# Patient Record
Sex: MALE | Race: WHITE | NOT HISPANIC OR LATINO | Employment: OTHER | URBAN - METROPOLITAN AREA
[De-identification: names, ages, dates, MRNs, and addresses within clinical notes are randomized per-mention and may not be internally consistent; named-entity substitution may affect disease eponyms.]

---

## 2017-02-07 ENCOUNTER — CLINICAL SUPPORT (OUTPATIENT)
Dept: FAMILY MEDICINE | Facility: CLINIC | Age: 72
End: 2017-02-07
Payer: MEDICARE

## 2017-02-07 DIAGNOSIS — I10 ESSENTIAL HYPERTENSION: ICD-10-CM

## 2017-02-07 DIAGNOSIS — E78.5 HYPERLIPIDEMIA, UNSPECIFIED HYPERLIPIDEMIA TYPE: ICD-10-CM

## 2017-02-07 LAB
ALBUMIN SERPL BCP-MCNC: 3.6 G/DL
ALP SERPL-CCNC: 48 U/L
ALT SERPL W/O P-5'-P-CCNC: 38 U/L
ANION GAP SERPL CALC-SCNC: 6 MMOL/L
AST SERPL-CCNC: 35 U/L
BILIRUB SERPL-MCNC: 0.9 MG/DL
BUN SERPL-MCNC: 18 MG/DL
CALCIUM SERPL-MCNC: 9.3 MG/DL
CHLORIDE SERPL-SCNC: 109 MMOL/L
CHOLEST/HDLC SERPL: 2.8 {RATIO}
CO2 SERPL-SCNC: 26 MMOL/L
CREAT SERPL-MCNC: 0.8 MG/DL
EST. GFR  (AFRICAN AMERICAN): >60 ML/MIN/1.73 M^2
EST. GFR  (NON AFRICAN AMERICAN): >60 ML/MIN/1.73 M^2
GLUCOSE SERPL-MCNC: 79 MG/DL
HDL/CHOLESTEROL RATIO: 35.6 %
HDLC SERPL-MCNC: 146 MG/DL
HDLC SERPL-MCNC: 52 MG/DL
LDLC SERPL CALC-MCNC: 81.8 MG/DL
NONHDLC SERPL-MCNC: 94 MG/DL
POTASSIUM SERPL-SCNC: 4.9 MMOL/L
PROT SERPL-MCNC: 6.4 G/DL
SODIUM SERPL-SCNC: 141 MMOL/L
TRIGL SERPL-MCNC: 61 MG/DL

## 2017-02-07 PROCEDURE — 80053 COMPREHEN METABOLIC PANEL: CPT

## 2017-02-07 PROCEDURE — 80061 LIPID PANEL: CPT

## 2017-02-13 ENCOUNTER — DOCUMENTATION ONLY (OUTPATIENT)
Dept: FAMILY MEDICINE | Facility: CLINIC | Age: 72
End: 2017-02-13

## 2017-02-13 NOTE — PROGRESS NOTES
Health Maintenance Due   Topic Date Due    Colonoscopy  03/30/1997    Zoster Vaccine  03/30/2007    Pneumococcal (65+) (1 of 2 - PCV13) 03/30/2012

## 2017-02-14 ENCOUNTER — OFFICE VISIT (OUTPATIENT)
Dept: FAMILY MEDICINE | Facility: CLINIC | Age: 72
End: 2017-02-14
Payer: MEDICARE

## 2017-02-14 VITALS
RESPIRATION RATE: 16 BRPM | HEIGHT: 68 IN | OXYGEN SATURATION: 96 % | DIASTOLIC BLOOD PRESSURE: 89 MMHG | WEIGHT: 184.06 LBS | BODY MASS INDEX: 27.9 KG/M2 | HEART RATE: 81 BPM | TEMPERATURE: 98 F | SYSTOLIC BLOOD PRESSURE: 137 MMHG

## 2017-02-14 DIAGNOSIS — I10 ESSENTIAL HYPERTENSION: Primary | ICD-10-CM

## 2017-02-14 DIAGNOSIS — E78.5 HYPERLIPIDEMIA, UNSPECIFIED HYPERLIPIDEMIA TYPE: ICD-10-CM

## 2017-02-14 DIAGNOSIS — Z23 NEED FOR VACCINATION WITH 13-POLYVALENT PNEUMOCOCCAL CONJUGATE VACCINE: ICD-10-CM

## 2017-02-14 PROCEDURE — G0009 ADMIN PNEUMOCOCCAL VACCINE: HCPCS | Mod: S$GLB,,, | Performed by: INTERNAL MEDICINE

## 2017-02-14 PROCEDURE — 99213 OFFICE O/P EST LOW 20 MIN: CPT | Mod: S$GLB,,, | Performed by: INTERNAL MEDICINE

## 2017-02-14 PROCEDURE — 90670 PCV13 VACCINE IM: CPT | Mod: S$GLB,,, | Performed by: INTERNAL MEDICINE

## 2017-02-14 NOTE — PATIENT INSTRUCTIONS

## 2017-02-14 NOTE — MR AVS SNAPSHOT
[facility]  [address] [address] [address]  Phone: [phone]  Fax: [phone]                  Michael Ann Jr.   2017 8:20 AM   Office Visit    Description:  Male : 1945   Provider:  Daniel Baptiste MD   Department:  [facility]           Reason for Visit     Hyperlipidemia           Diagnoses this Visit        Comments    Essential hypertension    -  Primary     Hyperlipidemia, unspecified hyperlipidemia type         Need for vaccination with 13-polyvalent pneumococcal conjugate vaccine                To Do List           Future Appointments        Provider Department Dept Phone    8/15/2017 8:40 AM Daniel Baptiste MD [facility] [phone]      Goals (5 Years of Data)     None      Follow-Up and Disposition     Return in about 6 months (around 2017).      [facility]     [facility] Nurse [facility] - 24/7 Assistance  Registered nurses in the [facility] On Call Center provide clinical advisement, health education, appointment booking, and other advisory services.  Call for this free service at [phone].             Medications           Message regarding Medications     Verify the changes and/or additions to your medication regime listed below are the same as discussed with your clinician today.  If any of these changes or additions are incorrect, please notify your healthcare provider.             Verify that the below list of medications is an accurate representation of the medications you are currently taking.  If none reported, the list may be blank. If incorrect, please contact your healthcare provider. Carry this list with you in case of emergency.           Current Medications     amlodipine (NORVASC) 2.5 MG tablet Take 1 tablet (2.5 mg total) by mouth once daily.    pravastatin (PRAVACHOL) 40 MG tablet Take 1 tablet (40 mg total) by mouth once daily. Start after a 16    LORATADINE  "(CLARITIN ORAL) Take by mouth once daily.           Clinical Reference Information           Your Vitals Were     BP Pulse Temp Resp Height Weight    137/89 (BP Location: Left arm, Patient Position: Sitting, BP Method: Automatic) 81 97.7 °F (36.5 °C) (Oral) 16 5' 8" (1.727 m) 83.5 kg (184 lb 1.4 oz)    SpO2 BMI             96% 27.99 kg/m2         Blood Pressure          Most Recent Value    BP  137/89      Allergies as of 2/14/2017     No Known Allergies      Immunizations Administered on Date of Encounter - 2/14/2017     Name Date Dose VIS Date Route    Pneumococcal Conjugate - 13 Valent 2/14/2017 0.5 mL 11/5/2015 Intramuscular      Orders Placed During Today's Visit      Normal Orders This Visit    Pneumococcal Conjugate Vaccine (13 Valent) (IM)     Future Labs/Procedures Expected by Expires    Comprehensive metabolic panel  2/14/2017 2/15/2018    Lipid panel  2/14/2017 2/15/2018      [facility] Sign-Up     Activating your [facility] account is as easy as 1-2-3!     1) Visit [email or web address], select Sign Up Now, enter this activation code and your date of birth, then select Next.  Activation code not generated  Current Patient Portal Status: Account disabled      2) Create a username and password to use when you visit [facility] in the future and select a security question in case you lose your password and select Next.    3) Enter your e-mail address and click Sign Up!    Additional Information  If you have questions, please e-mail [email or web address] or call [phone] to talk to our [facility] staff. Remember, [facility] is NOT to be used for urgent needs. For medical emergencies, dial 911.         Instructions    Low-Salt Diet  This diet removes foods that are high in salt. It also limits the amount of salt you use when cooking. It is most often used for people with high blood pressure, edema (fluid retention), and kidney, liver, or heart disease.  Table salt contains the mineral sodium. Your body needs sodium to " work normally. But too much sodium can make your health problems worse. Your healthcare provider is recommending a low-salt (also called low-sodium) diet for you. Your total daily allowance of salt is 1,500 to 2,300 milligrams (mg). It is less than 1 teaspoon of table salt. This means you can have only about 500 to 700 mg of sodium at each meal. People with certain health problems should limit salt intake to the lower end of the recommended range.    When you cook, dont add much salt. If you can cook without using salt, even better. Dont add salt to your food at the table.  When shopping, read food labels. Salt is often called sodium on the label. Choose foods that are salt-free, low salt, or very low salt. Note that foods with reduced salt may not lower your salt intake enough.    Beans, potatoes, and pasta  Ok: Dry beans, split peas, lentils, potatoes, rice, macaroni, pasta, spaghetti without added salt  Avoid: Potato chips, tortilla chips, and similar products  Breads and cereals  Ok: Low-sodium breads, rolls, cereals, and cakes; low-salt crackers, matzo crackers  Avoid: Salted crackers, pretzels, popcorn, Panamanian toast, pancakes, muffins  Dairy  Ok: Milk, chocolate milk, hot chocolate mix, low-salt cheeses, and yogurt  Avoid: Processed cheese and cheese spreads; Roquefort, Camembert, and cottage cheese; buttermilk, instant breakfast drink  Desserts  Ok: Ice cream, frozen yogurt, juice bars, gelatin, cookies and pies, sugar, honey, jelly, hard candy  Avoid: Most pies, cakes and cookies prepared or processed with salt; instant pudding  Drinks  Ok: Tea, coffee, fizzy (carbonated) drinks, juices  Avoid: Flavored coffees, electrolyte replacement drinks, sports drinks  Meats  Ok: All fresh meat, fish, poultry, low-salt tuna, eggs, egg substitute  Avoid: Smoked, pickled, brine-cured, or salted meats and fish. This includes barraza, chipped beef, corned beef, hot dogs, deli meats, ham, kosher meats, salt pork, sausage,  canned tuna, salted codfish, smoked salmon, herring, sardines, or anchovies.  Seasonings and spices  Ok: Most seasonings are okay. Good substitutes for salt include: fresh herb blends, hot sauce, lemon, garlic, barton, vinegar, dry mustard, parsley, cilantro, horseradish, tomato paste, regular margarine, mayonnaise, unsalted butter, cream cheese, vegetable oil, cream, low-salt salad dressing and gravy.  Avoid: Regular ketchup, relishes, pickles, soy sauce, teriyaki sauce, Worcestershire sauce, BBQ sauce, tartar sauce, meat tenderizer, chili sauce, regular gravy, regular salad dressing, salted butter  Soups  Ok: Low-salt soups and broths made with allowed foods  Avoid: Bouillon cubes, soups with smoked or salted meats, regular soup and broth  Vegetables  Ok: Most vegetables are okay; also low-salt tomato and vegetable juices  Avoid: Sauerkraut and other brine-soaked vegetables; pickles and other pickled vegetables; tomato juice, olives  © 1639-3694 [employer]. [address] [place] [address], [place] [address]. All rights reserved. This information is not intended as a substitute for professional medical care. Always follow your healthcare professional's instructions.       Language Assistance Services     ATTENTION: Language assistance services are available, free of charge. Please call [phone].      ATENCIÓN: Si habla español, tiene a rush disposición servicios gratuitos de asistencia lingüística. Llame al [phone].     CHÚ Ý: N?u b?n nói Ti?ng Vi?t, có các d?ch v? h? tr? ngôn ng? mi?n phí dành cho b?n. G?i s? [phone].         [facility] complies with applicable Federal civil rights laws and does not discriminate on the basis of race, color, national origin, age, disability, or sex.

## 2017-02-14 NOTE — PROGRESS NOTES
Subjective:       Patient ID: Michael Ann Jr. is a 71 y.o. male.    Chief Complaint: Hyperlipidemia (labs)    HPI       CHIEF COMPLAINT: Hyperlipidemia. cholesterol screening: no.   HPI:     ONSET:    MODIFIERS/TREATMENTS: . Taking medications: yes. . Non-compliance with following diet: no. .     SYMPTOMS/RELATED:Possible medication side effects include:   Myalgia: no.  .     REVIEW OF SYMPTOMS: past weights:   Wt Readings from Last 1 Encounters:   02/14/17 0821 83.5 kg (184 lb 1.4 oz)                                                     Last lipids: total   Lab Results   Component Value Date    CHOL 146 02/07/2017    CHOL 158 11/08/2016                                                                     HDL   Lab Results   Component Value Date    HDL 52 02/07/2017    HDL 49 11/08/2016                                                                     LDL   Lab Results   Component Value Date    LDLCALC 81.8 02/07/2017    LDLCALC 97.4 11/08/2016                                                                     TRIG   Lab Results   Component Value Date    TRIG 61 02/07/2017    TRIG 58 11/08/2016                                                                           CHIEF COMPLAINT: Hypertension  HPI:     ONSET:      QUALITY/COURSE:   Controlled:  yes     INTENSITY/SEVERITY:  Average blood pressure is  130/70.     MODIFIERS/TREATMENTS:  Taking medications: yes. .High sodium intake: no. alcohol: no      The following symptoms are positive only if BOLDED, otherwise are negative.      SYMPTOMS/RELATED: Possible medication side effects include:   Depression..  . Cough. . Constipation.    REVIEW OF SYMPTOMS: . Weight_loss . Weight_gain . Leg_cramps .Potency_problems .    TARGET ORGAN DAMAGE:: angina/ prior myocardial infarction, chronic kidney disease, heart failure, left ventricular hypertrophy, peripheral artery disease, prior coronary revascularization, retinopathy, stroke. transient ischemic  "attack.          Review of Systems   Eyes: Negative for visual disturbance.   Respiratory: Negative for chest tightness and shortness of breath.    Cardiovascular: Positive for leg swelling (after you been on his feet for long time). Negative for chest pain.   Neurological: Negative for dizziness, syncope, weakness and headaches.   Psychiatric/Behavioral: Negative for dysphoric mood. The patient is not nervous/anxious.        Objective:      Vitals:    02/14/17 0821   BP: 137/89   Pulse: 81   Resp: 16   Temp: 97.7 °F (36.5 °C)   TempSrc: Oral   SpO2: 96%   Weight: 83.5 kg (184 lb 1.4 oz)   Height: 5' 8" (1.727 m)   PainSc: 0-No pain     Physical Exam   Constitutional: He appears well-developed and well-nourished.   Eyes: Pupils are equal, round, and reactive to light.   Cardiovascular: Normal rate, regular rhythm and normal heart sounds.    Pulmonary/Chest: Effort normal and breath sounds normal.   Abdominal: Soft. There is no tenderness.   Neurological: He is alert.   Psychiatric: He has a normal mood and affect. His behavior is normal. Thought content normal.   Nursing note and vitals reviewed.             Assessment:       1. Essential hypertension    2. Hyperlipidemia, unspecified hyperlipidemia type    3. Need for vaccination with 13-polyvalent pneumococcal conjugate vaccine          Plan:     Essential hypertension  -     Comprehensive metabolic panel; Future; Expected date: 2/14/17    Hyperlipidemia, unspecified hyperlipidemia type  -     Lipid panel; Future; Expected date: 2/14/17    Need for vaccination with 13-polyvalent pneumococcal conjugate vaccine  -     Pneumococcal Conjugate Vaccine (13 Valent) (IM)      Return in about 6 months (around 8/14/2017).      "

## 2017-02-28 ENCOUNTER — NURSE TRIAGE (OUTPATIENT)
Dept: ADMINISTRATIVE | Facility: CLINIC | Age: 72
End: 2017-02-28

## 2017-02-28 ENCOUNTER — HOSPITAL ENCOUNTER (EMERGENCY)
Facility: HOSPITAL | Age: 72
Discharge: HOME | End: 2017-02-28
Attending: EMERGENCY MEDICINE
Payer: MEDICARE

## 2017-02-28 VITALS
WEIGHT: 184 LBS | HEART RATE: 96 BPM | TEMPERATURE: 98 F | BODY MASS INDEX: 27.98 KG/M2 | SYSTOLIC BLOOD PRESSURE: 166 MMHG | RESPIRATION RATE: 16 BRPM | DIASTOLIC BLOOD PRESSURE: 93 MMHG | OXYGEN SATURATION: 98 %

## 2017-02-28 DIAGNOSIS — L02.411 ABSCESS OF RIGHT AXILLA: Primary | ICD-10-CM

## 2017-02-28 PROCEDURE — 10060 I&D ABSCESS SIMPLE/SINGLE: CPT

## 2017-02-28 PROCEDURE — 99283 EMERGENCY DEPT VISIT LOW MDM: CPT | Mod: 25

## 2017-02-28 RX ORDER — DOXYCYCLINE 100 MG/1
100 CAPSULE ORAL 2 TIMES DAILY
Qty: 20 CAPSULE | Refills: 0 | Status: SHIPPED | OUTPATIENT
Start: 2017-02-28 | End: 2017-03-10

## 2017-02-28 RX ORDER — LIDOCAINE HYDROCHLORIDE 10 MG/ML
10 INJECTION, SOLUTION EPIDURAL; INFILTRATION; INTRACAUDAL; PERINEURAL
Status: DISCONTINUED | OUTPATIENT
Start: 2017-02-28 | End: 2017-02-28 | Stop reason: HOSPADM

## 2017-02-28 NOTE — TELEPHONE ENCOUNTER
Reason for Disposition   Patient sounds very sick or weak to the triager    Protocols used:  SPIDER BITE - [facility]    Patient was bit under his arm by a spider over 1 week ago and report that the area is very tender and swollen. Patient advised to go to the ED for attention.

## 2017-02-28 NOTE — DISCHARGE INSTRUCTIONS
Keep wound clean and dry.  Take antibiotics as prescribed.  See your primary care provider within one week.  For worsening symptoms, chest pain, shortness of breath, increased abdominal pain, high grade fever, stroke or stroke like symptoms, immediately go to the nearest Emergency Room or call 911 as soon as possible.

## 2017-02-28 NOTE — ED AVS SNAPSHOT
[facility] [address] [place] [address]               Michael Ann JrShree   2017  9:40 AM   ED    Description:  Male : 1945   Department:  [facility]           Your Care was Coordinated By:     Provider Role From To    Wolfgang Correia MD Attending Provider 17 0941 --    Aziza Waite PA-C Physician Assistant 17 0941 --      Reason for Visit     Abscess           Diagnoses this Visit        Comments    Abscess of right axilla    -  Primary       ED Disposition     ED Disposition Condition Comment    Discharge             To Do List           Follow-up Information     Follow up with Daniel Baptiste MD In 1 week.    Specialties:  Family Medicine, Internal Medicine    Contact information:    [facility]  [place] [address]  [phone]          Follow up with [facility].    Specialty:  Emergency Medicine    Why:  If symptoms worsen    Contact information:    [address] [place] [address] [phone]       These Medications        Disp Refills Start End    doxycycline (VIBRAMYCIN) 100 MG Cap 20 capsule 0 2017 3/10/2017    Take 1 capsule (100 mg total) by mouth 2 (two) times daily. - Oral    Pharmacy: [facility] [place] - [address] Ph #: [phone]         [facility]     [facility] Care [facility] - / Assistance  Registered nurses in the [facility] provide clinical advisement, health education, appointment booking, and other advisory services.  Call for this free service at [phone].             Medications           Message regarding Medications     Verify the changes and/or additions to your medication regime listed below are the same as discussed with your clinician today.  If any of these changes or additions are incorrect, please notify your healthcare provider.        START taking  these NEW medications        Refills    doxycycline (VIBRAMYCIN) 100 MG Cap 0    Sig: Take 1 capsule (100 mg total) by mouth 2 (two) times daily.    Class: Print    Route: Oral      These medications were administered today        Dose Freq    lidocaine (PF) 10 mg/ml (1%) injection 100 mg 10 mL ED 1 Time    Sig: 10 mLs (100 mg total) by Infiltration route ED 1 Time.    Class: Normal    Route: Infiltration           Verify that the below list of medications is an accurate representation of the medications you are currently taking.  If none reported, the list may be blank. If incorrect, please contact your healthcare provider. Carry this list with you in case of emergency.           Current Medications     amlodipine (NORVASC) 2.5 MG tablet Take 1 tablet (2.5 mg total) by mouth once daily.    pravastatin (PRAVACHOL) 40 MG tablet Take 1 tablet (40 mg total) by mouth once daily. Start after a 8/23/16    doxycycline (VIBRAMYCIN) 100 MG Cap Take 1 capsule (100 mg total) by mouth 2 (two) times daily.    lidocaine (PF) 10 mg/ml (1%) injection 100 mg 10 mLs (100 mg total) by Infiltration route ED 1 Time.    LORATADINE (CLARITIN ORAL) Take by mouth once daily.           Clinical Reference Information           Your Vitals Were     BP                   166/93 (BP Location: Right arm, Patient Position: Sitting)           Allergies as of 2/28/2017     No Known Allergies      Immunizations Administered on Date of Encounter - 2/28/2017     None      ED Micro, Lab, POCT     None      ED Imaging Orders     None        Discharge Instructions       Keep wound clean and dry.  Take antibiotics as prescribed.  See your primary care provider within one week.  For worsening symptoms, chest pain, shortness of breath, increased abdominal pain, high grade fever, stroke or stroke like symptoms, immediately go to the nearest Emergency Room or call 911 as soon as possible.       Discharge References/Attachments     ABSCESS, INCISION AND DRAINAGE  (ENGLISH)      Your Scheduled Appointments     Aug 15, 2017  8:40 AM CDT   Established Patient Visit with Daniel Baptiste MD   [place] [id number] [place] [id number] [phone]              [facility] Sign-Up     Activating your [facility] account is as easy as 1-2-3!     1) Visit [email or web address], select Sign Up Now, enter this activation code and your date of birth, then select Next.  Activation code not generated  Current Patient Portal Status: Account disabled      2) Create a username and password to use when you visit [facility] in the future and select a security question in case you lose your password and select Next.    3) Enter your e-mail address and click Sign Up!    Additional Information  If you have questions, please e-mail [email or web address] or call [phone] to talk to our [facility] staff. Remember, [employer] [facility] is NOT to be used for urgent needs. For medical emergencies, dial 911.          [facility] complies with applicable Federal civil rights laws and does not discriminate on the basis of race, color, national origin, age, disability, or sex.        Language Assistance Services     ATTENTION: Language assistance services are available, free of charge. Please call [phone].      ATENCIÓN: Si habla español, tiene a rush disposición servicios gratuitos de asistencia lingüística. Llame al [phone].     CHÚ Ý: N?u b?n nói Ti?ng Vi?t, có các d?ch v? h? tr? ngôn ng? mi?n phí dành cho b?n. G?i s? [phone].

## 2017-02-28 NOTE — ED PROVIDER NOTES
"Encounter Date: 2/28/2017       History     Chief Complaint   Patient presents with    Abscess     RIGHT axilla     Review of patient's allergies indicates:  No Known Allergies  HPI Comments: Patient is a 71 year old male with complaint of abscess to right axilla. He has PMH significant for hypertension. He reports "I think a spider bit me". He has erythema and tenderness to right axilla. He reports it is progressively getting larger. He used steroid cream and benadryl cream without significant improvement. He reports constant, moderate pain. He denied fever, chills, nausea, vomiting or recent illness.     The history is provided by the patient and the spouse.     Past Medical History:   Diagnosis Date    Hypertension      Past Surgical History:   Procedure Laterality Date    HAND SURGERY      HERNIA REPAIR      NECK SURGERY      PROSTATECTOMY       History reviewed. No pertinent family history.  Social History   Substance Use Topics    Smoking status: Never Smoker    Smokeless tobacco: None    Alcohol use No     Review of Systems   Constitutional: Negative for chills and fever.   HENT: Negative for sore throat.    Respiratory: Negative for shortness of breath.    Cardiovascular: Negative for chest pain.   Gastrointestinal: Negative for abdominal pain, diarrhea, nausea and vomiting.   Genitourinary: Negative for dysuria.   Musculoskeletal: Negative for back pain.   Skin: Positive for color change and wound. Negative for rash.   Neurological: Negative for dizziness, weakness and light-headedness.   Hematological: Does not bruise/bleed easily.       Physical Exam   Initial Vitals   BP Pulse Resp Temp SpO2   02/28/17 0924 02/28/17 0924 02/28/17 0924 02/28/17 0924 02/28/17 0924   166/93 96 16 97.6 °F (36.4 °C) 98 %     Physical Exam    Nursing note and vitals reviewed.  Constitutional: He appears well-developed and well-nourished.   HENT:   Head: Normocephalic and atraumatic.   Right Ear: External ear normal. "   Left Ear: External ear normal.   Nose: Nose normal.   Eyes: Conjunctivae are normal. Right eye exhibits no discharge. Left eye exhibits no discharge. No scleral icterus.   Neck: Normal range of motion. Neck supple.   Cardiovascular: Normal rate, regular rhythm and normal heart sounds. Exam reveals no gallop and no friction rub.    No murmur heard.  Pulmonary/Chest: Breath sounds normal. He has no wheezes. He has no rhonchi. He has no rales.   Abdominal: Soft. Bowel sounds are normal. He exhibits no distension. There is no tenderness.   Musculoskeletal: Normal range of motion. He exhibits no tenderness.   Neurological: He is oriented to person, place, and time.   Skin: Skin is warm and dry. Abscess noted. There is erythema.   1 cm area of induration, fluctuance and tenderness with surrounding erythema noted to right axilla.          ED Course   I & D - Incision and Drainage  Date/Time: 2/28/2017 3:15 PM  Performed by: OLINDA CHOWDARY  Authorized by: PATITO DUVALL   Type: abscess  Location: Right axilla.  Anesthesia: local infiltration    Anesthesia:  Anesthesia: local infiltration  Local Anesthetic: lidocaine 1% without epinephrine   Anesthetic total: 1 mL  Scalpel size: 11  Incision type: single straight  Complexity: simple  Drainage: pus  Drainage amount: moderate  Wound treatment: incision,  drainage and  expression of material  Packing material: 1/4 in gauze  Patient tolerance: Patient tolerated the procedure well with no immediate complications        Labs Reviewed - No data to display          Medical Decision Making:   History:   I obtained history from: someone other than patient.  Old Medical Records: I decided to obtain old medical records.       APC / Resident Notes:   This is an emergent evaluation of a 71 year old male with complaint of abscess to right axilla. Patient is noted to have erythema, induration and warmth to right axilla. He suspect a spider bite but denied seeing anything  bite him. Patient denied fever. I&D performed, see procedure note. Patient tolerated well. Patient was given instructions on wound care. Antibiotics given. Follow up with primary care provider. Return precautions given. All questions answered. Case was discussed with Dr. Correia who has evaluated the patient and is in agreement with the plan of care.                 ED Course     Clinical Impression:   The encounter diagnosis was Abscess of right axilla.    Disposition:   Disposition: Discharged  Condition: Stable       Aziza Waite PA-C  02/28/17 1511

## 2017-08-03 ENCOUNTER — TELEPHONE (OUTPATIENT)
Dept: FAMILY MEDICINE | Facility: CLINIC | Age: 72
End: 2017-08-03

## 2017-08-03 NOTE — TELEPHONE ENCOUNTER
----- Message from Wesley Burroughs sent at 8/3/2017  8:45 AM CDT -----  Contact: pt's wife Izabel Sol needs to schedule an appt to have his blood work done  Call Back#[phone]  Thanks

## 2017-08-08 ENCOUNTER — APPOINTMENT (OUTPATIENT)
Dept: LAB | Facility: HOSPITAL | Age: 72
End: 2017-08-08
Attending: INTERNAL MEDICINE
Payer: MEDICARE

## 2017-08-08 ENCOUNTER — CLINICAL SUPPORT (OUTPATIENT)
Dept: FAMILY MEDICINE | Facility: CLINIC | Age: 72
End: 2017-08-08
Payer: MEDICARE

## 2017-08-08 DIAGNOSIS — Z11.59 NEED FOR HEPATITIS C SCREENING TEST: ICD-10-CM

## 2017-08-08 DIAGNOSIS — I10 ESSENTIAL HYPERTENSION: ICD-10-CM

## 2017-08-08 DIAGNOSIS — E78.5 HYPERLIPIDEMIA, UNSPECIFIED HYPERLIPIDEMIA TYPE: ICD-10-CM

## 2017-08-08 LAB
ALBUMIN SERPL BCP-MCNC: 3.5 G/DL
ALP SERPL-CCNC: 47 U/L
ALT SERPL W/O P-5'-P-CCNC: 45 U/L
ANION GAP SERPL CALC-SCNC: 8 MMOL/L
AST SERPL-CCNC: 42 U/L
BILIRUB SERPL-MCNC: 1 MG/DL
BUN SERPL-MCNC: 19 MG/DL
CALCIUM SERPL-MCNC: 9.2 MG/DL
CHLORIDE SERPL-SCNC: 108 MMOL/L
CHOLEST/HDLC SERPL: 3.5 {RATIO}
CO2 SERPL-SCNC: 26 MMOL/L
CREAT SERPL-MCNC: 0.8 MG/DL
EST. GFR  (AFRICAN AMERICAN): >60 ML/MIN/1.73 M^2
EST. GFR  (NON AFRICAN AMERICAN): >60 ML/MIN/1.73 M^2
GLUCOSE SERPL-MCNC: 82 MG/DL
HDL/CHOLESTEROL RATIO: 28.3 %
HDLC SERPL-MCNC: 180 MG/DL
HDLC SERPL-MCNC: 51 MG/DL
LDLC SERPL CALC-MCNC: 116.4 MG/DL
NONHDLC SERPL-MCNC: 129 MG/DL
POTASSIUM SERPL-SCNC: 4.1 MMOL/L
PROT SERPL-MCNC: 6.5 G/DL
SODIUM SERPL-SCNC: 142 MMOL/L
TRIGL SERPL-MCNC: 63 MG/DL

## 2017-08-08 PROCEDURE — 80053 COMPREHEN METABOLIC PANEL: CPT

## 2017-08-08 PROCEDURE — 86803 HEPATITIS C AB TEST: CPT

## 2017-08-08 PROCEDURE — 80061 LIPID PANEL: CPT

## 2017-08-09 LAB — HCV AB SERPL QL IA: NEGATIVE

## 2017-08-10 ENCOUNTER — TELEPHONE (OUTPATIENT)
Dept: FAMILY MEDICINE | Facility: CLINIC | Age: 72
End: 2017-08-10

## 2017-08-11 NOTE — TELEPHONE ENCOUNTER
----- Message from Marilee Barry sent at 8/11/2017  8:24 AM CDT -----  Contact: Wife  Izabel [phone] Wife is returning the nurse's call. Please advise. Thanks

## 2017-08-14 ENCOUNTER — DOCUMENTATION ONLY (OUTPATIENT)
Dept: FAMILY MEDICINE | Facility: CLINIC | Age: 72
End: 2017-08-14

## 2017-08-14 NOTE — PROGRESS NOTES
Health Maintenance Due   Topic Date Due    Colonoscopy  03/30/1997    Zoster Vaccine  03/30/2007    Influenza Vaccine  08/01/2017

## 2017-08-15 ENCOUNTER — HOSPITAL ENCOUNTER (OUTPATIENT)
Dept: RADIOLOGY | Facility: CLINIC | Age: 72
Discharge: HOME | End: 2017-08-15
Attending: INTERNAL MEDICINE
Payer: MEDICARE

## 2017-08-15 ENCOUNTER — APPOINTMENT (OUTPATIENT)
Dept: LAB | Facility: HOSPITAL | Age: 72
End: 2017-08-15
Attending: INTERNAL MEDICINE
Payer: MEDICARE

## 2017-08-15 ENCOUNTER — OFFICE VISIT (OUTPATIENT)
Dept: FAMILY MEDICINE | Facility: CLINIC | Age: 72
End: 2017-08-15
Payer: MEDICARE

## 2017-08-15 VITALS
SYSTOLIC BLOOD PRESSURE: 138 MMHG | DIASTOLIC BLOOD PRESSURE: 86 MMHG | WEIGHT: 187.19 LBS | BODY MASS INDEX: 28.37 KG/M2 | TEMPERATURE: 98 F | HEART RATE: 83 BPM | HEIGHT: 68 IN | RESPIRATION RATE: 16 BRPM | OXYGEN SATURATION: 96 %

## 2017-08-15 DIAGNOSIS — R06.09 DYSPNEA ON EXERTION: ICD-10-CM

## 2017-08-15 DIAGNOSIS — Z12.11 COLON CANCER SCREENING: ICD-10-CM

## 2017-08-15 DIAGNOSIS — R06.09 DYSPNEA ON EXERTION: Primary | ICD-10-CM

## 2017-08-15 DIAGNOSIS — M79.672 BILATERAL FOOT PAIN: ICD-10-CM

## 2017-08-15 DIAGNOSIS — G57.01 PIRIFORMIS SYNDROME, RIGHT: ICD-10-CM

## 2017-08-15 DIAGNOSIS — M79.671 BILATERAL FOOT PAIN: ICD-10-CM

## 2017-08-15 LAB
ALBUMIN SERPL BCP-MCNC: 3.7 G/DL
ALP SERPL-CCNC: 49 U/L
ALT SERPL W/O P-5'-P-CCNC: 50 U/L
ANION GAP SERPL CALC-SCNC: 9 MMOL/L
AST SERPL-CCNC: 48 U/L
BASOPHILS # BLD AUTO: 0 K/UL
BASOPHILS NFR BLD: 0.6 %
BILIRUB SERPL-MCNC: 0.9 MG/DL
BNP SERPL-MCNC: 23 PG/ML
BUN SERPL-MCNC: 15 MG/DL
CALCIUM SERPL-MCNC: 9.5 MG/DL
CHLORIDE SERPL-SCNC: 108 MMOL/L
CO2 SERPL-SCNC: 24 MMOL/L
CREAT SERPL-MCNC: 0.8 MG/DL
DIFFERENTIAL METHOD: ABNORMAL
EOSINOPHIL # BLD AUTO: 0.1 K/UL
EOSINOPHIL NFR BLD: 1.5 %
ERYTHROCYTE [DISTWIDTH] IN BLOOD BY AUTOMATED COUNT: 14.5 %
EST. GFR  (AFRICAN AMERICAN): >60 ML/MIN/1.73 M^2
EST. GFR  (NON AFRICAN AMERICAN): >60 ML/MIN/1.73 M^2
GLUCOSE SERPL-MCNC: 92 MG/DL
HCT VFR BLD AUTO: 46.5 %
HGB BLD-MCNC: 15.8 G/DL
LYMPHOCYTES # BLD AUTO: 1.7 K/UL
LYMPHOCYTES NFR BLD: 21.3 %
MCH RBC QN AUTO: 31.5 PG
MCHC RBC AUTO-ENTMCNC: 33.9 G/DL
MCV RBC AUTO: 93 FL
MONOCYTES # BLD AUTO: 0.9 K/UL
MONOCYTES NFR BLD: 10.7 %
NEUTROPHILS # BLD AUTO: 5.3 K/UL
NEUTROPHILS NFR BLD: 65.9 %
PLATELET # BLD AUTO: 221 K/UL
PMV BLD AUTO: 8.8 FL
POTASSIUM SERPL-SCNC: 4.1 MMOL/L
PROT SERPL-MCNC: 6.8 G/DL
RBC # BLD AUTO: 5.01 M/UL
SODIUM SERPL-SCNC: 141 MMOL/L
WBC # BLD AUTO: 8 K/UL

## 2017-08-15 PROCEDURE — 85025 COMPLETE CBC W/AUTO DIFF WBC: CPT

## 2017-08-15 PROCEDURE — 83880 ASSAY OF NATRIURETIC PEPTIDE: CPT

## 2017-08-15 PROCEDURE — 93010 ELECTROCARDIOGRAM REPORT: CPT | Mod: ,,, | Performed by: INTERNAL MEDICINE

## 2017-08-15 PROCEDURE — 80053 COMPREHEN METABOLIC PANEL: CPT

## 2017-08-15 PROCEDURE — 3075F SYST BP GE 130 - 139MM HG: CPT | Mod: S$GLB,,, | Performed by: INTERNAL MEDICINE

## 2017-08-15 PROCEDURE — 1159F MED LIST DOCD IN RCRD: CPT | Mod: S$GLB,,, | Performed by: INTERNAL MEDICINE

## 2017-08-15 PROCEDURE — 93005 ELECTROCARDIOGRAM TRACING: CPT | Mod: S$GLB,,, | Performed by: INTERNAL MEDICINE

## 2017-08-15 PROCEDURE — 1125F AMNT PAIN NOTED PAIN PRSNT: CPT | Mod: S$GLB,,, | Performed by: INTERNAL MEDICINE

## 2017-08-15 PROCEDURE — 99214 OFFICE O/P EST MOD 30 MIN: CPT | Mod: S$GLB,,, | Performed by: INTERNAL MEDICINE

## 2017-08-15 PROCEDURE — 3079F DIAST BP 80-89 MM HG: CPT | Mod: S$GLB,,, | Performed by: INTERNAL MEDICINE

## 2017-08-15 PROCEDURE — 3008F BODY MASS INDEX DOCD: CPT | Mod: S$GLB,,, | Performed by: INTERNAL MEDICINE

## 2017-08-15 PROCEDURE — 71020 XR CHEST PA AND LATERAL: CPT | Mod: 26,,, | Performed by: RADIOLOGY

## 2017-08-15 PROCEDURE — 71020 XR CHEST PA AND LATERAL: CPT | Mod: TC,PO

## 2017-08-15 RX ORDER — OMEPRAZOLE 20 MG/1
20 CAPSULE, DELAYED RELEASE ORAL DAILY
Qty: 30 CAPSULE | Refills: 11 | Status: SHIPPED | OUTPATIENT
Start: 2017-08-15 | End: 2018-08-21

## 2017-08-15 NOTE — PROGRESS NOTES
Subjective:       Patient ID: Michael Ann Jr. is a 72 y.o. male.    Chief Complaint: Hypertension (labs); Shortness of Breath (discuss bp and pulse); Back Pain (down into leg); and Foot Pain    HPI       CHIEF COMPLAINT: Dyspnea    .   HPI:     ONSET/TIMIN y        ago.  It occurs with: .    DURATION: . intermittant    QUALITY/COURSE:   unchanged      INTENSITY/SEVERITY:  5 (0 to 10)               MODIFIERS/TREATMENTS:Precipitating factors: exercise, 100 yards walking  Weights:   Wt Readings from Last 1 Encounters:   08/15/17 0849 84.9 kg (187 lb 2.7 oz)     BNP    @LABRCNTIP(BNP,BNPTRIAGEBLO)@  D-dimer  No results found for: DDIMER      SYMPTOMS/RELATED: . --Possible medication side effects include:    The following symptoms/statements are positive if in BOLD, negative if not.          CONTEXT/WHEN:  Similar_problems . Tobacco_use. Seasonal_pattern. Allergies/Hayfever.. Sinusitis. . Asthma.  Exposure_to_others_with_similar_symptoms.      TREATMENTS:  OTC_Cough/Decongestants..  Rx_Cough/Decongestants. Bronchodilators.. Inhaled_Corticosteroids. Oral_Corticosteroids... .Antibiotics.    REVIEW OF SYMPTOMS:    . Dyspnea on exertion. Paroxysmal_Nocturnal_Dyspnea.. Orthopnea...  Purulent_Sputum. . Hemoptysis.  Rhinorrhea/Purulent.   Stressed. Weight_loss. Weight_gain. Leg_Pain.           Lower_Extremity_Problems(+). Pain: yes. . Injury: no.   HPI: Hurts when he puts his foot down.    ONSET/TIMING: . Onset   since his vacation 2 weeks    ago.     DURATION:   Intermittent         QUALITY/COURSE:    unchanged ,. .     LOCATION:    Right buttocks    . Radiation: no.      INTENSITY/SEVERITY:. Severity is #   5   (10 point scale).        MODIFIERS/TREATMENTS: Current_Limitations_are:  none..   Taking medications: no    Physical_Therapy: no.  Chiropractor: no.     SYMPTOMS/RELATED: . --Possible medication side effects include:.     The following symptoms/statements  are positive if BOLD, negative  "otherwise.      CONTEXT/WHEN: . Activity . weight bearing. Inactivity.  Sudden  Work related.  Similar_problems_in past.   . Litigation_pending . X-rays. CT . MRI      REVIEW OF SYMPTOMS:   Numbness. Weakness. Muscle_Problems. Fever. Joint_Problems. Swelling. Erythema. Weight_loss. Instability.      .         At night gets lateral eversion of his foot with cramping and pain in his ankles.          Review of Systems   Constitutional: Negative for activity change, chills, diaphoresis, fatigue, fever and unexpected weight change.   HENT: Negative for congestion, rhinorrhea, sinus pressure, sore throat and trouble swallowing.    Respiratory: Positive for cough and shortness of breath. Negative for chest tightness.    Cardiovascular: Negative for chest pain, palpitations and leg swelling.   Gastrointestinal: Negative for abdominal pain.   Musculoskeletal: Positive for back pain.   Neurological: Negative for numbness.   Psychiatric/Behavioral: The patient is not nervous/anxious.        Objective:      Vitals:    08/15/17 0849 08/15/17 0938   BP: (!) 142/89 138/86   Pulse: 83    Resp: 16    Temp: 98.2 °F (36.8 °C)    TempSrc: Oral    SpO2: 96%    Weight: 84.9 kg (187 lb 2.7 oz)    Height: 5' 8" (1.727 m)    PainSc:   6    PainLoc: Back      Physical Exam   Constitutional: He appears well-developed and well-nourished.   Eyes: Pupils are equal, round, and reactive to light.   Cardiovascular: Normal rate, regular rhythm and normal heart sounds.    Pulmonary/Chest: Effort normal and breath sounds normal.   Abdominal: Soft. There is no tenderness.   Musculoskeletal: He exhibits edema (1+ bilaterally).   Tender over right ischial tuberosity   Neurological: He is alert.   Psychiatric: He has a normal mood and affect. His behavior is normal. Thought content normal.   Nursing note and vitals reviewed.        Assessment:       1. Dyspnea on exertion    2. Piriformis syndrome, right    3. Colon cancer screening    4. Bilateral foot " pain          Plan:     Dyspnea on exertion  -     CBC auto differential; Future; Expected date: 08/15/2017  -     Comprehensive metabolic panel; Future; Expected date: 08/15/2017  -     Brain natriuretic peptide; Future; Expected date: 08/15/2017  -     X-Ray Chest PA And Lateral; Future; Expected date: 08/15/2017  -     EKG 12-lead  -     Ambulatory referral to Cardiology    Piriformis syndrome, right  -     Ambulatory referral to Physical Medicine Rehab  -     omeprazole (PRILOSEC) 20 MG capsule; Take 1 capsule (20 mg total) by mouth once daily.  Dispense: 30 capsule; Refill: 11    Colon cancer screening  -     Ambulatory referral to Gastroenterology    Bilateral foot pain  -     Ambulatory consult to Podiatry      Return in about 6 weeks (around 9/26/2017).

## 2017-08-16 ENCOUNTER — TELEPHONE (OUTPATIENT)
Dept: FAMILY MEDICINE | Facility: CLINIC | Age: 72
End: 2017-08-16

## 2017-08-17 ENCOUNTER — TELEPHONE (OUTPATIENT)
Dept: FAMILY MEDICINE | Facility: CLINIC | Age: 72
End: 2017-08-17

## 2017-08-17 NOTE — TELEPHONE ENCOUNTER
----- Message from Diane Mcdonald sent at 8/17/2017  8:12 AM CDT -----  Contact: Wife Izabel work# [phone]  Call placed to pod.  Patient returned your call, please call back.  Thank you!

## 2017-08-22 ENCOUNTER — HOSPITAL ENCOUNTER (OUTPATIENT)
Dept: RADIOLOGY | Facility: HOSPITAL | Age: 72
Discharge: HOME | End: 2017-08-22
Attending: PODIATRIST
Payer: MEDICARE

## 2017-08-22 ENCOUNTER — OFFICE VISIT (OUTPATIENT)
Dept: PODIATRY | Facility: CLINIC | Age: 72
End: 2017-08-22
Payer: MEDICARE

## 2017-08-22 VITALS — BODY MASS INDEX: 31.4 KG/M2 | WEIGHT: 188.5 LBS | HEIGHT: 65 IN

## 2017-08-22 DIAGNOSIS — M21.6X2 ACQUIRED EQUINUS DEFORMITY OF BOTH FEET: ICD-10-CM

## 2017-08-22 DIAGNOSIS — M79.672 FOOT PAIN, BILATERAL: ICD-10-CM

## 2017-08-22 DIAGNOSIS — M21.6X1 ACQUIRED EQUINUS DEFORMITY OF BOTH FEET: ICD-10-CM

## 2017-08-22 DIAGNOSIS — M77.9 CAPSULITIS: Primary | ICD-10-CM

## 2017-08-22 DIAGNOSIS — M79.671 FOOT PAIN, BILATERAL: ICD-10-CM

## 2017-08-22 DIAGNOSIS — M77.9 CAPSULITIS: ICD-10-CM

## 2017-08-22 PROCEDURE — 29540 STRAPPING ANKLE &/FOOT: CPT | Mod: 50,S$PBB,, | Performed by: PODIATRIST

## 2017-08-22 PROCEDURE — 73630 X-RAY EXAM OF FOOT: CPT | Mod: 50,TC,PO

## 2017-08-22 PROCEDURE — 1159F MED LIST DOCD IN RCRD: CPT | Mod: ,,, | Performed by: PODIATRIST

## 2017-08-22 PROCEDURE — 99999 PR PBB SHADOW E&M-EST. PATIENT-LVL III: CPT | Mod: PBBFAC,,, | Performed by: PODIATRIST

## 2017-08-22 PROCEDURE — 99203 OFFICE O/P NEW LOW 30 MIN: CPT | Mod: 25,S$PBB,, | Performed by: PODIATRIST

## 2017-08-22 PROCEDURE — 1126F AMNT PAIN NOTED NONE PRSNT: CPT | Mod: ,,, | Performed by: PODIATRIST

## 2017-08-22 PROCEDURE — 73630 X-RAY EXAM OF FOOT: CPT | Mod: 26,50,, | Performed by: RADIOLOGY

## 2017-08-22 RX ORDER — ASPIRIN 325 MG
325 TABLET ORAL DAILY
COMMUNITY
End: 2018-10-09 | Stop reason: ALTCHOICE

## 2017-08-22 RX ORDER — LIDOCAINE HYDROCHLORIDE 20 MG/ML
JELLY TOPICAL
Qty: 30 ML | Refills: 2 | Status: SHIPPED | OUTPATIENT
Start: 2017-08-22 | End: 2017-11-14

## 2017-08-22 RX ORDER — NAPROXEN SODIUM 220 MG
440 TABLET ORAL DAILY
COMMUNITY
End: 2017-11-14

## 2017-08-22 NOTE — LETTER
August 22, 2017      Daniel Baptiste MD  [address] [facility] [address]           [facility] [address] [facility] [address] [place] [address]  Phone: [phone]          Patient: Michael Ann Jr.   MR Number: [id number]   YOB: 1945   Date of Visit: 8/22/2017       Dear Dr. Daniel Baptiste:    Thank you for referring Michael Ann to me for evaluation. Attached you will find relevant portions of my assessment and plan of care.    If you have questions, please do not hesitate to call me. I look forward to following Michael Ann along with you.    Sincerely,    Manohar Stout, DPSAHIL    Enclosure  CC:  No Recipients    If you would like to receive this communication electronically, please contact [email or web address] or [phone] to request more information on [employer] Link access.    For providers and/or their staff who would like to refer a patient to [facility], please contact us through our one-stop-shop provider referral line, [facility] , at [phone].    If you feel you have received this communication in error or would no longer like to receive these types of communications, please e-mail [email or web address]

## 2017-08-22 NOTE — PROGRESS NOTES
Subjective:      Patient ID: Michael Ann Jr. is a 72 y.o. male.    Chief Complaint: Foot Pain (Bilateral)    Sore throbbing pain both forefoot.  Gradual onset, worsening over past several weeks, aggravated by increased weight bearing, shoe gear, pressure.  No previous medical treatment.  OTC pain med not helping.  Denies trauma and surgery both feet.      Review of Systems   Constitution: Negative for chills, diaphoresis, fever, malaise/fatigue and night sweats.   Cardiovascular: Negative for claudication, cyanosis, leg swelling and syncope.   Skin: Negative for color change, dry skin, nail changes, rash, suspicious lesions and unusual hair distribution.   Musculoskeletal: Positive for joint pain. Negative for falls, joint swelling, muscle cramps, muscle weakness and stiffness.   Gastrointestinal: Negative for constipation, diarrhea, nausea and vomiting.   Neurological: Negative for brief paralysis, disturbances in coordination, focal weakness, numbness, paresthesias, sensory change and tremors.           Objective:      Physical Exam   Constitutional: He appears well-developed and well-nourished. He is cooperative. No distress.   Cardiovascular:   Pulses:       Popliteal pulses are 2+ on the right side, and 2+ on the left side.        Dorsalis pedis pulses are 1+ on the right side, and 1+ on the left side.        Posterior tibial pulses are 1+ on the right side, and 1+ on the left side.   Capillary refill 3 seconds all toes/distal feet, all toes/both feet warm to touch.      Negative lymphadenopathy bilateral popliteal fossa and tarsal tunnel.      Negavie lower extremity edema bilateral.     Musculoskeletal:        Right ankle: Normal. He exhibits normal range of motion, no swelling, no ecchymosis, no deformity, no laceration and normal pulse. Achilles tendon normal. Achilles tendon exhibits no pain, no defect and normal Hicks's test results.   Ankle dorsiflexion decreased at <10 degrees bilateral  with moderate increase with knee flexion bilateral.    Pain to palpation inferior mtpj 2-5 left and right without evidence of trauma or infection.     Otherwise, Normal angle, base, station of gait. All ten toes without clubbing, cyanosis, or signs of ischemia.  No pain to palpation bilateral lower extremities.  Range of motion, stability, muscle strength, and muscle tone normal bilateral feet and legs.     Lymphadenopathy: No inguinal adenopathy noted on the right or left side.   Negative lymphadenopathy bilateral popliteal fossa and tarsal tunnel.   Neurological: He is alert. He has normal strength. He displays no atrophy and no tremor. No sensory deficit. He exhibits normal muscle tone. He displays no seizure activity. Gait normal.   Reflex Scores:       Patellar reflexes are 2+ on the right side and 2+ on the left side.       Achilles reflexes are 2+ on the right side and 2+ on the left side.  Negative tinel sign to percussion sural, superficial peroneal, deep peroneal, saphenous, and posterior tibial nerves right and left ankles and feet.      Negative moulder sign/click bilateral all intermetatarsal spaces.     Skin: Skin is warm, dry and intact. No abrasion, no bruising, no burn, no ecchymosis, no laceration, no lesion and no rash noted. He is not diaphoretic. No cyanosis or erythema. No pallor. Nails show no clubbing.   Skin is normal age and health appropriate color, turgor, texture, and temperature bilateral lower extremities without ulceration, hyperpigmentation, discoloration, masses nodules or cords palpated.  No ecchymosis, erythema, edema, or cardinal signs of infection bilateral lower extremities.                 Assessment:       Encounter Diagnoses   Name Primary?    Capsulitis Yes    Acquired equinus deformity of both feet     Foot pain, bilateral          Plan:       Michael was seen today for foot pain.    Diagnoses and all orders for this visit:    Capsulitis  -     X-Ray Foot Complete  Bilateral; Future    Acquired equinus deformity of both feet  -     X-Ray Foot Complete Bilateral; Future    Foot pain, bilateral  -     X-Ray Foot Complete Bilateral; Future    Other orders  -     lidocaine HCL 2% (XYLOCAINE) 2 % jelly; Apply topically as needed. Apply topically once nightly to affected area both feet.      I counseled the patient on his conditions, their implications and medical management.        Patient will stretch the tendo achilles complex three times daily as demonstrated in the office.  Literature was dispensed illustrating proper stretching technique.    I applied a plantar rest strapping to the patient's right and left foot to offload symptomatic area, support the arch, and relieve pain.    Patient will obtain over the counter arch supports and wear them in shoes whenever possible.  Athletic shoes intended for walking or running are usually best.    The patient was advised that NSAID-type medications have two very important potential side effects: gastrointestinal irritation including hemorrhage and renal injuries. He was asked to take the medication with food and to stop if he experiences any GI upset. I asked him to call for vomiting, abdominal pain or black/bloody stools. The patient expresses understanding of these issues and questions were answered.    Discussed conservative treatment with shoes of adequate dimensions, material, and style to alleviate symptoms and delay or prevent surgical intervention.    Rx xrays, lidocaine gel.          Return in about 1 month (around 9/22/2017).

## 2017-08-24 DIAGNOSIS — Z12.11 COLON CANCER SCREENING: ICD-10-CM

## 2017-08-28 DIAGNOSIS — E78.5 HYPERLIPIDEMIA: ICD-10-CM

## 2017-08-28 DIAGNOSIS — I10 ESSENTIAL HYPERTENSION: ICD-10-CM

## 2017-08-28 RX ORDER — AMLODIPINE BESYLATE 2.5 MG/1
TABLET ORAL
Qty: 90 TABLET | Refills: 3 | Status: SHIPPED | OUTPATIENT
Start: 2017-08-28 | End: 2018-09-07 | Stop reason: SDUPTHER

## 2017-08-28 RX ORDER — PRAVASTATIN SODIUM 40 MG/1
TABLET ORAL
Qty: 90 TABLET | Refills: 3 | Status: SHIPPED | OUTPATIENT
Start: 2017-08-28 | End: 2017-10-31 | Stop reason: ALTCHOICE

## 2017-09-26 ENCOUNTER — OFFICE VISIT (OUTPATIENT)
Dept: FAMILY MEDICINE | Facility: CLINIC | Age: 72
End: 2017-09-26
Payer: MEDICARE

## 2017-09-26 ENCOUNTER — DOCUMENTATION ONLY (OUTPATIENT)
Dept: FAMILY MEDICINE | Facility: CLINIC | Age: 72
End: 2017-09-26

## 2017-09-26 VITALS
WEIGHT: 186.5 LBS | HEART RATE: 89 BPM | HEIGHT: 65 IN | OXYGEN SATURATION: 96 % | BODY MASS INDEX: 31.07 KG/M2 | TEMPERATURE: 98 F | RESPIRATION RATE: 16 BRPM | DIASTOLIC BLOOD PRESSURE: 88 MMHG | SYSTOLIC BLOOD PRESSURE: 144 MMHG

## 2017-09-26 DIAGNOSIS — G62.9 PERIPHERAL POLYNEUROPATHY: ICD-10-CM

## 2017-09-26 DIAGNOSIS — R53.1 WEAKNESS: ICD-10-CM

## 2017-09-26 DIAGNOSIS — M62.9 DISORDER OF MUSCLE: ICD-10-CM

## 2017-09-26 DIAGNOSIS — Z23 NEEDS FLU SHOT: ICD-10-CM

## 2017-09-26 DIAGNOSIS — G60.3 IDIOPATHIC PROGRESSIVE NEUROPATHY: ICD-10-CM

## 2017-09-26 DIAGNOSIS — L03.032 PARONYCHIA, TOE, LEFT: ICD-10-CM

## 2017-09-26 DIAGNOSIS — J98.6 ELEVATED HEMIDIAPHRAGM: ICD-10-CM

## 2017-09-26 DIAGNOSIS — R06.09 DYSPNEA ON EXERTION: Primary | ICD-10-CM

## 2017-09-26 PROCEDURE — 3079F DIAST BP 80-89 MM HG: CPT | Mod: S$GLB,,, | Performed by: INTERNAL MEDICINE

## 2017-09-26 PROCEDURE — 99214 OFFICE O/P EST MOD 30 MIN: CPT | Mod: S$GLB,,, | Performed by: INTERNAL MEDICINE

## 2017-09-26 PROCEDURE — 3077F SYST BP >= 140 MM HG: CPT | Mod: S$GLB,,, | Performed by: INTERNAL MEDICINE

## 2017-09-26 PROCEDURE — 90662 IIV NO PRSV INCREASED AG IM: CPT | Mod: S$GLB,,, | Performed by: INTERNAL MEDICINE

## 2017-09-26 PROCEDURE — G0008 ADMIN INFLUENZA VIRUS VAC: HCPCS | Mod: S$GLB,,, | Performed by: INTERNAL MEDICINE

## 2017-09-26 PROCEDURE — 1159F MED LIST DOCD IN RCRD: CPT | Mod: S$GLB,,, | Performed by: INTERNAL MEDICINE

## 2017-09-26 PROCEDURE — 1126F AMNT PAIN NOTED NONE PRSNT: CPT | Mod: S$GLB,,, | Performed by: INTERNAL MEDICINE

## 2017-09-26 RX ORDER — GABAPENTIN 100 MG/1
CAPSULE ORAL
Qty: 150 CAPSULE | Refills: 11 | Status: SHIPPED | OUTPATIENT
Start: 2017-09-26 | End: 2018-05-22

## 2017-09-26 NOTE — PROGRESS NOTES
"Subjective:       Patient ID: Michael Ann Jr. is a 72 y.o. male.    Chief Complaint: Hypertension (labs)    HPI   The patient has chronic shortness of breath since he was in the ICU over year ago.  Chest x-ray was done recently which shows elevated right hemidiaphragm.  He's getting more and short of breath as time goes on now 5/10 severity.  It's all dyspnea with exertion.  No fever.  He did have some chest pain and is waiting to see cardiologist    The patient is been having tingling in his feet for 3 months.  Saw podiatrist.  She will inserts in in his foot.  He they have not helped.        He developed a paronychia of his right great toe while on the inserts starting one month ago..  Has appointment with a podiatrist; he got some pus out and now is not having much pain.          withReview of Systems    Objective:      Vitals:    09/26/17 1028   BP: (!) 144/88   Pulse: 89   Resp: 16   Temp: 97.8 °F (36.6 °C)   TempSrc: Oral   SpO2: 96%   Weight: 84.6 kg (186 lb 8.2 oz)   Height: 5' 5" (1.651 m)   PainSc: 0-No pain     Physical Exam   Constitutional: He appears well-developed and well-nourished.   Cardiovascular: Normal rate, regular rhythm and normal heart sounds.    Pulmonary/Chest: Effort normal and breath sounds normal. No respiratory distress. He has no wheezes.   Abdominal: Soft. Bowel sounds are normal. There is no tenderness.   Musculoskeletal:        Right foot: There is normal range of motion and no deformity.        Left foot: There is normal range of motion and no deformity.   Feet:   Right Foot:   Protective Sensation: 5 sites tested. 5 sites sensed.   Skin Integrity: Negative for ulcer, blister, skin breakdown, erythema, warmth, callus or dry skin.   Left Foot:   Protective Sensation: 5 sites tested. 5 sites sensed.   Skin Integrity: Negative for ulcer, blister, skin breakdown, erythema, warmth, callus or dry skin.    percussion of the lungs shows that the right hemidiaphragm is not " moving ear as well as left hemidiaphragm.  Chest x-ray shows elevated right hemidiaphragm       Assessment:       No diagnosis found.      Plan:     There are no diagnoses linked to this encounter.  No Follow-up on file.

## 2017-09-26 NOTE — PROGRESS NOTES
ID patient by name and date of birth.  Allergies confirmed.  Immunization given as per orders , using aseptic technique.  Patient tolerated well.  Information sheet reviewed and given to patient.0/10 pain scale ; no bleeding at insertion site.

## 2017-10-02 ENCOUNTER — TELEPHONE (OUTPATIENT)
Dept: FAMILY MEDICINE | Facility: CLINIC | Age: 72
End: 2017-10-02

## 2017-10-02 ENCOUNTER — HOSPITAL ENCOUNTER (OUTPATIENT)
Dept: RADIOLOGY | Facility: HOSPITAL | Age: 72
Discharge: HOME | End: 2017-10-02
Attending: INTERNAL MEDICINE
Payer: MEDICARE

## 2017-10-02 ENCOUNTER — OFFICE VISIT (OUTPATIENT)
Dept: PODIATRY | Facility: CLINIC | Age: 72
End: 2017-10-02
Payer: MEDICARE

## 2017-10-02 VITALS — WEIGHT: 186.31 LBS | HEIGHT: 65 IN | BODY MASS INDEX: 31.04 KG/M2

## 2017-10-02 DIAGNOSIS — M21.6X2 ACQUIRED EQUINUS DEFORMITY OF BOTH FEET: ICD-10-CM

## 2017-10-02 DIAGNOSIS — M79.671 FOOT PAIN, BILATERAL: ICD-10-CM

## 2017-10-02 DIAGNOSIS — G56.80 PHRENIC NERVE PARALYSIS: Primary | ICD-10-CM

## 2017-10-02 DIAGNOSIS — M79.672 FOOT PAIN, BILATERAL: ICD-10-CM

## 2017-10-02 DIAGNOSIS — J98.6 ELEVATED HEMIDIAPHRAGM: ICD-10-CM

## 2017-10-02 DIAGNOSIS — R06.09 DYSPNEA ON EXERTION: ICD-10-CM

## 2017-10-02 DIAGNOSIS — M77.9 CAPSULITIS: Primary | ICD-10-CM

## 2017-10-02 DIAGNOSIS — M21.6X1 ACQUIRED EQUINUS DEFORMITY OF BOTH FEET: ICD-10-CM

## 2017-10-02 PROCEDURE — 76000 FLUOROSCOPY <1 HR PHYS/QHP: CPT | Mod: TC

## 2017-10-02 PROCEDURE — 1159F MED LIST DOCD IN RCRD: CPT | Mod: ,,, | Performed by: PODIATRIST

## 2017-10-02 PROCEDURE — 99213 OFFICE O/P EST LOW 20 MIN: CPT | Mod: PBBFAC,PO | Performed by: PODIATRIST

## 2017-10-02 PROCEDURE — 76000 FLUOROSCOPY <1 HR PHYS/QHP: CPT | Mod: 26,,, | Performed by: RADIOLOGY

## 2017-10-02 PROCEDURE — 1126F AMNT PAIN NOTED NONE PRSNT: CPT | Mod: ,,, | Performed by: PODIATRIST

## 2017-10-02 PROCEDURE — 99999 PR PBB SHADOW E&M-EST. PATIENT-LVL III: CPT | Mod: PBBFAC,,, | Performed by: PODIATRIST

## 2017-10-02 PROCEDURE — 99212 OFFICE O/P EST SF 10 MIN: CPT | Mod: S$PBB,,, | Performed by: PODIATRIST

## 2017-10-02 NOTE — PROGRESS NOTES
Subjective:      Patient ID: Michael Ann Jr. is a 72 y.o. male.    Chief Complaint: Foot Pain (follow up )    Sore throbbing pain both forefoot.  Dramatic improvement from inserts, stretches, athletic shoes.  xrays negative acute injury.        Review of Systems   Constitution: Negative for chills, diaphoresis, fever, malaise/fatigue and night sweats.   Cardiovascular: Negative for claudication, cyanosis, leg swelling and syncope.   Skin: Negative for color change, dry skin, nail changes, rash, suspicious lesions and unusual hair distribution.   Musculoskeletal: Positive for joint pain. Negative for falls, joint swelling, muscle cramps, muscle weakness and stiffness.   Gastrointestinal: Negative for constipation, diarrhea, nausea and vomiting.   Neurological: Negative for brief paralysis, disturbances in coordination, focal weakness, numbness, paresthesias, sensory change and tremors.           Objective:      Physical Exam   Constitutional: He appears well-developed and well-nourished. He is cooperative. No distress.   Cardiovascular:   Pulses:       Popliteal pulses are 2+ on the right side, and 2+ on the left side.        Dorsalis pedis pulses are 1+ on the right side, and 1+ on the left side.        Posterior tibial pulses are 1+ on the right side, and 1+ on the left side.   Capillary refill 3 seconds all toes/distal feet, all toes/both feet warm to touch.      Negative lymphadenopathy bilateral popliteal fossa and tarsal tunnel.      Negavie lower extremity edema bilateral.     Musculoskeletal:        Right ankle: Normal. He exhibits normal range of motion, no swelling, no ecchymosis, no deformity, no laceration and normal pulse. Achilles tendon normal. Achilles tendon exhibits no pain, no defect and normal Hicks's test results.   Ankle dorsiflexion decreased at <10 degrees bilateral with moderate increase with knee flexion bilateral.    No current pain to palpation inferior mtpj 2-5 left and  right without evidence of trauma or infection.     Otherwise, Normal angle, base, station of gait. All ten toes without clubbing, cyanosis, or signs of ischemia.  No pain to palpation bilateral lower extremities.  Range of motion, stability, muscle strength, and muscle tone normal bilateral feet and legs.     Lymphadenopathy: No inguinal adenopathy noted on the right or left side.   Negative lymphadenopathy bilateral popliteal fossa and tarsal tunnel.   Neurological: He is alert. He has normal strength. He displays no atrophy and no tremor. No sensory deficit. He exhibits normal muscle tone. He displays no seizure activity. Gait normal.   Reflex Scores:       Patellar reflexes are 2+ on the right side and 2+ on the left side.       Achilles reflexes are 2+ on the right side and 2+ on the left side.  Negative tinel sign to percussion sural, superficial peroneal, deep peroneal, saphenous, and posterior tibial nerves right and left ankles and feet.      Negative moulder sign/click bilateral all intermetatarsal spaces.     Skin: Skin is warm, dry and intact. No abrasion, no bruising, no burn, no ecchymosis, no laceration, no lesion and no rash noted. He is not diaphoretic. No cyanosis or erythema. No pallor. Nails show no clubbing.   Skin is normal age and health appropriate color, turgor, texture, and temperature bilateral lower extremities without ulceration, hyperpigmentation, discoloration, masses nodules or cords palpated.  No ecchymosis, erythema, edema, or cardinal signs of infection bilateral lower extremities.                 Assessment:       Encounter Diagnoses   Name Primary?    Capsulitis Yes    Acquired equinus deformity of both feet     Foot pain, bilateral          Plan:       Michael was seen today for foot pain.    Diagnoses and all orders for this visit:    Capsulitis    Acquired equinus deformity of both feet    Foot pain, bilateral      I counseled the patient on his conditions, their  implications and medical management.    Continue stretches, inserts, athletic shoes, neurontin via PCP.    rtc prn.        No Follow-up on file.

## 2017-10-18 ENCOUNTER — TELEPHONE (OUTPATIENT)
Dept: FAMILY MEDICINE | Facility: CLINIC | Age: 72
End: 2017-10-18

## 2017-10-18 NOTE — TELEPHONE ENCOUNTER
----- Message from Amaya Tirado sent at 10/18/2017  8:19 AM CDT -----  Wife/Izabel is calling for the results of test done 10/2/17. Please call with results at [phone].

## 2017-10-19 ENCOUNTER — TELEPHONE (OUTPATIENT)
Dept: FAMILY MEDICINE | Facility: CLINIC | Age: 72
End: 2017-10-19

## 2017-10-19 NOTE — TELEPHONE ENCOUNTER
----- Message from Jacinta Monge sent at 10/19/2017  8:44 AM CDT -----  Contact: wife,  elenita   Please call work phone ONLY   For lab results     Please do not call home number

## 2017-10-19 NOTE — TELEPHONE ENCOUNTER
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

## 2017-10-20 NOTE — TELEPHONE ENCOUNTER
If he's having trouble breathing L try to make it a more urgent case.  Let me know how he is doing

## 2017-10-31 ENCOUNTER — OFFICE VISIT (OUTPATIENT)
Dept: CARDIOLOGY | Facility: CLINIC | Age: 72
End: 2017-10-31
Payer: MEDICARE

## 2017-10-31 VITALS
HEART RATE: 78 BPM | OXYGEN SATURATION: 96 % | WEIGHT: 186.5 LBS | HEIGHT: 65 IN | BODY MASS INDEX: 31.07 KG/M2 | DIASTOLIC BLOOD PRESSURE: 91 MMHG | SYSTOLIC BLOOD PRESSURE: 155 MMHG

## 2017-10-31 DIAGNOSIS — I10 ESSENTIAL HYPERTENSION: Primary | ICD-10-CM

## 2017-10-31 DIAGNOSIS — R06.09 DOE (DYSPNEA ON EXERTION): ICD-10-CM

## 2017-10-31 DIAGNOSIS — N42.89 PROSTATE MASS: ICD-10-CM

## 2017-10-31 DIAGNOSIS — F41.9 ANXIETY: ICD-10-CM

## 2017-10-31 DIAGNOSIS — Z82.49 FAMILY HISTORY OF ASCVD: ICD-10-CM

## 2017-10-31 PROCEDURE — 99999 PR PBB SHADOW E&M-EST. PATIENT-LVL III: CPT | Mod: PBBFAC,,, | Performed by: INTERNAL MEDICINE

## 2017-10-31 PROCEDURE — 99213 OFFICE O/P EST LOW 20 MIN: CPT | Mod: PBBFAC,PO | Performed by: INTERNAL MEDICINE

## 2017-10-31 PROCEDURE — 99205 OFFICE O/P NEW HI 60 MIN: CPT | Mod: S$PBB,,, | Performed by: INTERNAL MEDICINE

## 2017-10-31 RX ORDER — ATORVASTATIN CALCIUM 40 MG/1
40 TABLET, FILM COATED ORAL DAILY
Qty: 90 TABLET | Refills: 3 | Status: SHIPPED | OUTPATIENT
Start: 2017-10-31 | End: 2018-11-18 | Stop reason: SDUPTHER

## 2017-10-31 NOTE — PATIENT INSTRUCTIONS
-PET stress test and echo.  Pt would like to have testing done in January 2018.  -Start atorvastatin 40 mg daily and discontinue pravastatin  -Stop taking NSAIDS and use Tylenol for pain relief  -Follow up after testing is completed in January 2018

## 2017-10-31 NOTE — LETTER
October 31, 2017      Daniel Baptiste MD  [address]  [place] [address]           [place] [facility]  [address]  [place] [address]  Phone: [phone]          Patient: Michael Ann Jr.   MR Number: [id number]   YOB: 1945   Date of Visit: 10/31/2017       Dear Dr. Daniel Baptiste:    Thank you for referring Michael Ann to me for evaluation. Attached you will find relevant portions of my assessment and plan of care.    If you have questions, please do not hesitate to call me. I look forward to following Michael Ann along with you.    Sincerely,    Braxton Riggs MD    Enclosure  CC:  No Recipients    If you would like to receive this communication electronically, please contact [email or web address] or [phone] to request more information on [employer] Link access.    For providers and/or their staff who would like to refer a patient to [facility], please contact us through our one-stop-shop provider referral line, [facility], at [phone].    If you feel you have received this communication in error or would no longer like to receive these types of communications, please e-mail [email or web address]

## 2017-10-31 NOTE — PROGRESS NOTES
"[facility]    CC:   Chief Complaint   Patient presents with    Consult       Patient ID: Michael Ann Jr. is a 72 y.o. male with a past medical history of HTN, prostate cancer s/p excision in 2015, who presents for an initial appointment.  Pertinent history/events are as follows:     -Family history of MI in his father at age 53.  -Reports having a normal stress test 5 years ago at [facility].   -Reports having chronic LE edema since having prostate surgery.    HPI:  Mr. Ann complains of occasional SOB on exertion.  Also gets short of breath when he bends over and ties his shoes.  He is very concerned that he may have significant coronary artery disease and "badly wants to get checked out".  Calculated 10 year ASCVD risk is high at 27%.       Past Medical History:   Diagnosis Date    Hypertension      Past Surgical History:   Procedure Laterality Date    HAND SURGERY      HERNIA REPAIR      NECK SURGERY      PROSTATECTOMY       Social History     Social History    Marital status:      Spouse name: N/A    Number of children: N/A    Years of education: N/A     Occupational History    Not on file.     Social History Main Topics    Smoking status: Never Smoker    Smokeless tobacco: Never Used    Alcohol use No    Drug use: No    Sexual activity: Not on file     Other Topics Concern    Not on file     Social History Narrative    No narrative on file     No family history on file.    Review of patient's allergies indicates:  No Known Allergies    Medication List with Changes/Refills   Current Medications    AMLODIPINE (NORVASC) 2.5 MG TABLET    TAKE ONE TABLET BY MOUTH ONCE DAILY.    ASPIRIN 325 MG TABLET    Take 325 mg by mouth once daily.    GABAPENTIN (NEURONTIN) 100 MG CAPSULE    1 by mouth in the morning 1 by mouth in the afternoon and 3 by mouth at bedtime    LIDOCAINE HCL 2% (XYLOCAINE) 2 % JELLY    Apply topically as needed. Apply topically once nightly " "to affected area both feet.    LORATADINE (CLARITIN ORAL)    Take by mouth once daily.    NAPROXEN SODIUM (ANAPROX) 220 MG TABLET    Take 440 mg by mouth once daily.    OMEPRAZOLE (PRILOSEC) 20 MG CAPSULE    Take 1 capsule (20 mg total) by mouth once daily.    PRAVASTATIN (PRAVACHOL) 40 MG TABLET    TAKE ONE TABLET BY MOUTH ONCE DAILY.  START AFTER 08/23/2016.       Review of Systems  Constitution: Denies chills, fever, and sweats.  HENT: Denies headaches or blurry vision.  Cardiovascular: Denies chest pain or irregular heart beat.  Respiratory: Positive for LAU.  Gastrointestinal: Denies abdominal pain, nausea, or vomiting.  Musculoskeletal: Positive for chronic LE edema.  Neurological: Denies dizziness or focal weakness.  Psychiatric/Behavioral: Normal mental status.  Hematologic/Lymphatic: Denies bleeding problem or easy bruising/bleeding.  Skin: Denies rash or suspicious lesions    Physical Examination  BP (!) 155/91 (BP Location: Left arm)   Pulse 78   Ht 5' 5" (1.651 m)   Wt 84.6 kg (186 lb 8.2 oz)   SpO2 96%   BMI 31.04 kg/m²     Constitutional: No acute distress, conversant  HEENT: Sclera anicteric, Pupils equal, round and reactive to light, extraocular motions intact, Oropharynx clear  Neck: No JVD, no carotid bruits  Cardiovascular: regular rate and rhythm, no murmur, rubs or gallops, normal S1/S2  Pulmonary: Clear to auscultation bilaterally  Abdominal: Abdomen soft, nontender, nondistended, positive bowel sounds  Extremities: Mild lower extremity edema (L>R)   Pulses:  Carotid pulses are 2+ on the right side, and 2+ on the left side.  Radial pulses are 2+ on the right side, and 2+ on the left side.   Femoral pulses are 2+ on the right side, and 2+ on the left side.  Skin: No ecchymosis, erythema, or ulcers  Psych: Alert and oriented x 3, appropriate affect  Neuro: CNII-XII intact, no focal deficits    Labs:  Most Recent Data  CBC:   Lab Results   Component Value Date    WBC 8.00 08/15/2017    HGB " 15.8 08/15/2017    HCT 46.5 08/15/2017     08/15/2017    MCV 93 08/15/2017    RDW 14.5 08/15/2017     BMP:   Lab Results   Component Value Date     08/15/2017    K 4.1 08/15/2017     08/15/2017    CO2 24 08/15/2017    BUN 15 08/15/2017    CREATININE 0.8 08/15/2017    GLU 92 08/15/2017    CALCIUM 9.5 08/15/2017     LFTS;   Lab Results   Component Value Date    PROT 6.8 08/15/2017    ALBUMIN 3.7 08/15/2017    BILITOT 0.9 08/15/2017    AST 48 (H) 08/15/2017    ALKPHOS 49 (L) 08/15/2017    ALT 50 (H) 08/15/2017     COAGS: No results found for: INR, PROTIME, PTT  FLP:   Lab Results   Component Value Date    CHOL 180 08/08/2017    HDL 51 08/08/2017    LDLCALC 116.4 08/08/2017    TRIG 63 08/08/2017    CHOLHDL 28.3 08/08/2017     CARDIAC:   Lab Results   Component Value Date    BNP 23 08/15/2017       EKG 8/15/2017:  Normal sinus rhythm  No significant ST/T wave changes    Assessment/Plan:  Michael Duvall Marissa Cook. is a 72 y.o. male with a past medical history of HTN, prostate cancer s/p surgical excision in 2015, who presents for an initial appointment.    1. Dyspnea on Exertion- Pt reports significant LAU.  High ASCVD risk of 27%.  Also with chronic LE edema.  Check PET stress test and echo to evaluate further.  Pt would like to have all testing done in early January 2018.      2 HTN- Continue amlodipine 2.5 mg daily.  Pt to keep log of blood pressure/heart rate and bring in next visit for review.     3. Elevated ASCVD Risk- Stop pravastatin and start atorvastatin 40 mg daily.  LFT's not significantly elevated.  Continue ASA 81 mg daily.  Continue to monitor.     Follow up after testing completed in January 2018 per pt's request    Thank you for the interesting consult.  Please don't hesitate to call with any questions.    Total duration of face to face visit time 30 minutes.  Total time spent counseling greater than fifty percent of total visit time.  Counseling included discussion regarding imaging  findings, diagnosis, possibilities, treatment options, risks and benefits.  The patient had many questions regarding the options and long-term effects.    Braxton Riggs MD, PhD  Interventional Cardiology

## 2017-11-13 ENCOUNTER — DOCUMENTATION ONLY (OUTPATIENT)
Dept: FAMILY MEDICINE | Facility: CLINIC | Age: 72
End: 2017-11-13

## 2017-11-13 NOTE — PROGRESS NOTES
Health Maintenance Due   Topic Date Due    Colonoscopy  03/30/1997    Zoster Vaccine  03/30/2007

## 2017-11-14 ENCOUNTER — OFFICE VISIT (OUTPATIENT)
Dept: FAMILY MEDICINE | Facility: CLINIC | Age: 72
End: 2017-11-14
Payer: MEDICARE

## 2017-11-14 VITALS
RESPIRATION RATE: 16 BRPM | WEIGHT: 186.5 LBS | HEIGHT: 65 IN | DIASTOLIC BLOOD PRESSURE: 97 MMHG | TEMPERATURE: 98 F | SYSTOLIC BLOOD PRESSURE: 152 MMHG | HEART RATE: 72 BPM | BODY MASS INDEX: 31.07 KG/M2 | OXYGEN SATURATION: 96 %

## 2017-11-14 DIAGNOSIS — E78.5 HYPERLIPIDEMIA, UNSPECIFIED HYPERLIPIDEMIA TYPE: ICD-10-CM

## 2017-11-14 DIAGNOSIS — I10 ESSENTIAL HYPERTENSION: Primary | ICD-10-CM

## 2017-11-14 DIAGNOSIS — J98.6 PARALYSIS, DIAPHRAGM: ICD-10-CM

## 2017-11-14 DIAGNOSIS — R60.0 BILATERAL LEG EDEMA: ICD-10-CM

## 2017-11-14 DIAGNOSIS — Z12.11 COLON CANCER SCREENING: ICD-10-CM

## 2017-11-14 PROCEDURE — 99214 OFFICE O/P EST MOD 30 MIN: CPT | Mod: S$GLB,,, | Performed by: INTERNAL MEDICINE

## 2017-11-14 RX ORDER — TRIAMTERENE/HYDROCHLOROTHIAZID 37.5-25 MG
1 TABLET ORAL DAILY
Qty: 30 TABLET | Refills: 11 | Status: SHIPPED | OUTPATIENT
Start: 2017-11-14 | End: 2018-11-18 | Stop reason: SDUPTHER

## 2017-11-14 NOTE — PATIENT INSTRUCTIONS
Lose 5 pounds.  Suggest [facility]    Low-Salt Diet  This diet removes foods that are high in salt. It also limits the amount of salt you use when cooking. It is most often used for people with high blood pressure, edema (fluid retention), and kidney, liver, or heart disease.  Table salt contains the mineral sodium. Your body needs sodium to work normally. But too much sodium can make your health problems worse. Your healthcare provider is recommending a low-salt (also called low-sodium) diet for you. Your total daily allowance of salt is 1,500 to 2,300 milligrams (mg). It is less than 1 teaspoon of table salt. This means you can have only about 500 to 700 mg of sodium at each meal. People with certain health problems should limit salt intake to the lower end of the recommended range.    When you cook, dont add much salt. If you can cook without using salt, even better. Dont add salt to your food at the table.  When shopping, read food labels. Salt is often called sodium on the label. Choose foods that are salt-free, low salt, or very low salt. Note that foods with reduced salt may not lower your salt intake enough.    Beans, potatoes, and pasta  Ok: Dry beans, split peas, lentils, potatoes, rice, macaroni, pasta, spaghetti without added salt  Avoid: Potato chips, tortilla chips, and similar products  Breads and cereals  Ok: Low-sodium breads, rolls, cereals, and cakes; low-salt crackers, matzo crackers  Avoid: Salted crackers, pretzels, popcorn, Uzbek toast, pancakes, muffins  Dairy  Ok: Milk, chocolate milk, hot chocolate mix, low-salt cheeses, and yogurt  Avoid: Processed cheese and cheese spreads; Roquefort, Camembert, and cottage cheese; buttermilk, instant breakfast drink  Desserts  Ok: Ice cream, frozen yogurt, juice bars, gelatin, cookies and pies, sugar, honey, jelly, hard candy  Avoid: Most pies, cakes and cookies prepared or processed with salt; instant pudding  Drinks  Ok: Tea, coffee, fizzy  (carbonated) drinks, juices  Avoid: Flavored coffees, electrolyte replacement drinks, sports drinks  Meats  Ok: All fresh meat, fish, poultry, low-salt tuna, eggs, egg substitute  Avoid: Smoked, pickled, brine-cured, or salted meats and fish. This includes barraza, chipped beef, corned beef, hot dogs, deli meats, ham, kosher meats, salt pork, sausage, canned tuna, salted codfish, smoked salmon, herring, sardines, or anchovies.  Seasonings and spices  Ok: Most seasonings are okay. Good substitutes for salt include: fresh herb blends, hot sauce, lemon, garlic, barton, vinegar, dry mustard, parsley, cilantro, horseradish, tomato paste, regular margarine, mayonnaise, unsalted butter, cream cheese, vegetable oil, cream, low-salt salad dressing and gravy.  Avoid: Regular ketchup, relishes, pickles, soy sauce, teriyaki sauce, Worcestershire sauce, BBQ sauce, tartar sauce, meat tenderizer, chili sauce, regular gravy, regular salad dressing, salted butter  Soups  Ok: Low-salt soups and broths made with allowed foods  Avoid: Bouillon cubes, soups with smoked or salted meats, regular soup and broth  Vegetables  Ok: Most vegetables are okay; also low-salt tomato and vegetable juices  Avoid: Sauerkraut and other brine-soaked vegetables; pickles and other pickled vegetables; tomato juice, olives  © 3468-9524 [employer]. [address] [place] [address]. All rights reserved. This information is not intended as a substitute for professional medical care. Always follow your healthcare professional's instructions.

## 2017-11-14 NOTE — PROGRESS NOTES
Subjective:       Patient ID: Michael Ann Jr. is a 72 y.o. male.    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  He is already scheduled to see pulmonary about this..       CHIEF COMPLAINT: Hypertension  HPI:     ONSET:      QUALITY/COURSE:   Unchanged.     INTENSITY/SEVERITY:  Average blood pressure is 145/85 .     MODIFIERS/TREATMENTS:  Taking medications: yes. .High sodium intake: no. alcohol: no      The following symptoms are positive only if BOLDED, otherwise are negative.      SYMPTOMS/RELATED: Possible medication side effects include:   Depression..  . Cough. . Constipation.    REVIEW OF SYMPTOMS: . Weight_loss . Weight_gain . Leg_cramps .Potency_problems .    TARGET ORGAN DAMAGE:: angina/ prior myocardial infarction, chronic kidney disease, heart failure, left ventricular hypertrophy, peripheral artery disease, prior coronary revascularization, retinopathy, stroke. transient ischemic attack.        CHIEF COMPLAINT: Hyperlipidemia. cholesterol screening: no.   HPI: Cardiology recently saw him and calculated his AV risk at 23%.  He was therefore put on Lipitor 40 for high-risk.    ONSET:    MODIFIERS/TREATMENTS: . Taking medications: yes. . Non-compliance with following diet: no. .     SYMPTOMS/RELATED:Possible medication side effects include:   Myalgia: no.  .     REVIEW OF SYMPTOMS: past weights:   Wt Readings from Last 1 Encounters:   11/14/17 0827 84.6 kg (186 lb 8.2 oz)                                                     Last lipids: total   Lab Results   Component Value Date    CHOL 180 08/08/2017    CHOL 146 02/07/2017    CHOL 158 11/08/2016                                                                     HDL   Lab Results   Component Value Date    HDL 51 08/08/2017    HDL 52 02/07/2017    HDL 49 11/08/2016                                                                     LDL   Lab Results   Component  "Value Date    LDLCALC 116.4 08/08/2017    LDLCALC 81.8 02/07/2017    LDLCALC 97.4 11/08/2016                                                                     TRIG   Lab Results   Component Value Date    TRIG 63 08/08/2017    TRIG 61 02/07/2017    TRIG 58 11/08/2016                                                                         Review of Systems   Eyes: Negative for visual disturbance.   Respiratory: Negative for chest tightness and shortness of breath.    Cardiovascular: Positive for leg swelling. Negative for chest pain.   Neurological: Negative for dizziness, syncope, weakness and headaches.   Psychiatric/Behavioral: Negative for dysphoric mood. The patient is not nervous/anxious.        Objective:      Vitals:    11/14/17 0827   BP: (!) 152/97   Pulse: 72   Resp: 16   Temp: 97.8 °F (36.6 °C)   TempSrc: Oral   SpO2: 96%   Weight: 84.6 kg (186 lb 8.2 oz)   Height: 5' 5" (1.651 m)   PainSc: 0-No pain     Physical Exam   Constitutional: He appears well-developed and well-nourished.   Eyes: Pupils are equal, round, and reactive to light.   Cardiovascular: Normal rate, regular rhythm and normal heart sounds.    Pulmonary/Chest: Effort normal and breath sounds normal.   Abdominal: Soft. There is no tenderness.   Musculoskeletal: He exhibits no edema.   Neurological: He is alert.   Psychiatric: He has a normal mood and affect. His behavior is normal. Thought content normal.   Nursing note and vitals reviewed.        Assessment:       1. Essential hypertension    2. Hyperlipidemia, unspecified hyperlipidemia type    3. Bilateral leg edema    4. Colon cancer screening          Plan:     Essential hypertension  -     triamterene-hydrochlorothiazide 37.5-25 mg (MAXZIDE-25) 37.5-25 mg per tablet; Take 1 tablet by mouth once daily.  Dispense: 30 tablet; Refill: 11  -     Comprehensive metabolic panel; Future; Expected date: 11/14/2017    Hyperlipidemia, unspecified hyperlipidemia type  -     Lipid panel; Future; " Expected date: 11/14/2017    Bilateral leg edema  -     triamterene-hydrochlorothiazide 37.5-25 mg (MAXZIDE-25) 37.5-25 mg per tablet; Take 1 tablet by mouth once daily.  Dispense: 30 tablet; Refill: 11  -     Comprehensive metabolic panel; Future; Expected date: 11/14/2017    Colon cancer screening  -     Fecal Immunochemical Test (iFOBT); Future; Expected date: 11/28/2017      Return in about 3 months (around 2/14/2018).

## 2017-12-19 ENCOUNTER — CLINICAL SUPPORT (OUTPATIENT)
Dept: FAMILY MEDICINE | Facility: CLINIC | Age: 72
End: 2017-12-19
Payer: MEDICARE

## 2017-12-19 ENCOUNTER — TELEPHONE (OUTPATIENT)
Dept: FAMILY MEDICINE | Facility: CLINIC | Age: 72
End: 2017-12-19

## 2017-12-19 VITALS — DIASTOLIC BLOOD PRESSURE: 62 MMHG | SYSTOLIC BLOOD PRESSURE: 108 MMHG

## 2017-12-19 NOTE — PROGRESS NOTES
In for blood pressure check.  Last blood pressure at visit was 152/97 .  Last medication dose was today.  Blood pressure at home 134-111/84-66 .  Blood pressure today is 108/62.

## 2018-01-02 ENCOUNTER — CLINICAL SUPPORT (OUTPATIENT)
Dept: CARDIOLOGY | Facility: CLINIC | Age: 73
End: 2018-01-02
Payer: MEDICARE

## 2018-01-02 DIAGNOSIS — I10 ESSENTIAL HYPERTENSION: ICD-10-CM

## 2018-01-02 DIAGNOSIS — N42.89 PROSTATE MASS: ICD-10-CM

## 2018-01-02 DIAGNOSIS — F41.9 ANXIETY: ICD-10-CM

## 2018-01-02 DIAGNOSIS — R06.09 DOE (DYSPNEA ON EXERTION): ICD-10-CM

## 2018-01-02 PROCEDURE — 93306 TTE W/DOPPLER COMPLETE: CPT | Mod: PBBFAC,PO | Performed by: INTERNAL MEDICINE

## 2018-01-05 LAB
DIASTOLIC DYSFUNCTION: NO
ESTIMATED PA SYSTOLIC PRESSURE: 22.66
MITRAL VALVE REGURGITATION: NORMAL
RETIRED EF AND QEF - SEE NOTES: 55 (ref 55–65)
TRICUSPID VALVE REGURGITATION: NORMAL

## 2018-01-08 ENCOUNTER — CLINICAL SUPPORT (OUTPATIENT)
Dept: CARDIOLOGY | Facility: CLINIC | Age: 73
End: 2018-01-08
Attending: INTERNAL MEDICINE
Payer: MEDICARE

## 2018-01-08 DIAGNOSIS — N42.89 PROSTATE MASS: ICD-10-CM

## 2018-01-08 DIAGNOSIS — I10 ESSENTIAL HYPERTENSION: ICD-10-CM

## 2018-01-08 DIAGNOSIS — R06.09 DOE (DYSPNEA ON EXERTION): ICD-10-CM

## 2018-01-08 DIAGNOSIS — F41.9 ANXIETY: ICD-10-CM

## 2018-01-08 PROCEDURE — 78492 MYOCRD IMG PET MLT RST&STRS: CPT | Mod: 26,S$PBB,, | Performed by: INTERNAL MEDICINE

## 2018-01-08 PROCEDURE — 93018 CV STRESS TEST I&R ONLY: CPT | Mod: S$PBB,,, | Performed by: INTERNAL MEDICINE

## 2018-01-08 PROCEDURE — 93016 CV STRESS TEST SUPVJ ONLY: CPT | Mod: S$PBB,,, | Performed by: INTERNAL MEDICINE

## 2018-01-25 ENCOUNTER — TELEPHONE (OUTPATIENT)
Dept: PULMONOLOGY | Facility: CLINIC | Age: 73
End: 2018-01-25

## 2018-01-25 NOTE — TELEPHONE ENCOUNTER
Rescheduled with pt on the phone.     ----- Message from Neena Dobson sent at 1/25/2018 10:04 AM CST -----  Contact: Izabel Ann (Spouse)  Izabel Ann (Spouse) returning a missed call about an appt. Please advise. Call to pod. Call connected to pod. Warm transferred.  Thanks!

## 2018-01-30 ENCOUNTER — OFFICE VISIT (OUTPATIENT)
Dept: PULMONOLOGY | Facility: CLINIC | Age: 73
End: 2018-01-30
Payer: MEDICARE

## 2018-01-30 VITALS
OXYGEN SATURATION: 95 % | HEIGHT: 65 IN | BODY MASS INDEX: 30.16 KG/M2 | SYSTOLIC BLOOD PRESSURE: 126 MMHG | DIASTOLIC BLOOD PRESSURE: 76 MMHG | WEIGHT: 181 LBS | HEART RATE: 85 BPM

## 2018-01-30 DIAGNOSIS — K44.9 DIAPHRAGMATIC HERNIA WITHOUT OBSTRUCTION AND WITHOUT GANGRENE: ICD-10-CM

## 2018-01-30 DIAGNOSIS — J98.6 DIAPHRAGM PARALYSIS: Primary | ICD-10-CM

## 2018-01-30 PROCEDURE — 99204 OFFICE O/P NEW MOD 45 MIN: CPT | Mod: S$PBB,,, | Performed by: INTERNAL MEDICINE

## 2018-01-30 PROCEDURE — 99213 OFFICE O/P EST LOW 20 MIN: CPT | Mod: PBBFAC,PO | Performed by: INTERNAL MEDICINE

## 2018-01-30 PROCEDURE — 99999 PR PBB SHADOW E&M-EST. PATIENT-LVL III: CPT | Mod: PBBFAC,,, | Performed by: INTERNAL MEDICINE

## 2018-01-30 NOTE — LETTER
January 30, 2018      Daniel Baptiste MD  [address]  [place] [address]           [facility]  [address]  [place] [address]  Phone: [phone]  Fax: [phone]          Patient: Michael Ann Jr.   MR Number: [id number]   YOB: 1945   Date of Visit: 1/30/2018       Dear Dr. Daniel Baptiste:    Thank you for referring Michael Ann to me for evaluation. Attached you will find relevant portions of my assessment and plan of care.    If you have questions, please do not hesitate to call me. I look forward to following Michael Ann along with you.    Sincerely,    Oscar Huffman MD    Enclosure  CC:  No Recipients    If you would like to receive this communication electronically, please contact [email or web address] or [phone] to request more information on [employer] access.    For providers and/or their staff who would like to refer a patient to [facility], please contact us through our one-stop-shop provider referral line, [facility], at [phone].    If you feel you have received this communication in error or would no longer like to receive these types of communications, please e-mail [email or web address]

## 2018-01-30 NOTE — PROGRESS NOTES
"1/30/2018    Michael Ann Jr.  New Patient Consult    Chief Complaint   Patient presents with    Dyspnea on Exertion    Abnormal Chest X-ray       HPI:  Works as  - still working.  Never smoker.  Was sob but given fluid pill and resolved.  No payne.  No asthma nor copd family.        The chief compliant  problem is new to me",   PFSH:  Past Medical History:   Diagnosis Date    Hypertension          Past Surgical History:   Procedure Laterality Date    HAND SURGERY      HERNIA REPAIR      NECK SURGERY      PROSTATECTOMY       Social History   Substance Use Topics    Smoking status: Never Smoker    Smokeless tobacco: Never Used    Alcohol use No     History reviewed. No pertinent family history.  Review of patient's allergies indicates:  No Known Allergies    Performance Status:The patient's activity level is functions out of house.      Review of Systems:  a review of eleven systems covering constitutional, Eye, HEENT, Psych, Respiratory, Cardiac, GI, , Musculoskeletal, Endocrine, Dermatologic was negative except for pertinent findings as listed ABOVE and below:  pertinent positive as above, rest is good       Exam:Comprehensive exam done. /76 (BP Location: Left arm, Patient Position: Sitting)   Pulse 85   Ht 5' 5" (1.651 m)   Wt 82.1 kg (181 lb)   SpO2 95%   BMI 30.12 kg/m²   Exam included Vitals as listed, and patient's appearance and affect and alertness and mood, oral exam for yeast and hygiene and pharynx lesions and Mallapatti (M) score, neck with inspection for jvd and masses and thyroid abnormalities and lymph nodes (supraclavicular and infraclavicular nodes and axillary also examined and noted if abn), chest exam included symmetry and effort and fremitus and percussion and auscultation, cardiac exam included rhythm and gallops and murmur and rubs and jvd and edema, abdominal exam for mass and hepatosplenomegaly and tenderness and hernias and bowel sounds, " Musculoskeletal exam with muscle tone and posture and mobility/gait and  strength, and skin for rashes and cyanosis and pallor and turgor, extremity for clubbing.  Findings were normal except for pertinent findings listed below:  M2, right diaphragm not moving pecussion and rul paradox til late inspiration.    Radiographs (ct chest and cxr) reviewed: view by direct vision  2012 cxr [facility] with high right diaphrargm, hh.      CONCLUSIONS     1 - No wall motion abnormalities.     2 - Normal left ventricular systolic function (EF 55-60%).     3 - The estimated PA systolic pressure is greater than 23 mmHg.     4 - Trivial mitral regurgitation.     5 - Mild tricuspid regurgitation.     6 - Intertatrial septum is hypermobile.             This document has been electronically    SIGNED BY: Ochoa Santoro MD On: 01/05/2018 11:00  Labs reviewed      PFT was not done   CONCLUSIONS     1 - No wall motion abnormalities.     2 - Normal left ventricular systolic function (EF 55-60%).     3 - The estimated PA systolic pressure is greater than 23 mmHg.     4 - Trivial mitral regurgitation.     5 - Mild tricuspid regurgitation.     6 - Intertatrial septum is hypermobile.             This document has been electronically    SIGNED BY: Ochoa Santoro MD On: 01/05/2018 11:00    Plan:  Clinical impression is apparently straight forward and impression with management as below.    Michael was seen today for dyspnea on exertion and abnormal chest x-ray.    Diagnoses and all orders for this visit:    Diaphragm paralysis    Diaphragmatic hernia without obstruction and without gangrene        Follow-up if symptoms worsen or fail to improve.    Discussed with patient above for education the following:      Patient Instructions   hiatel hernia present and sl large, may get big with age, hard to recommend repair.    Right diaphragm high and right diaphragm paralyzed on recent radiographs-- chest xray 10/2012 did not have high diaphragm by  report but looked high to our direct review of xray?  Laying down makes will collapse right lung more.      Could check lung capacity laying and standing to determine how bad impairment is now.  Diaphragm can recover, if new, within a yr in most??????    May have had diaphragm problem as youth.       Elevation of head/chest would minimize effect of paralyzed diaphragm.     Bending over, tight pants, submerged in water - may worsen breathing.    You are no longer having breathing problems - no short breath.

## 2018-02-05 ENCOUNTER — LAB VISIT (OUTPATIENT)
Dept: LAB | Facility: HOSPITAL | Age: 73
End: 2018-02-05
Attending: INTERNAL MEDICINE
Payer: MEDICARE

## 2018-02-05 DIAGNOSIS — R60.0 BILATERAL LEG EDEMA: ICD-10-CM

## 2018-02-05 DIAGNOSIS — E78.5 HYPERLIPIDEMIA, UNSPECIFIED HYPERLIPIDEMIA TYPE: ICD-10-CM

## 2018-02-05 DIAGNOSIS — I10 ESSENTIAL HYPERTENSION: ICD-10-CM

## 2018-02-05 LAB
ALBUMIN SERPL BCP-MCNC: 3.4 G/DL
ALP SERPL-CCNC: 64 U/L
ALT SERPL W/O P-5'-P-CCNC: 47 U/L
ANION GAP SERPL CALC-SCNC: 6 MMOL/L
AST SERPL-CCNC: 45 U/L
BILIRUB SERPL-MCNC: 1.5 MG/DL
BUN SERPL-MCNC: 16 MG/DL
CALCIUM SERPL-MCNC: 9.3 MG/DL
CHLORIDE SERPL-SCNC: 103 MMOL/L
CHOLEST SERPL-MCNC: 136 MG/DL
CHOLEST/HDLC SERPL: 3.1 {RATIO}
CO2 SERPL-SCNC: 31 MMOL/L
CREAT SERPL-MCNC: 0.8 MG/DL
EST. GFR  (AFRICAN AMERICAN): >60 ML/MIN/1.73 M^2
EST. GFR  (NON AFRICAN AMERICAN): >60 ML/MIN/1.73 M^2
GLUCOSE SERPL-MCNC: 86 MG/DL
HDLC SERPL-MCNC: 44 MG/DL
HDLC SERPL: 32.4 %
LDLC SERPL CALC-MCNC: 75.4 MG/DL
NONHDLC SERPL-MCNC: 92 MG/DL
POTASSIUM SERPL-SCNC: 4.1 MMOL/L
PROT SERPL-MCNC: 6.3 G/DL
SODIUM SERPL-SCNC: 140 MMOL/L
TRIGL SERPL-MCNC: 83 MG/DL

## 2018-02-05 PROCEDURE — 80053 COMPREHEN METABOLIC PANEL: CPT

## 2018-02-05 PROCEDURE — 80061 LIPID PANEL: CPT

## 2018-02-05 PROCEDURE — 36415 COLL VENOUS BLD VENIPUNCTURE: CPT | Mod: PO

## 2018-02-06 ENCOUNTER — OFFICE VISIT (OUTPATIENT)
Dept: CARDIOLOGY | Facility: CLINIC | Age: 73
End: 2018-02-06
Payer: MEDICARE

## 2018-02-06 VITALS
OXYGEN SATURATION: 94 % | WEIGHT: 182.75 LBS | DIASTOLIC BLOOD PRESSURE: 78 MMHG | BODY MASS INDEX: 30.45 KG/M2 | HEIGHT: 65 IN | HEART RATE: 70 BPM | SYSTOLIC BLOOD PRESSURE: 117 MMHG

## 2018-02-06 DIAGNOSIS — Z82.49 FAMILY HISTORY OF ASCVD: ICD-10-CM

## 2018-02-06 DIAGNOSIS — I10 ESSENTIAL HYPERTENSION: Primary | ICD-10-CM

## 2018-02-06 PROCEDURE — 99213 OFFICE O/P EST LOW 20 MIN: CPT | Mod: S$PBB,,, | Performed by: INTERNAL MEDICINE

## 2018-02-06 PROCEDURE — 1159F MED LIST DOCD IN RCRD: CPT | Mod: ,,, | Performed by: INTERNAL MEDICINE

## 2018-02-06 PROCEDURE — 99999 PR PBB SHADOW E&M-EST. PATIENT-LVL III: CPT | Mod: PBBFAC,,, | Performed by: INTERNAL MEDICINE

## 2018-02-06 PROCEDURE — 99213 OFFICE O/P EST LOW 20 MIN: CPT | Mod: PBBFAC,PO | Performed by: INTERNAL MEDICINE

## 2018-02-06 PROCEDURE — 1126F AMNT PAIN NOTED NONE PRSNT: CPT | Mod: ,,, | Performed by: INTERNAL MEDICINE

## 2018-02-06 NOTE — PROGRESS NOTES
[facility]    CC: Establish care  Chief Complaint   Patient presents with    Results       Patient ID: Michael Ann Jr. is a 72 y.o. male with a past medical history of HTN, prostate cancer s/p excision in 2015  HPI  He is been doing well.  Denies any chest pain shortness of breath, orthopnea, PND, syncope or presyncope    Past Medical History:   Diagnosis Date    Hypertension      Past Surgical History:   Procedure Laterality Date    HAND SURGERY      HERNIA REPAIR      NECK SURGERY      PROSTATECTOMY       Social History     Social History    Marital status:      Spouse name: N/A    Number of children: N/A    Years of education: N/A     Occupational History    Not on file.     Social History Main Topics    Smoking status: Never Smoker    Smokeless tobacco: Never Used    Alcohol use No    Drug use: No    Sexual activity: Not on file     Other Topics Concern    Not on file     Social History Narrative    No narrative on file     No family history on file.    Review of patient's allergies indicates:  No Known Allergies    Medication List with Changes/Refills   Current Medications    AMLODIPINE (NORVASC) 2.5 MG TABLET    TAKE ONE TABLET BY MOUTH ONCE DAILY.    ASPIRIN 325 MG TABLET    Take 325 mg by mouth once daily.    ATORVASTATIN (LIPITOR) 40 MG TABLET    Take 1 tablet (40 mg total) by mouth once daily.    GABAPENTIN (NEURONTIN) 100 MG CAPSULE    1 by mouth in the morning 1 by mouth in the afternoon and 3 by mouth at bedtime    LORATADINE (CLARITIN ORAL)    Take by mouth once daily.    OMEPRAZOLE (PRILOSEC) 20 MG CAPSULE    Take 1 capsule (20 mg total) by mouth once daily.    TRIAMTERENE-HYDROCHLOROTHIAZIDE 37.5-25 MG (MAXZIDE-25) 37.5-25 MG PER TABLET    Take 1 tablet by mouth once daily.       Review of Systems  Constitution: Denies chills, fever, and sweats.  HENT: Denies headaches or blurry vision.  Cardiovascular: Denies chest pain or irregular heart  "beat.  Respiratory: Denies cough or shortness of breath.  Gastrointestinal: Denies abdominal pain, nausea, or vomiting.  Musculoskeletal: Denies muscle cramps.  Neurological: Denies dizziness or focal weakness.  Psychiatric/Behavioral: Normal mental status.  Hematologic/Lymphatic: Denies bleeding problem or easy bruising/bleeding.  Skin: Denies rash or suspicious lesions    Physical Examination  /78 (BP Location: Left arm)   Pulse 70   Ht 5' 5" (1.651 m)   Wt 82.9 kg (182 lb 12.2 oz)   SpO2 (!) 94%   BMI 30.41 kg/m²     Constitutional: No acute distress, conversant  HEENT: Sclera anicteric, Pupils equal, round and reactive to light, extraocular motions intact, Oropharynx clear  Neck: No JVD, no carotid bruits  Cardiovascular: regular rate and rhythm, no murmur, rubs or gallops, normal S1/S2  Pulmonary: Clear to auscultation bilaterally  Abdominal: Abdomen soft, nontender, nondistended, positive bowel sounds  Extremities: No lower extremity edema,   Pulses:  Carotid pulses are 2+ on the right side, and 2+ on the left side.  Radial pulses are 2+ on the right side, and 2+ on the left side.     Skin: No ecchymosis, erythema, or ulcers  Psych: Alert and oriented x 3, appropriate affect  Neuro: CNII-XII intact, no focal deficits    Labs:  Most Recent Data  CBC:   Lab Results   Component Value Date    WBC 8.00 08/15/2017    HGB 15.8 08/15/2017    HCT 46.5 08/15/2017     08/15/2017    MCV 93 08/15/2017    RDW 14.5 08/15/2017     BMP:   Lab Results   Component Value Date     02/05/2018    K 4.1 02/05/2018     02/05/2018    CO2 31 (H) 02/05/2018    BUN 16 02/05/2018    CREATININE 0.8 02/05/2018    GLU 86 02/05/2018    CALCIUM 9.3 02/05/2018     LFTS;   Lab Results   Component Value Date    PROT 6.3 02/05/2018    ALBUMIN 3.4 (L) 02/05/2018    BILITOT 1.5 (H) 02/05/2018    AST 45 (H) 02/05/2018    ALKPHOS 64 02/05/2018    ALT 47 (H) 02/05/2018     COAGS: No results found for: INR, PROTIME, " PTT  FLP:   Lab Results   Component Value Date    CHOL 136 02/05/2018    HDL 44 02/05/2018    LDLCALC 75.4 02/05/2018    TRIG 83 02/05/2018    CHOLHDL 32.4 02/05/2018     CARDIAC:   Lab Results   Component Value Date    BNP 23 08/15/2017       Imaging:        Echo:  CONCLUSIONS     1 - No wall motion abnormalities.     2 - Normal left ventricular systolic function (EF 55-60%).     3 - The estimated PA systolic pressure is greater than 23 mmHg.     4 - Trivial mitral regurgitation.     5 - Mild tricuspid regurgitation.     6 - Intertatrial septum is hypermobile.     Stress Testing:  CONCLUSIONS: NORMAL MYOCARDIAL PERFUSION PET STRESS TEST  1. The perfusion scan is free of evidence for myocardial ischemia or injury.   2. Resting wall motion is physiologic. Stress wall motion is physiologic.   3. Visually estimated LV function is normal at rest and normal at stress.   4. The ventricular volumes are normal at rest and stress.   5. The extracardiac distribution of radioactivity is normal.   6. There was no previous study available to compare.  I have personally reviewed these images and echo data    Assessment/Plan:  Michael was seen today for results.    Diagnoses and all orders for this visit:    Essential hypertension    Family history of ASCVD        Continue current medication.  I went over the results of the PET scan and echo with him.  Recommendations regarding healthy lifestyle, diet and exercise given to the patient      Follow-up in about 8 months (around 10/6/2018).          Total duration of face to face visit time 30 minutes.  Total time spent counseling greater than fifty percent of total visit time.  Counseling included discussion regarding imaging findings, diagnosis, possibilities, treatment options, risks and benefits.  The patient had many questions regarding the options and long-term effect which were all answered to my best ability.      Ochoa Santoro MD,MRCP,RPVI,FACC,FSCAI.  Interventional  Cardiology   Phone [phone]

## 2018-02-19 ENCOUNTER — DOCUMENTATION ONLY (OUTPATIENT)
Dept: FAMILY MEDICINE | Facility: CLINIC | Age: 73
End: 2018-02-19

## 2018-02-19 NOTE — PROGRESS NOTES
Health Maintenance Due   Topic Date Due    Colonoscopy  03/30/1997    Zoster Vaccine  03/30/2007    Pneumococcal (65+) (2 of 2 - PPSV23) 02/14/2018

## 2018-02-20 ENCOUNTER — OFFICE VISIT (OUTPATIENT)
Dept: FAMILY MEDICINE | Facility: CLINIC | Age: 73
End: 2018-02-20
Payer: MEDICARE

## 2018-02-20 VITALS
SYSTOLIC BLOOD PRESSURE: 130 MMHG | HEIGHT: 65 IN | DIASTOLIC BLOOD PRESSURE: 82 MMHG | TEMPERATURE: 98 F | BODY MASS INDEX: 30.05 KG/M2 | OXYGEN SATURATION: 97 % | HEART RATE: 76 BPM | WEIGHT: 180.38 LBS | RESPIRATION RATE: 16 BRPM

## 2018-02-20 DIAGNOSIS — N39.3 STRESS INCONTINENCE OF URINE: ICD-10-CM

## 2018-02-20 DIAGNOSIS — R79.89 LFT ELEVATION: ICD-10-CM

## 2018-02-20 DIAGNOSIS — I10 ESSENTIAL HYPERTENSION: Primary | ICD-10-CM

## 2018-02-20 DIAGNOSIS — Z23 NEED FOR 23-POLYVALENT PNEUMOCOCCAL POLYSACCHARIDE VACCINE: ICD-10-CM

## 2018-02-20 DIAGNOSIS — E78.5 HYPERLIPIDEMIA, UNSPECIFIED HYPERLIPIDEMIA TYPE: ICD-10-CM

## 2018-02-20 DIAGNOSIS — N52.34 ERECTILE DYSFUNCTION FOLLOWING SIMPLE PROSTATECTOMY: ICD-10-CM

## 2018-02-20 PROBLEM — R06.09 DOE (DYSPNEA ON EXERTION): Status: RESOLVED | Noted: 2017-10-31 | Resolved: 2018-02-20

## 2018-02-20 PROCEDURE — G0009 ADMIN PNEUMOCOCCAL VACCINE: HCPCS | Mod: S$GLB,,, | Performed by: INTERNAL MEDICINE

## 2018-02-20 PROCEDURE — 90732 PPSV23 VACC 2 YRS+ SUBQ/IM: CPT | Mod: S$GLB,,, | Performed by: INTERNAL MEDICINE

## 2018-02-20 PROCEDURE — 99214 OFFICE O/P EST MOD 30 MIN: CPT | Mod: S$GLB,,, | Performed by: INTERNAL MEDICINE

## 2018-02-20 PROCEDURE — 1126F AMNT PAIN NOTED NONE PRSNT: CPT | Mod: S$GLB,,, | Performed by: INTERNAL MEDICINE

## 2018-02-20 PROCEDURE — 1159F MED LIST DOCD IN RCRD: CPT | Mod: S$GLB,,, | Performed by: INTERNAL MEDICINE

## 2018-02-20 NOTE — PATIENT INSTRUCTIONS
kegel exercises    Lose 5 pounds.  Suggest [facility]    Low-Salt Diet  This diet removes foods that are high in salt. It also limits the amount of salt you use when cooking. It is most often used for people with high blood pressure, edema (fluid retention), and kidney, liver, or heart disease.  Table salt contains the mineral sodium. Your body needs sodium to work normally. But too much sodium can make your health problems worse. Your healthcare provider is recommending a low-salt (also called low-sodium) diet for you. Your total daily allowance of salt is 1,500 to 2,300 milligrams (mg). It is less than 1 teaspoon of table salt. This means you can have only about 500 to 700 mg of sodium at each meal. People with certain health problems should limit salt intake to the lower end of the recommended range.    When you cook, dont add much salt. If you can cook without using salt, even better. Dont add salt to your food at the table.  When shopping, read food labels. Salt is often called sodium on the label. Choose foods that are salt-free, low salt, or very low salt. Note that foods with reduced salt may not lower your salt intake enough.    Beans, potatoes, and pasta  Ok: Dry beans, split peas, lentils, potatoes, rice, macaroni, pasta, spaghetti without added salt  Avoid: Potato chips, tortilla chips, and similar products  Breads and cereals  Ok: Low-sodium breads, rolls, cereals, and cakes; low-salt crackers, matzo crackers  Avoid: Salted crackers, pretzels, popcorn, Afghan toast, pancakes, muffins  Dairy  Ok: Milk, chocolate milk, hot chocolate mix, low-salt cheeses, and yogurt  Avoid: Processed cheese and cheese spreads; Roquefort, Camembert, and cottage cheese; buttermilk, instant breakfast drink  Desserts  Ok: Ice cream, frozen yogurt, juice bars, gelatin, cookies and pies, sugar, honey, jelly, hard candy  Avoid: Most pies, cakes and cookies prepared or processed with salt; instant pudding  Drinks  Ok:  Tea, coffee, fizzy (carbonated) drinks, juices  Avoid: Flavored coffees, electrolyte replacement drinks, sports drinks  Meats  Ok: All fresh meat, fish, poultry, low-salt tuna, eggs, egg substitute  Avoid: Smoked, pickled, brine-cured, or salted meats and fish. This includes barraza, chipped beef, corned beef, hot dogs, deli meats, ham, kosher meats, salt pork, sausage, canned tuna, salted codfish, smoked salmon, herring, sardines, or anchovies.  Seasonings and spices  Ok: Most seasonings are okay. Good substitutes for salt include: fresh herb blends, hot sauce, lemon, garlic, barton, vinegar, dry mustard, parsley, cilantro, horseradish, tomato paste, regular margarine, mayonnaise, unsalted butter, cream cheese, vegetable oil, cream, low-salt salad dressing and gravy.  Avoid: Regular ketchup, relishes, pickles, soy sauce, teriyaki sauce, Worcestershire sauce, BBQ sauce, tartar sauce, meat tenderizer, chili sauce, regular gravy, regular salad dressing, salted butter  Soups  Ok: Low-salt soups and broths made with allowed foods  Avoid: Bouillon cubes, soups with smoked or salted meats, regular soup and broth  Vegetables  Ok: Most vegetables are okay; also low-salt tomato and vegetable juices  Avoid: Sauerkraut and other brine-soaked vegetables; pickles and other pickled vegetables; tomato juice, olives  © 2554-8341 [employer]. [address] [place] [address], [place] [address]. All rights reserved. This information is not intended as a substitute for professional medical care. Always follow your healthcare professional's instructions.

## 2018-02-20 NOTE — PROGRESS NOTES
Subjective:       Patient ID: Michael nAn Jr. is a 72 y.o. male.    Chief Complaint: Hypertension (labs)    HPI   When he was heavy objects he is incontinent after prostatectomy.      CHIEF COMPLAINT: Hyperlipidemia. cholesterol screening: no.   HPI:     ONSET:    MODIFIERS/TREATMENTS: . Taking medications: yes. . Non-compliance with following diet: no. .     SYMPTOMS/RELATED:Possible medication side effects include:   Myalgia: no.  .     REVIEW OF SYMPTOMS: past weights:   Wt Readings from Last 1 Encounters:   02/20/18 0802 81.8 kg (180 lb 6.4 oz)                                                     Last lipids: total   Lab Results   Component Value Date    CHOL 136 02/05/2018    CHOL 180 08/08/2017    CHOL 146 02/07/2017                                                                     HDL   Lab Results   Component Value Date    HDL 44 02/05/2018    HDL 51 08/08/2017    HDL 52 02/07/2017                                                                     LDL   Lab Results   Component Value Date    LDLCALC 75.4 02/05/2018    LDLCALC 116.4 08/08/2017    LDLCALC 81.8 02/07/2017                                                                     TRIG   Lab Results   Component Value Date    TRIG 83 02/05/2018    TRIG 63 08/08/2017    TRIG 61 02/07/2017                                                                           CHIEF COMPLAINT: Hypertension  HPI:     ONSET:      QUALITY/COURSE:   Unchanged.     INTENSITY/SEVERITY:  Average blood pressure is 130/80 .     MODIFIERS/TREATMENTS:  Taking medications: yes. .High sodium intake: no. alcohol: no      The following symptoms are positive only if BOLDED, otherwise are negative.      SYMPTOMS/RELATED: Possible medication side effects include:   Depression..  . Cough. . Constipation.    REVIEW OF SYMPTOMS: . Weight_loss . Weight_gain . Leg_cramps .Potency_problems .    TARGET ORGAN DAMAGE:: angina/ prior myocardial infarction, chronic kidney disease, heart  "failure, left ventricular hypertrophy, peripheral artery disease, prior coronary revascularization, retinopathy, stroke. transient ischemic attack.    Urinary incontinence after prostate surgery  Review of Systems   Eyes: Negative for visual disturbance.   Respiratory: Negative for chest tightness and shortness of breath.    Cardiovascular: Negative for chest pain and leg swelling.   Neurological: Negative for dizziness, syncope, weakness and headaches.   Psychiatric/Behavioral: Negative for dysphoric mood. The patient is not nervous/anxious.        Objective:      Vitals:    02/20/18 0802   BP: 130/82   Pulse: 76   Resp: 16   Temp: 97.7 °F (36.5 °C)   TempSrc: Oral   SpO2: 97%   Weight: 81.8 kg (180 lb 6.4 oz)   Height: 5' 5" (1.651 m)   PainSc: 0-No pain     Physical Exam   Constitutional: He appears well-developed and well-nourished.   Eyes: Pupils are equal, round, and reactive to light.   Cardiovascular: Normal rate, regular rhythm and normal heart sounds.    Pulmonary/Chest: Effort normal and breath sounds normal.   Abdominal: Soft. There is no tenderness.   Neurological: He is alert.   Psychiatric: He has a normal mood and affect. His behavior is normal. Thought content normal.   Nursing note and vitals reviewed.      psa undetectable   Assessment:       No diagnosis found.      Plan:     There are no diagnoses linked to this encounter.  No Follow-up on file.      "

## 2018-05-15 ENCOUNTER — LAB VISIT (OUTPATIENT)
Dept: LAB | Facility: HOSPITAL | Age: 73
End: 2018-05-15
Attending: INTERNAL MEDICINE
Payer: MEDICARE

## 2018-05-15 DIAGNOSIS — E78.5 HYPERLIPIDEMIA, UNSPECIFIED HYPERLIPIDEMIA TYPE: ICD-10-CM

## 2018-05-15 DIAGNOSIS — I10 ESSENTIAL HYPERTENSION: ICD-10-CM

## 2018-05-15 LAB
ALBUMIN SERPL BCP-MCNC: 3.6 G/DL
ALP SERPL-CCNC: 58 U/L
ALT SERPL W/O P-5'-P-CCNC: 44 U/L
ANION GAP SERPL CALC-SCNC: 8 MMOL/L
AST SERPL-CCNC: 37 U/L
BILIRUB SERPL-MCNC: 1.4 MG/DL
BUN SERPL-MCNC: 21 MG/DL
CALCIUM SERPL-MCNC: 9.1 MG/DL
CHLORIDE SERPL-SCNC: 102 MMOL/L
CHOLEST SERPL-MCNC: 148 MG/DL
CHOLEST/HDLC SERPL: 3.4 {RATIO}
CO2 SERPL-SCNC: 30 MMOL/L
CREAT SERPL-MCNC: 0.8 MG/DL
EST. GFR  (AFRICAN AMERICAN): >60 ML/MIN/1.73 M^2
EST. GFR  (NON AFRICAN AMERICAN): >60 ML/MIN/1.73 M^2
GLUCOSE SERPL-MCNC: 91 MG/DL
HDLC SERPL-MCNC: 44 MG/DL
HDLC SERPL: 29.7 %
LDLC SERPL CALC-MCNC: 87.8 MG/DL
NONHDLC SERPL-MCNC: 104 MG/DL
POTASSIUM SERPL-SCNC: 3.6 MMOL/L
PROT SERPL-MCNC: 6.5 G/DL
SODIUM SERPL-SCNC: 140 MMOL/L
TRIGL SERPL-MCNC: 81 MG/DL

## 2018-05-15 PROCEDURE — 36415 COLL VENOUS BLD VENIPUNCTURE: CPT | Mod: PO

## 2018-05-15 PROCEDURE — 80061 LIPID PANEL: CPT

## 2018-05-15 PROCEDURE — 80053 COMPREHEN METABOLIC PANEL: CPT

## 2018-05-21 ENCOUNTER — DOCUMENTATION ONLY (OUTPATIENT)
Dept: FAMILY MEDICINE | Facility: CLINIC | Age: 73
End: 2018-05-21

## 2018-05-22 ENCOUNTER — OFFICE VISIT (OUTPATIENT)
Dept: FAMILY MEDICINE | Facility: CLINIC | Age: 73
End: 2018-05-22
Payer: MEDICARE

## 2018-05-22 VITALS
WEIGHT: 179 LBS | OXYGEN SATURATION: 97 % | RESPIRATION RATE: 16 BRPM | TEMPERATURE: 98 F | HEIGHT: 65 IN | HEART RATE: 74 BPM | SYSTOLIC BLOOD PRESSURE: 134 MMHG | BODY MASS INDEX: 29.82 KG/M2 | DIASTOLIC BLOOD PRESSURE: 78 MMHG

## 2018-05-22 DIAGNOSIS — G89.29 CHRONIC NECK PAIN: ICD-10-CM

## 2018-05-22 DIAGNOSIS — D04.9 SQUAMOUS CELL CARCINOMA IN SITU (SCCIS) OF SKIN: ICD-10-CM

## 2018-05-22 DIAGNOSIS — M54.2 CHRONIC NECK PAIN: ICD-10-CM

## 2018-05-22 DIAGNOSIS — I10 ESSENTIAL HYPERTENSION: Primary | ICD-10-CM

## 2018-05-22 DIAGNOSIS — B35.4 TINEA CORPORIS: ICD-10-CM

## 2018-05-22 DIAGNOSIS — E78.5 HYPERLIPIDEMIA, UNSPECIFIED HYPERLIPIDEMIA TYPE: ICD-10-CM

## 2018-05-22 PROCEDURE — 99214 OFFICE O/P EST MOD 30 MIN: CPT | Mod: S$GLB,,, | Performed by: INTERNAL MEDICINE

## 2018-05-22 NOTE — PROGRESS NOTES
Subjective:       Patient ID: Michael Ann Jr. is a 73 y.o. male.    Chief Complaint: Hypertension (labs,needs referral to dermatologist Dr Guzman)    HPI         CHIEF COMPLAINT: Neck Pain(+).  HPI: History of fusion in 2 levels    ONSET/TIMING: Trauma: no. . Onset   1 year     ago. . Work related: no .    Similar problems  in past: no .    DURATION:  Intermittent for days    QUALITY/COURSE:    Worsening.       LOCATION:   Right . Radiation: no.     INTENSITY/SEVERITY: Severity is # 6 (10 point scale).      SYMPTOMS/RELATED: .-Possible medication side effects include:     The following symptoms are positive if BOLD, negative otherwise.      CONTEXT/WHEN: --Activity. Coughing. Sneeze.. Working_[employer].  . Repetitive_work . Hx of CA. history of IV drug abuse.. Similar_problems.     MODIFIERS/TREATMENTS: . Taking medications.    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 c-spine_x-rays. CT. MRI. Surgery.     REVIEW OF SYMPTOMS:  . Arm_Pain_to_below _elbow . .Weight_loss.              CHIEF COMPLAINT: Rash  HPI:     ONSET/TIMING: Onset   6 months        ago. Sudden: no.. Work related: no. Similar_problems_in_the_past: no.    DURATION:  Continuous..    QUALITY/COURSE:   Slowly worsening.     LOCATION:    Left forearm, left shoulder .     INTENSITY/SEVERITY:  Severity is #   5    (10 point scale).    CONTEXT/WHEN: .--Similar problems: no . .  Past treatments: none  . Exposure_to_others_with_similar_symptoms: no . . Exposure_to_poison _ivy: no. .   New exposures (soaps, lotions, laundry detergents, foods, medications, plants, insects or animals). no    SYMPTOMS/RELATED: .--Possible medication side effect:    The following symptoms are positive if BOLD, negative otherwise.     REVIEW OF SYMPTOMS:  Itching.  Pain. Sharp_pain. Dull_pain. Burning_pain.  Erythema-Skin. Hypopigmentation.  hyperpigmentation . Inflammation. Herald_Patch.. fixed . evanescent.  Blisters. Purulence. Fever. Fatigue. Tick_Bites.       Having  trouble with his gait for months, thinks it might be the gabapentin.          CHIEF COMPLAINT: Hyperlipidemia. cholesterol screening: no.   HPI:     ONSET:    MODIFIERS/TREATMENTS: . Taking medications: yes. . Non-compliance with following diet: no. .     SYMPTOMS/RELATED:Possible medication side effects include:   Myalgia: no.  .     REVIEW OF SYMPTOMS: past weights:   Wt Readings from Last 1 Encounters:   05/22/18 0824 81.2 kg (179 lb 0.2 oz)                                                     Last lipids: total   Lab Results   Component Value Date    CHOL 148 05/15/2018    CHOL 136 02/05/2018    CHOL 180 08/08/2017                                                                     HDL   Lab Results   Component Value Date    HDL 44 05/15/2018    HDL 44 02/05/2018    HDL 51 08/08/2017                                                                     LDL   Lab Results   Component Value Date    LDLCALC 87.8 05/15/2018    LDLCALC 75.4 02/05/2018    LDLCALC 116.4 08/08/2017                                                                     TRIG   Lab Results   Component Value Date    TRIG 81 05/15/2018    TRIG 83 02/05/2018    TRIG 63 08/08/2017                                                                           CHIEF COMPLAINT: Hypertension  HPI:     ONSET:      QUALITY/COURSE:   Unchanged.     INTENSITY/SEVERITY:  Average blood pressure is ? .     MODIFIERS/TREATMENTS:  Taking medications: yes. .High sodium intake: yes. alcohol: no      The following symptoms are positive only if BOLDED, otherwise are negative.      SYMPTOMS/RELATED: Possible medication side effects include:   Depression..  . Cough. . Constipation.    REVIEW OF SYMPTOMS: . Weight_loss . Weight_gain . Leg_cramps .Potency_problems .    TARGET ORGAN DAMAGE:: angina/ prior myocardial infarction, chronic kidney disease, heart failure, left ventricular hypertrophy, peripheral artery disease, prior coronary revascularization, retinopathy, stroke.  "transient ischemic attack.      Review of Systems   Eyes: Negative for visual disturbance.   Respiratory: Negative for chest tightness and shortness of breath.    Cardiovascular: Negative for chest pain and leg swelling.   Neurological: Negative for dizziness, syncope, weakness and headaches.   Psychiatric/Behavioral: Negative for dysphoric mood. The patient is not nervous/anxious.        Objective:      Vitals:    05/22/18 0824   BP: 134/78   Pulse: 74   Resp: 16   Temp: 97.5 °F (36.4 °C)   TempSrc: Oral   SpO2: 97%   Weight: 81.2 kg (179 lb 0.2 oz)   Height: 5' 5" (1.651 m)   PainSc:   5   PainLoc: Neck     Physical Exam   Constitutional: He appears well-developed and well-nourished.   Neck:   Tender right upper trapezius.  Decreased range of motion to the right.   Cardiovascular: Normal rate, regular rhythm and normal heart sounds.    Pulmonary/Chest: Effort normal and breath sounds normal.   Abdominal: Soft. There is no tenderness.   Neurological: He is alert.   Skin:        Psychiatric: He has a normal mood and affect. His behavior is normal. Thought content normal.   Nursing note and vitals reviewed.        Assessment:       1. Essential hypertension    2. Hyperlipidemia, unspecified hyperlipidemia type    3. Tinea corporis    4. Squamous cell carcinoma in situ (SCCIS) of skin    5. Chronic neck pain          Plan:     Essential hypertension  -     Comprehensive metabolic panel; Future; Expected date: 05/22/2018    Hyperlipidemia, unspecified hyperlipidemia type  -     Lipid panel; Future; Expected date: 05/22/2018    Tinea corporis  -     Ambulatory consult to Dermatology    Squamous cell carcinoma in situ (SCCIS) of skin    Chronic neck pain  -     X-Ray Cervical Spine AP And Lateral; Future; Expected date: 05/22/2018      Follow-up in about 3 months (around 8/22/2018) for if you are not better return in one month.      "

## 2018-05-22 NOTE — PATIENT INSTRUCTIONS
NECK PAIN EXERCISES:  Walk for 5 mins.  The 'clean the window' in a 7 or reverse 7 pattern till hit  Hurts, then do 20 more.  Next use elastic band around a table leg to pull backward, again do it till it hurts then 20 more.   Then stretch the neck gently. Finally, apply a cold pack to neck.  Do this daily till pain is gone.     Get a back buddy Tee    Stop gabapentin    Lamisil Cream twice a day to the back for one month.

## 2018-06-12 ENCOUNTER — HOSPITAL ENCOUNTER (OUTPATIENT)
Dept: RADIOLOGY | Facility: CLINIC | Age: 73
Discharge: HOME | End: 2018-06-12
Attending: INTERNAL MEDICINE
Payer: MEDICARE

## 2018-06-12 DIAGNOSIS — G89.29 CHRONIC NECK PAIN: ICD-10-CM

## 2018-06-12 DIAGNOSIS — M54.2 CHRONIC NECK PAIN: ICD-10-CM

## 2018-06-12 PROCEDURE — 72040 X-RAY EXAM NECK SPINE 2-3 VW: CPT | Mod: TC,FY,PO

## 2018-06-12 PROCEDURE — 72040 X-RAY EXAM NECK SPINE 2-3 VW: CPT | Mod: 26,,, | Performed by: RADIOLOGY

## 2018-06-18 ENCOUNTER — TELEPHONE (OUTPATIENT)
Dept: FAMILY MEDICINE | Facility: CLINIC | Age: 73
End: 2018-06-18

## 2018-06-18 NOTE — TELEPHONE ENCOUNTER
----- Message from Inocencia Gutierrez sent at 6/18/2018  1:06 PM CDT -----  Contact: Self  Type:  Test Results  Returning Delores's call  Who Called: Patient  Name of Test (Lab/Mammo/Etc):  X-ray  Date of Test:  6/12  Ordering Provider:  Dr Baptiste  Where the test was performed:  [facility]  Best Call Back Number:  [phone]  Additional Information:  na

## 2018-08-14 ENCOUNTER — LAB VISIT (OUTPATIENT)
Dept: LAB | Facility: HOSPITAL | Age: 73
End: 2018-08-14
Attending: INTERNAL MEDICINE
Payer: MEDICARE

## 2018-08-14 DIAGNOSIS — E78.5 HYPERLIPIDEMIA, UNSPECIFIED HYPERLIPIDEMIA TYPE: ICD-10-CM

## 2018-08-14 DIAGNOSIS — I10 ESSENTIAL HYPERTENSION: ICD-10-CM

## 2018-08-14 LAB
ALBUMIN SERPL BCP-MCNC: 3.4 G/DL
ALP SERPL-CCNC: 56 U/L
ALT SERPL W/O P-5'-P-CCNC: 30 U/L
ANION GAP SERPL CALC-SCNC: 7 MMOL/L
AST SERPL-CCNC: 26 U/L
BILIRUB SERPL-MCNC: 1 MG/DL
BUN SERPL-MCNC: 22 MG/DL
CALCIUM SERPL-MCNC: 9 MG/DL
CHLORIDE SERPL-SCNC: 104 MMOL/L
CHOLEST SERPL-MCNC: 151 MG/DL
CHOLEST/HDLC SERPL: 3.6 {RATIO}
CO2 SERPL-SCNC: 30 MMOL/L
CREAT SERPL-MCNC: 0.8 MG/DL
EST. GFR  (AFRICAN AMERICAN): >60 ML/MIN/1.73 M^2
EST. GFR  (NON AFRICAN AMERICAN): >60 ML/MIN/1.73 M^2
GLUCOSE SERPL-MCNC: 92 MG/DL
HDLC SERPL-MCNC: 42 MG/DL
HDLC SERPL: 27.8 %
LDLC SERPL CALC-MCNC: 92.4 MG/DL
NONHDLC SERPL-MCNC: 109 MG/DL
POTASSIUM SERPL-SCNC: 3.9 MMOL/L
PROT SERPL-MCNC: 6.4 G/DL
SODIUM SERPL-SCNC: 141 MMOL/L
TRIGL SERPL-MCNC: 83 MG/DL

## 2018-08-14 PROCEDURE — 80053 COMPREHEN METABOLIC PANEL: CPT

## 2018-08-14 PROCEDURE — 80061 LIPID PANEL: CPT

## 2018-08-14 PROCEDURE — 36415 COLL VENOUS BLD VENIPUNCTURE: CPT | Mod: PO

## 2018-08-21 ENCOUNTER — OFFICE VISIT (OUTPATIENT)
Dept: FAMILY MEDICINE | Facility: CLINIC | Age: 73
End: 2018-08-21
Payer: MEDICARE

## 2018-08-21 VITALS
RESPIRATION RATE: 16 BRPM | SYSTOLIC BLOOD PRESSURE: 108 MMHG | DIASTOLIC BLOOD PRESSURE: 62 MMHG | HEIGHT: 65 IN | OXYGEN SATURATION: 95 % | BODY MASS INDEX: 30.56 KG/M2 | HEART RATE: 71 BPM | WEIGHT: 183.44 LBS | TEMPERATURE: 98 F

## 2018-08-21 DIAGNOSIS — I10 ESSENTIAL HYPERTENSION: Primary | ICD-10-CM

## 2018-08-21 DIAGNOSIS — E78.5 HYPERLIPIDEMIA, UNSPECIFIED HYPERLIPIDEMIA TYPE: ICD-10-CM

## 2018-08-21 DIAGNOSIS — Z12.11 COLON CANCER SCREENING: ICD-10-CM

## 2018-08-21 DIAGNOSIS — D49.2 SKIN TUMOR: ICD-10-CM

## 2018-08-21 PROBLEM — R79.89 LFT ELEVATION: Status: RESOLVED | Noted: 2018-02-20 | Resolved: 2018-08-21

## 2018-08-21 PROCEDURE — 99214 OFFICE O/P EST MOD 30 MIN: CPT | Mod: S$GLB,,, | Performed by: INTERNAL MEDICINE

## 2018-08-21 RX ORDER — GABAPENTIN 100 MG/1
CAPSULE ORAL 3 TIMES DAILY
COMMUNITY
Start: 2018-07-23 | End: 2018-11-20 | Stop reason: SDUPTHER

## 2018-08-21 NOTE — PATIENT INSTRUCTIONS
Low-Salt Diet  This diet removes foods that are high in salt. It also limits the amount of salt you use when cooking. It is most often used for people with high blood pressure, edema (fluid retention), and kidney, liver, or heart disease.  Table salt contains the mineral sodium. Your body needs sodium to work normally. But too much sodium can make your health problems worse. Your healthcare provider is recommending a low-salt (also called low-sodium) diet for you. Your total daily allowance of salt is 1,500 to 2,300 milligrams (mg). It is less than 1 teaspoon of table salt. This means you can have only about 500 to 700 mg of sodium at each meal. People with certain health problems should limit salt intake to the lower end of the recommended range.    When you cook, don´t add much salt. If you can cook without using salt, even better. Don´t add salt to your food at the table.  When shopping, read food labels. Salt is often called sodium on the label. Choose foods that are salt-free, low salt, or very low salt. Note that foods with reduced salt may not lower your salt intake enough.    Beans, potatoes, and pasta  Ok: Dry beans, split peas, lentils, potatoes, rice, macaroni, pasta, spaghetti without added salt  Avoid: Potato chips, tortilla chips, and similar products  Breads and cereals  Ok: Low-sodium breads, rolls, cereals, and cakes; low-salt crackers, matzo crackers  Avoid: Salted crackers, pretzels, popcorn, Khmer toast, pancakes, muffins  Dairy  Ok: Milk, chocolate milk, hot chocolate mix, low-salt cheeses, and yogurt  Avoid: Processed cheese and cheese spreads; Roquefort, Camembert, and cottage cheese; buttermilk, instant breakfast drink  Desserts  Ok: Ice cream, frozen yogurt, juice bars, gelatin, cookies and pies, sugar, honey, jelly, hard candy  Avoid: Most pies, cakes and cookies prepared or processed with salt; instant pudding  Drinks  Ok: Tea, coffee, fizzy (carbonated) drinks, juices  Avoid: Flavored  coffees, electrolyte replacement drinks, sports drinks  Meats  Ok: All fresh meat, fish, poultry, low-salt tuna, eggs, egg substitute  Avoid: Smoked, pickled, brine-cured, or salted meats and fish. This includes barraza, chipped beef, corned beef, hot dogs, deli meats, ham, kosher meats, salt pork, sausage, canned tuna, salted codfish, smoked salmon, herring, sardines, or anchovies.  Seasonings and spices  Ok: Most seasonings are okay. Good substitutes for salt include: fresh herb blends, hot sauce, lemon, garlic, barton, vinegar, dry mustard, parsley, cilantro, horseradish, tomato paste, regular margarine, mayonnaise, unsalted butter, cream cheese, vegetable oil, cream, low-salt salad dressing and gravy.  Avoid: Regular ketchup, relishes, pickles, soy sauce, teriyaki sauce, Worcestershire sauce, BBQ sauce, tartar sauce, meat tenderizer, chili sauce, regular gravy, regular salad dressing, salted butter  Soups  Ok: Low-salt soups and broths made with allowed foods  Avoid: Bouillon cubes, soups with smoked or salted meats, regular soup and broth  Vegetables  Ok: Most vegetables are okay; also low-salt tomato and vegetable juices  Avoid: Sauerkraut and other brine-soaked vegetables; pickles and other pickled vegetables; tomato juice, olives  © 8830-3565 [employer]. [address] [place] [address], [place] [address]. All rights reserved. This information is not intended as a substitute for professional medical care. Always follow your healthcare professional's instructions.

## 2018-08-21 NOTE — PROGRESS NOTES
Subjective:       Patient ID: Michael Ann Jr. is a 73 y.o. male.    Chief Complaint: Hypertension (labs)    HPI         CHIEF COMPLAINT: Rash  HPI:     ONSET/TIMING: Onset       2y      ago. Sudden: no.. Work related: no. Similar_problems_in_the_past: no.    DURATION:  Continuous..    QUALITY/COURSE:   unchanged  .     LOCATION:     . Left shoulder.     INTENSITY/SEVERITY:  Severity is #   2    (10 point scale).    CONTEXT/WHEN: .--Similar problems: no . .  Past treatments: none  . Exposure_to_others_with_similar_symptoms: no . . Exposure_to_poison _ivy: no. .   New exposures (soaps, lotions, laundry detergents, foods, medications, plants, insects or animals). no    SYMPTOMS/RELATED: .--Possible medication side effect:    The following symptoms are positive if BOLD, negative otherwise.     REVIEW OF SYMPTOMS:  Itching.  Pain Stings.  Sharp_pain. Dull_pain. Burning_pain.  Erythema-Skin. Hypopigmentation.  hyperpigmentation . Inflammation. Herald_Patch.. fixed . evanescent.  Blisters. Purulence. Fever. Fatigue. Tick_Bites.                   CHIEF COMPLAINT: Hypertension  HPI:     ONSET:      QUALITY/COURSE:   Unchanged.     INTENSITY/SEVERITY:  Average blood pressure is 110/80 .     MODIFIERS/TREATMENTS:  Taking medications: yes. .High sodium intake: no. alcohol: no      The following symptoms are positive only if BOLDED, otherwise are negative.      SYMPTOMS/RELATED: Possible medication side effects include:   Depression..  . Cough. . Constipation.    REVIEW OF SYMPTOMS: . Weight_loss . Weight_gain . Leg_cramps .Potency_problems .    TARGET ORGAN DAMAGE:: angina/ prior myocardial infarction, chronic kidney disease, heart failure, left ventricular hypertrophy, peripheral artery disease, prior coronary revascularization, retinopathy, stroke. transient ischemic attack.        CHIEF COMPLAINT: Hyperlipidemia. cholesterol screening: no.   HPI:     ONSET:    MODIFIERS/TREATMENTS: . Taking medications: yes. .  "Non-compliance with following diet: no. .     SYMPTOMS/RELATED:Possible medication side effects include:   Myalgia: no.  .     REVIEW OF SYMPTOMS: past weights:   Wt Readings from Last 1 Encounters:   08/21/18 0915 83.2 kg (183 lb 6.8 oz)                                                     Last lipids: total   Lab Results   Component Value Date    CHOL 151 08/14/2018    CHOL 148 05/15/2018    CHOL 136 02/05/2018                                                                     HDL   Lab Results   Component Value Date    HDL 42 08/14/2018    HDL 44 05/15/2018    HDL 44 02/05/2018                                                                     LDL   Lab Results   Component Value Date    LDLCALC 92.4 08/14/2018    LDLCALC 87.8 05/15/2018    LDLCALC 75.4 02/05/2018                                                                     TRIG   Lab Results   Component Value Date    TRIG 83 08/14/2018    TRIG 81 05/15/2018    TRIG 83 02/05/2018                                                                         Review of Systems   Eyes: Negative for visual disturbance.   Respiratory: Negative for chest tightness and shortness of breath.    Cardiovascular: Negative for chest pain and leg swelling.   Neurological: Negative for dizziness, syncope, weakness and headaches.   Psychiatric/Behavioral: Negative for dysphoric mood. The patient is not nervous/anxious.          Objective:      Vitals:    08/21/18 0915   BP: 108/62   Pulse: 71   Resp: 16   Temp: 97.8 °F (36.6 °C)   SpO2: 95%   Weight: 83.2 kg (183 lb 6.8 oz)   Height: 5' 5" (1.651 m)   PainSc: 0-No pain     Physical Exam   Constitutional: He appears well-developed and well-nourished.   Cardiovascular: Normal rate, regular rhythm and normal heart sounds.   Pulmonary/Chest: Effort normal and breath sounds normal.   Abdominal: Soft. There is no tenderness.   Neurological: He is alert.   Skin:        Psychiatric: He has a normal mood and affect. His behavior is " normal. Thought content normal.   Nursing note and vitals reviewed.        Assessment:       1. Essential hypertension    2. Hyperlipidemia, unspecified hyperlipidemia type    3. Skin tumor    4. Colon cancer screening          Plan:       Essential hypertension  -     CBC auto differential; Future; Expected date: 08/21/2018  -     Comprehensive metabolic panel; Future; Expected date: 08/21/2018    Hyperlipidemia, unspecified hyperlipidemia type  -     Lipid panel; Future; Expected date: 08/21/2018    Skin tumor  -     Ambulatory consult to Dermatology    Colon cancer screening  -     Case request GI: COLONOSCOPY      Follow-up in about 3 months (around 11/21/2018).

## 2018-09-06 ENCOUNTER — TELEPHONE (OUTPATIENT)
Dept: FAMILY MEDICINE | Facility: CLINIC | Age: 73
End: 2018-09-06

## 2018-09-06 NOTE — TELEPHONE ENCOUNTER
----- Message from Trish Vieira sent at 9/6/2018  3:21 PM CDT -----    Type: Needs Medical Advice    Who Called:  Wife- Michael Ann  Symptoms (please be specific):  Na  How long has patient had these symptoms:  Na  Pharmacy name and phone #:  [facility]  Best Call Back Number: [phone] (home)     Additional Information: Patient stated she received a call from an office that was scheduling wellness programs set up by Dr. Baptiste for his patients. For coordinating healthcare [facility]. Stating the call came from was a [phone] but the coordinating location regarding patients was in [place], number given was [phone]. Wanted to know if valid information or scam, please advise

## 2018-09-06 NOTE — TELEPHONE ENCOUNTER
----- Message from Trish Vieira sent at 9/6/2018  3:21 PM CDT -----    Type: Needs Medical Advice    Who Called:  Wife- Michael Ann  Symptoms (please be specific):  Na  How long has patient had these symptoms:  Na  Pharmacy name and phone #:  [facility]  Best Call Back Number: [phone] (home)     Additional Information: Patient stated she received a call from an office that was scheduling wellness programs set up by Dr. Baptiste for his patients. For coordinating healthcare [facility]. Stating the call came from was a [phone] but the coordinating location regarding patients was in [place], number given was [phone]. Wanted to know if valid information or scam, please advise

## 2018-09-07 DIAGNOSIS — I10 ESSENTIAL HYPERTENSION: ICD-10-CM

## 2018-09-07 RX ORDER — AMLODIPINE BESYLATE 2.5 MG/1
TABLET ORAL
Qty: 90 TABLET | Refills: 3 | Status: SHIPPED | OUTPATIENT
Start: 2018-09-07 | End: 2019-11-09 | Stop reason: SDUPTHER

## 2018-09-10 DIAGNOSIS — I10 HYPERTENSION, UNSPECIFIED TYPE: Primary | ICD-10-CM

## 2018-09-30 ENCOUNTER — EXTERNAL CHRONIC CARE MANAGEMENT (OUTPATIENT)
Dept: PRIMARY CARE CLINIC | Facility: CLINIC | Age: 73
End: 2018-09-30
Payer: MEDICARE

## 2018-09-30 PROCEDURE — 99490 CHRNC CARE MGMT STAFF 1ST 20: CPT | Mod: S$PBB,,, | Performed by: INTERNAL MEDICINE

## 2018-09-30 PROCEDURE — 99490 CHRNC CARE MGMT STAFF 1ST 20: CPT | Mod: PBBFAC | Performed by: INTERNAL MEDICINE

## 2018-10-03 DIAGNOSIS — G62.9 PERIPHERAL POLYNEUROPATHY: ICD-10-CM

## 2018-10-03 RX ORDER — GABAPENTIN 100 MG/1
CAPSULE ORAL
Qty: 150 CAPSULE | Refills: 11 | Status: SHIPPED | OUTPATIENT
Start: 2018-10-03 | End: 2019-10-22 | Stop reason: SDUPTHER

## 2018-10-09 ENCOUNTER — OFFICE VISIT (OUTPATIENT)
Dept: CARDIOLOGY | Facility: CLINIC | Age: 73
End: 2018-10-09
Payer: MEDICARE

## 2018-10-09 VITALS
RESPIRATION RATE: 18 BRPM | HEART RATE: 83 BPM | SYSTOLIC BLOOD PRESSURE: 134 MMHG | DIASTOLIC BLOOD PRESSURE: 86 MMHG | WEIGHT: 186.94 LBS | OXYGEN SATURATION: 95 % | BODY MASS INDEX: 29.34 KG/M2 | HEIGHT: 67 IN

## 2018-10-09 DIAGNOSIS — I10 HYPERTENSION, UNSPECIFIED TYPE: ICD-10-CM

## 2018-10-09 DIAGNOSIS — Z82.49 FAMILY HISTORY OF ASCVD: Primary | ICD-10-CM

## 2018-10-09 DIAGNOSIS — E78.5 HYPERLIPIDEMIA, UNSPECIFIED HYPERLIPIDEMIA TYPE: ICD-10-CM

## 2018-10-09 PROCEDURE — 99213 OFFICE O/P EST LOW 20 MIN: CPT | Mod: S$PBB,25,, | Performed by: INTERNAL MEDICINE

## 2018-10-09 PROCEDURE — 93010 ELECTROCARDIOGRAM REPORT: CPT | Mod: S$PBB,,, | Performed by: INTERNAL MEDICINE

## 2018-10-09 PROCEDURE — 99213 OFFICE O/P EST LOW 20 MIN: CPT | Mod: PBBFAC,PO,25 | Performed by: INTERNAL MEDICINE

## 2018-10-09 PROCEDURE — 93005 ELECTROCARDIOGRAM TRACING: CPT | Mod: PBBFAC,PO | Performed by: INTERNAL MEDICINE

## 2018-10-09 PROCEDURE — 99999 PR PBB SHADOW E&M-EST. PATIENT-LVL III: CPT | Mod: PBBFAC,,, | Performed by: INTERNAL MEDICINE

## 2018-10-09 RX ORDER — ASPIRIN 81 MG/1
81 TABLET ORAL DAILY
Qty: 90 TABLET | Refills: 3 | COMMUNITY
Start: 2018-10-09 | End: 2021-01-20

## 2018-10-09 NOTE — PROGRESS NOTES
[facility]    CC:  Follow-up appointment  Chief Complaint   Patient presents with    Follow-up       Patient ID: Michael Ann Jr. is a 73 y.o. male with a past medical history of hyperlipidemia, hypertension  HPI  Patient is doing well.  He denies any chest pain, shortness of breath, orthopnea, PND, syncope or presyncope.  He says he is keeping himself healthy.    Past Medical History:   Diagnosis Date    Hypertension      Past Surgical History:   Procedure Laterality Date    HAND SURGERY      HERNIA REPAIR      NECK SURGERY      PROSTATECTOMY       Social History     Socioeconomic History    Marital status:      Spouse name: Not on file    Number of children: Not on file    Years of education: Not on file    Highest education level: Not on file   Social Needs    Financial resource strain: Not on file    Food insecurity - worry: Not on file    Food insecurity - inability: Not on file    Transportation needs - medical: Not on file    Transportation needs - non-medical: Not on file   Occupational History    Not on file   Tobacco Use    Smoking status: Never Smoker    Smokeless tobacco: Never Used   Substance and Sexual Activity    Alcohol use: No     Alcohol/week: 0.0 oz    Drug use: No    Sexual activity: Not on file   Other Topics Concern    Not on file   Social History Narrative    Not on file     No family history on file.    Review of patient's allergies indicates:  No Known Allergies       Medication List           Accurate as of 10/9/18  2:09 PM. If you have any questions, ask your nurse or doctor.               START taking these medications    aspirin 81 MG EC tablet  Commonly known as:  ECOTRIN  Take 1 tablet (81 mg total) by mouth once daily.  Replaces:  aspirin 325 MG tablet        CHANGE how you take these medications    * gabapentin 100 MG capsule  Commonly known as:  NEURONTIN  What changed:  Another medication with the same name was changed. Make  "sure you understand how and when to take each.     * gabapentin 100 MG capsule  Commonly known as:  NEURONTIN  TAKE ONE CAPSULE BY MOUTH IN THE MORNING AND ONE CAPSULE IN THE AFTERNOON AND THREE CAPSULES AT BEDTIME  What changed:    · how much to take  · when to take this  · additional instructions         * This list has 2 medication(s) that are the same as other medications prescribed for you. Read the directions carefully, and ask your doctor or other care provider to review them with you.            CONTINUE taking these medications    amLODIPine 2.5 MG tablet  Commonly known as:  NORVASC  TAKE ONE TABLET BY MOUTH ONCE DAILY     atorvastatin 40 MG tablet  Commonly known as:  LIPITOR  Take 1 tablet (40 mg total) by mouth once daily.     CLARITIN ORAL     triamterene-hydrochlorothiazide 37.5-25 mg 37.5-25 mg per tablet  Commonly known as:  MAXZIDE-25  Take 1 tablet by mouth once daily.        STOP taking these medications    aspirin 325 MG tablet  Replaced by:  aspirin 81 MG EC tablet           Where to Get Your Medications      You can get these medications from any pharmacy    You don't need a prescription for these medications  · aspirin 81 MG EC tablet         Review of Systems  Constitution: Denies chills, fever, and sweats.  HENT: Denies headaches or blurry vision.  Cardiovascular: Denies chest pain or irregular heart beat.  Respiratory: Denies cough or shortness of breath.  Gastrointestinal: Denies abdominal pain, nausea, or vomiting.  Musculoskeletal: Denies muscle cramps.  Neurological: Denies dizziness or focal weakness.  Psychiatric/Behavioral: Normal mental status.  Hematologic/Lymphatic: Denies bleeding problem or easy bruising/bleeding.  Skin: Denies rash or suspicious lesions    Physical Examination  /86 (BP Location: Right arm, Patient Position: Sitting, BP Method: Medium (Automatic))   Pulse 83   Resp 18   Ht 5' 7" (1.702 m)   Wt 84.8 kg (186 lb 15.2 oz)   SpO2 95%   BMI 29.28 kg/m² "     Constitutional: No acute distress, conversant  HEENT: Sclera anicteric, Pupils equal, round and reactive to light, extraocular motions intact, Oropharynx clear  Neck: No JVD, no carotid bruits  Cardiovascular: regular rate and rhythm, no murmur, rubs or gallops, normal S1/S2  Pulmonary: Clear to auscultation bilaterally  Abdominal: Abdomen soft, nontender, nondistended, positive bowel sounds  Extremities: No lower extremity edema,   Pulses:  Carotid pulses are 2+ on the right side, and 2+ on the left side.  Radial pulses are 2+ on the right side, and 2+ on the left side.       Skin: No ecchymosis, erythema, or ulcers  Psych: Alert and oriented x 3, appropriate affect  Neuro: CNII-XII intact, no focal deficits    Labs:  Most Recent Data  CBC:   Lab Results   Component Value Date    WBC 8.00 08/15/2017    HGB 15.8 08/15/2017    HCT 46.5 08/15/2017     08/15/2017    MCV 93 08/15/2017    RDW 14.5 08/15/2017     BMP:   Lab Results   Component Value Date     08/14/2018    K 3.9 08/14/2018     08/14/2018    CO2 30 (H) 08/14/2018    BUN 22 08/14/2018    CREATININE 0.8 08/14/2018    GLU 92 08/14/2018    CALCIUM 9.0 08/14/2018     LFTS;   Lab Results   Component Value Date    PROT 6.4 08/14/2018    ALBUMIN 3.4 (L) 08/14/2018    BILITOT 1.0 08/14/2018    AST 26 08/14/2018    ALKPHOS 56 08/14/2018    ALT 30 08/14/2018     COAGS: No results found for: INR, PROTIME, PTT  FLP:   Lab Results   Component Value Date    CHOL 151 08/14/2018    HDL 42 08/14/2018    LDLCALC 92.4 08/14/2018    TRIG 83 08/14/2018    CHOLHDL 27.8 08/14/2018     CARDIAC:   Lab Results   Component Value Date    BNP 23 08/15/2017       Imaging:    EKG:  Normal sinus rhythm.  Nonspecific ST T wave changes.    Stress Testing:  CONCLUSIONS: NORMAL MYOCARDIAL PERFUSION PET STRESS TEST  1. The perfusion scan is free of evidence for myocardial ischemia or injury.   2. Resting wall motion is physiologic. Stress wall motion is physiologic.   3.  Visually estimated LV function is normal at rest and normal at stress.   4. The ventricular volumes are normal at rest and stress.   5. The extracardiac distribution of radioactivity is normal.   6. There was no previous study available to compare.  I have personally reviewed these images and echo data    Assessment/Plan:  Michael was seen today for follow-up.    Diagnoses and all orders for this visit:    Family history of ASCVD    Hypertension, unspecified type  -     EKG 12-lead    Hyperlipidemia, unspecified hyperlipidemia type    Other orders  -     aspirin (ECOTRIN) 81 MG EC tablet; Take 1 tablet (81 mg total) by mouth once daily.       I reduced his aspirin to 81 mg instead of the 325 mg.  Rest of his medications remains as it is.  His blood pressure is well under control to the patient on the small dose of amlodipine and the diuretics..  Recommendations regarding healthy lifestyle, diet and exercise provided     Follow-up in about 1 year (around 10/9/2019).        Total duration of face to face visit time 15 minutes.  Total time spent counseling greater than fifty percent of total visit time.  Counseling included discussion regarding imaging findings, diagnosis, possibilities, treatment options, risks and benefits.  The patient had many questions regarding the options and long-term effect which were all answered to my best ability.      Ochao Santoro MD,MRCP,RPVI,FACC,FSCAI.  Interventional Cardiology   Phone [phone]

## 2018-11-06 ENCOUNTER — PATIENT OUTREACH (OUTPATIENT)
Dept: ADMINISTRATIVE | Facility: HOSPITAL | Age: 73
End: 2018-11-06

## 2018-11-06 NOTE — LETTER
"November 6, 2018    Michael Ann Jr.  [address]  [place] [address]             [facility] [address] [place] [address]  Phone: [phone] Dear [facility] is committed to your overall health and would like to ensure that you are up to date on your recommended test and/or procedures.   Daniel Baptiste MD  has found that your chart shows you may be due for the following:    COLORECTAL CANCER SCREENING    If you have already had your screening, or you have made an appointment for your screening, congratulations!  You're on the road to good health. If you haven't signed up for a colorectal screening please accept this invitation to be screened.      According to the American Cancer Society, colon cancer is the third most common cancer for people in the [place].  A simple screening test "Fit Kit" - done in your own home - can help find colon cancer at an early stage when it can be treated, even before any signs or symptoms develop.    Testing for blood in your stool (feces or bowel movement) is the first step. If you have an upcoming visit with your Primary Care Physician you can  a Fit Kit during your visit or you can  a Fit Kit at your Primary Care Clinic prior to your visit.    The Fit Kit includes:    · Instructions on how to perform the test  · (1) Sheet of tissue paper  · (1) Small Absorption pad  · (1) Bottle to hold the sample and a small probe to help you take the sample  · (1) Small plastic bio-hazard bag  · (1) Postage-paid return envelope    Please do your test (the instructions will tell you how) and then return your sample in the postage-paid return envelope within 24 hours of collection.     If your test results are negative, you won't need testing again for another year.  If results show you need more testing, we will call you with next steps.    Regular colorectal cancer screening is one of the most powerful weapons for " "preventing colon cancer.  The website https://www.cancer.org/cancer/colon-rectal-cancer.html can answer many of your questions about this cancer and its prevention.  Just search for "colorectal cancer".    If you have any questions please call [facility] TOUCH [phone] for assistance.    If you have had any of the above done at another facility, please let us know so that we may obtain copies from that facility.  If you have a copy of these records, please provide a copy for us to scan into your chart.  You are welcome to request that the report be faxed to us at  ([phone]).     Otherwise, please schedule these appointments at your earliest convenience by calling [phone] or going to [email or web address].        Sincerely,  [facility]  MD Ewa Cheng LPN Clinical Care Coordinator  [facility] [address] [place] [address]  Phone [phone]  Fax [phone]         "

## 2018-11-07 ENCOUNTER — TELEPHONE (OUTPATIENT)
Dept: FAMILY MEDICINE | Facility: CLINIC | Age: 73
End: 2018-11-07

## 2018-11-07 NOTE — TELEPHONE ENCOUNTER
Advised wife that there should not be any issues with that but to be on the safe side we could reschedule the labs for tomorrow. Appt rescheduled.

## 2018-11-07 NOTE — TELEPHONE ENCOUNTER
----- Message from Kanchan Rendon sent at 11/7/2018 10:42 AM CST -----  Contact: Izabel Ann (Spouse)  Izabel Ann (Spouse) calling state pt on Monday is having a surgery with dermatologist to remove a skin cancer and she wants to know if that will effect the fasting lab he has for 11/13 or if they should do the labs this week...[phone]

## 2018-11-08 ENCOUNTER — LAB VISIT (OUTPATIENT)
Dept: LAB | Facility: HOSPITAL | Age: 73
End: 2018-11-08
Attending: INTERNAL MEDICINE
Payer: MEDICARE

## 2018-11-08 DIAGNOSIS — E78.5 HYPERLIPIDEMIA, UNSPECIFIED HYPERLIPIDEMIA TYPE: ICD-10-CM

## 2018-11-08 DIAGNOSIS — I10 ESSENTIAL HYPERTENSION: ICD-10-CM

## 2018-11-08 LAB
ALBUMIN SERPL BCP-MCNC: 3.5 G/DL
ALP SERPL-CCNC: 55 U/L
ALT SERPL W/O P-5'-P-CCNC: 33 U/L
ANION GAP SERPL CALC-SCNC: 9 MMOL/L
AST SERPL-CCNC: 27 U/L
BASOPHILS # BLD AUTO: 0.05 K/UL
BASOPHILS NFR BLD: 0.8 %
BILIRUB SERPL-MCNC: 1.4 MG/DL
BUN SERPL-MCNC: 15 MG/DL
CALCIUM SERPL-MCNC: 9.5 MG/DL
CHLORIDE SERPL-SCNC: 104 MMOL/L
CHOLEST SERPL-MCNC: 175 MG/DL
CHOLEST/HDLC SERPL: 4.3 {RATIO}
CO2 SERPL-SCNC: 28 MMOL/L
CREAT SERPL-MCNC: 0.8 MG/DL
DIFFERENTIAL METHOD: ABNORMAL
EOSINOPHIL # BLD AUTO: 0.2 K/UL
EOSINOPHIL NFR BLD: 2.5 %
ERYTHROCYTE [DISTWIDTH] IN BLOOD BY AUTOMATED COUNT: 13.6 %
EST. GFR  (AFRICAN AMERICAN): >60 ML/MIN/1.73 M^2
EST. GFR  (NON AFRICAN AMERICAN): >60 ML/MIN/1.73 M^2
GLUCOSE SERPL-MCNC: 87 MG/DL
HCT VFR BLD AUTO: 47.8 %
HDLC SERPL-MCNC: 41 MG/DL
HDLC SERPL: 23.4 %
HGB BLD-MCNC: 16.6 G/DL
IMM GRANULOCYTES # BLD AUTO: 0.01 K/UL
IMM GRANULOCYTES NFR BLD AUTO: 0.2 %
LDLC SERPL CALC-MCNC: 109.2 MG/DL
LYMPHOCYTES # BLD AUTO: 1.7 K/UL
LYMPHOCYTES NFR BLD: 27.2 %
MCH RBC QN AUTO: 32.5 PG
MCHC RBC AUTO-ENTMCNC: 34.7 G/DL
MCV RBC AUTO: 94 FL
MONOCYTES # BLD AUTO: 0.7 K/UL
MONOCYTES NFR BLD: 11.6 %
NEUTROPHILS # BLD AUTO: 3.7 K/UL
NEUTROPHILS NFR BLD: 57.7 %
NONHDLC SERPL-MCNC: 134 MG/DL
NRBC BLD-RTO: 0 /100 WBC
PLATELET # BLD AUTO: 201 K/UL
PMV BLD AUTO: 10.3 FL
POTASSIUM SERPL-SCNC: 4 MMOL/L
PROT SERPL-MCNC: 6.4 G/DL
RBC # BLD AUTO: 5.1 M/UL
SODIUM SERPL-SCNC: 141 MMOL/L
TRIGL SERPL-MCNC: 124 MG/DL
WBC # BLD AUTO: 6.37 K/UL

## 2018-11-08 PROCEDURE — 36415 COLL VENOUS BLD VENIPUNCTURE: CPT | Mod: PO

## 2018-11-08 PROCEDURE — 80053 COMPREHEN METABOLIC PANEL: CPT

## 2018-11-08 PROCEDURE — 80061 LIPID PANEL: CPT

## 2018-11-08 PROCEDURE — 85025 COMPLETE CBC W/AUTO DIFF WBC: CPT

## 2018-11-18 DIAGNOSIS — R60.0 BILATERAL LEG EDEMA: ICD-10-CM

## 2018-11-18 DIAGNOSIS — I10 ESSENTIAL HYPERTENSION: ICD-10-CM

## 2018-11-19 RX ORDER — ATORVASTATIN CALCIUM 40 MG/1
TABLET, FILM COATED ORAL
Qty: 90 TABLET | Refills: 3 | Status: SHIPPED | OUTPATIENT
Start: 2018-11-19 | End: 2019-11-12 | Stop reason: SDUPTHER

## 2018-11-19 RX ORDER — TRIAMTERENE/HYDROCHLOROTHIAZID 37.5-25 MG
TABLET ORAL
Qty: 30 TABLET | Refills: 11 | Status: SHIPPED | OUTPATIENT
Start: 2018-11-19 | End: 2019-12-14 | Stop reason: SDUPTHER

## 2018-11-20 ENCOUNTER — DOCUMENTATION ONLY (OUTPATIENT)
Dept: FAMILY MEDICINE | Facility: CLINIC | Age: 73
End: 2018-11-20

## 2018-11-20 ENCOUNTER — OFFICE VISIT (OUTPATIENT)
Dept: FAMILY MEDICINE | Facility: CLINIC | Age: 73
End: 2018-11-20
Payer: MEDICARE

## 2018-11-20 VITALS
HEART RATE: 67 BPM | OXYGEN SATURATION: 97 % | RESPIRATION RATE: 16 BRPM | WEIGHT: 186.31 LBS | DIASTOLIC BLOOD PRESSURE: 86 MMHG | BODY MASS INDEX: 29.24 KG/M2 | HEIGHT: 67 IN | SYSTOLIC BLOOD PRESSURE: 130 MMHG

## 2018-11-20 DIAGNOSIS — I10 HYPERTENSION, UNSPECIFIED TYPE: Primary | ICD-10-CM

## 2018-11-20 DIAGNOSIS — E78.5 HYPERLIPIDEMIA, UNSPECIFIED HYPERLIPIDEMIA TYPE: ICD-10-CM

## 2018-11-20 PROCEDURE — 99213 OFFICE O/P EST LOW 20 MIN: CPT | Mod: S$GLB,,, | Performed by: INTERNAL MEDICINE

## 2018-11-20 RX ORDER — PNEUMOCOCCAL 13-VALENT CONJUGATE VACCINE 2.2; 2.2; 2.2; 2.2; 2.2; 4.4; 2.2; 2.2; 2.2; 2.2; 2.2; 2.2; 2.2 UG/.5ML; UG/.5ML; UG/.5ML; UG/.5ML; UG/.5ML; UG/.5ML; UG/.5ML; UG/.5ML; UG/.5ML; UG/.5ML; UG/.5ML; UG/.5ML; UG/.5ML
INJECTION, SUSPENSION INTRAMUSCULAR
Refills: 0 | COMMUNITY
Start: 2018-10-19 | End: 2023-02-10

## 2018-11-20 RX ORDER — MINOCYCLINE HYDROCHLORIDE 100 MG/1
100 CAPSULE ORAL 2 TIMES DAILY
COMMUNITY
End: 2021-01-20

## 2018-11-20 NOTE — PATIENT INSTRUCTIONS
Take lipitor at night.     Do Fit test.     Lose 5 pounds.  Suggest [employer] diet the

## 2018-11-20 NOTE — PROGRESS NOTES
Subjective:       Patient ID: Michael Ann Jr. is a 73 y.o. male.    Chief Complaint: Hyperlipidemia (labs)    HPI     Had basal removed from shoulder.       CHIEF COMPLAINT: Hypertension  HPI:     ONSET:      QUALITY/COURSE:   Unchanged.     INTENSITY/SEVERITY:  Average blood pressure is ? .     MODIFIERS/TREATMENTS:  Taking medications: yes. .High sodium intake: no. alcohol: no      The following symptoms are positive only if BOLDED, otherwise are negative.      SYMPTOMS/RELATED: Possible medication side effects include:   Depression..  . Cough. . Constipation.    REVIEW OF SYMPTOMS: . Weight_loss . Weight_gain . Leg_cramps .Potency_problems .    TARGET ORGAN DAMAGE:: angina/ prior myocardial infarction, chronic kidney disease, heart failure, left ventricular hypertrophy, peripheral artery disease, prior coronary revascularization, retinopathy, stroke. transient ischemic attack.        CHIEF COMPLAINT: Hyperlipidemia. cholesterol screening: no.   HPI:     ONSET:    MODIFIERS/TREATMENTS: . Taking medications: yes. But in morning.  . Non-compliance with following diet: no. .     SYMPTOMS/RELATED:Possible medication side effects include:   Myalgia: no.  .     REVIEW OF SYMPTOMS: past weights:   Wt Readings from Last 1 Encounters:   11/20/18 0809 84.5 kg (186 lb 4.6 oz)                                                     Last lipids: total   Lab Results   Component Value Date    CHOL 175 11/08/2018    CHOL 151 08/14/2018    CHOL 148 05/15/2018                                                                     HDL   Lab Results   Component Value Date    HDL 41 11/08/2018    HDL 42 08/14/2018    HDL 44 05/15/2018                                                                     LDL   Lab Results   Component Value Date    LDLCALC 109.2 11/08/2018    LDLCALC 92.4 08/14/2018    LDLCALC 87.8 05/15/2018                                                                     TRIG   Lab Results   Component Value Date  "   TRIG 124 11/08/2018    TRIG 83 08/14/2018    TRIG 81 05/15/2018                                                                         Review of Systems   Eyes: Negative for visual disturbance.   Respiratory: Negative for chest tightness and shortness of breath.    Cardiovascular: Negative for chest pain and leg swelling.   Neurological: Negative for dizziness, syncope, weakness and headaches.   Psychiatric/Behavioral: Negative for dysphoric mood. The patient is not nervous/anxious.          Objective:      Vitals:    11/20/18 0809   BP: 130/86   Pulse: 67   Resp: 16   SpO2: 97%   Weight: 84.5 kg (186 lb 4.6 oz)   Height: 5' 7" (1.702 m)   PainSc: 0-No pain     Physical Exam   Constitutional: He appears well-developed and well-nourished.   Cardiovascular: Normal rate, regular rhythm and normal heart sounds.   Pulmonary/Chest: Effort normal and breath sounds normal.   Abdominal: Soft. There is no tenderness.   Neurological: He is alert.   Psychiatric: He has a normal mood and affect. His behavior is normal. Thought content normal.   Nursing note and vitals reviewed.        Assessment:       1. Hypertension, unspecified type    2. Hyperlipidemia, unspecified hyperlipidemia type          Plan:       Hypertension, unspecified type    Hyperlipidemia, unspecified hyperlipidemia type      Follow-up in about 6 months (around 5/20/2019).      "

## 2019-01-09 ENCOUNTER — TELEPHONE (OUTPATIENT)
Dept: FAMILY MEDICINE | Facility: CLINIC | Age: 74
End: 2019-01-09

## 2019-01-09 DIAGNOSIS — I10 ESSENTIAL HYPERTENSION: ICD-10-CM

## 2019-01-09 DIAGNOSIS — E78.5 HYPERLIPIDEMIA, UNSPECIFIED HYPERLIPIDEMIA TYPE: Primary | ICD-10-CM

## 2019-01-09 NOTE — TELEPHONE ENCOUNTER
----- Message from Trish Vieira sent at 1/9/2019 10:50 AM CST -----  Type: Needs Medical Advice    Who Called:  Wife-Izabel Ann     Best Call Back Number: [phone]    Additional Information: Stating there are no orders for labs before his 3 mo fu with Dr. Baptiste. Please order and contact patient

## 2019-01-14 ENCOUNTER — DOCUMENTATION ONLY (OUTPATIENT)
Dept: RADIATION ONCOLOGY | Facility: CLINIC | Age: 74
End: 2019-01-14

## 2019-01-14 ENCOUNTER — OFFICE VISIT (OUTPATIENT)
Dept: RADIATION ONCOLOGY | Facility: CLINIC | Age: 74
End: 2019-01-14
Payer: MEDICARE

## 2019-01-14 VITALS
RESPIRATION RATE: 16 BRPM | HEART RATE: 79 BPM | TEMPERATURE: 98 F | DIASTOLIC BLOOD PRESSURE: 88 MMHG | BODY MASS INDEX: 29.4 KG/M2 | SYSTOLIC BLOOD PRESSURE: 137 MMHG | WEIGHT: 187.69 LBS

## 2019-01-14 DIAGNOSIS — C44.619 BASAL CELL CARCINOMA (BCC) OF LEFT SHOULDER: Primary | ICD-10-CM

## 2019-01-14 PROCEDURE — 99204 OFFICE O/P NEW MOD 45 MIN: CPT | Mod: ,,, | Performed by: RADIOLOGY

## 2019-01-14 PROCEDURE — 99204 PR OFFICE/OUTPT VISIT, NEW, LEVL IV, 45-59 MIN: ICD-10-PCS | Mod: ,,, | Performed by: RADIOLOGY

## 2019-01-14 NOTE — PROGRESS NOTES
Michael Ann Jr.  [id number]  1945  1/14/2019  Katie Guzman Md  [address] [facility]  [address]  [place] [address]    REASON FOR CONSULTATION: I, [id number] BCC of L shoulder (+PNI)    TREATMENT GOAL: adjuvant    HISTORY OF PRESENT ILLNESS:   73M with extensive sun exposure history and slow growing lesion of left shoulder.    Patient underwent left shoulder excision (5.7 x 2.6 x 0.5cm) with Dr. Guzman that revealed 0.4cm basal cell carcinoma with focal perineural invasion. Margins on resection were clear.    Patient is without complaint specifically denying new lumps or bumps, fever or chills, chest pain, cough shortness of breath, hemoptysis, bone pain. He denies nerve pain at the resected site. He recently underwent resection on the left forearm for nonmelanoma skin cancer.    Review of Systems   Constitutional: Negative for appetite change, chills, fever and unexpected weight change.   HENT:   Negative for lump/mass, mouth sores, sore throat, trouble swallowing and voice change.    Eyes: Negative for eye problems and icterus.   Respiratory: Negative for chest tightness, cough, hemoptysis and shortness of breath.    Cardiovascular: Negative for chest pain and leg swelling.   Gastrointestinal: Negative for abdominal distention, abdominal pain, blood in stool, constipation, diarrhea, nausea and vomiting.   Genitourinary: Negative for bladder incontinence, difficulty urinating, dysuria, frequency, hematuria and nocturia.    Musculoskeletal: Negative for back pain, gait problem, neck pain and neck stiffness.   Skin: Positive for wound.   Neurological: Negative for extremity weakness, gait problem, headaches, numbness and seizures.   Hematological: Negative for adenopathy.     Past Medical History:   Diagnosis Date    Hypertension      Past Surgical History:   Procedure Laterality Date    HAND SURGERY      HERNIA REPAIR      NECK SURGERY      PROSTATECTOMY       Social History     Socioeconomic  History    Marital status:      Spouse name: Not on file    Number of children: Not on file    Years of education: Not on file    Highest education level: Not on file   Social Needs    Financial resource strain: Not on file    Food insecurity - worry: Not on file    Food insecurity - inability: Not on file    Transportation needs - medical: Not on file    Transportation needs - non-medical: Not on file   Occupational History    Not on file   Tobacco Use    Smoking status: Never Smoker    Smokeless tobacco: Never Used   Substance and Sexual Activity    Alcohol use: No     Alcohol/week: 0.0 oz    Drug use: No    Sexual activity: Not on file   Other Topics Concern    Not on file   Social History Narrative    Not on file     No family history on file.    PRIOR HISTORY OF CHEMOTHERAPY OR RADIOTHERAPY: Please see HPI for patients prior oncologic history.       Medication List           Accurate as of 1/14/19  2:51 PM. If you have any questions, ask your nurse or doctor.               CHANGE how you take these medications    gabapentin 100 MG capsule  Commonly known as:  NEURONTIN  TAKE ONE CAPSULE BY MOUTH IN THE MORNING AND ONE CAPSULE IN THE AFTERNOON AND THREE CAPSULES AT BEDTIME  What changed:    · how much to take  · when to take this  · additional instructions        CONTINUE taking these medications    amLODIPine 2.5 MG tablet  Commonly known as:  NORVASC  TAKE ONE TABLET BY MOUTH ONCE DAILY     aspirin 81 MG EC tablet  Commonly known as:  ECOTRIN  Take 1 tablet (81 mg total) by mouth once daily.     atorvastatin 40 MG tablet  Commonly known as:  LIPITOR  TAKE ONE TABLET BY MOUTH ONCE DAILY     CLARITIN ORAL     FLUZONE HIGH-DOSE 2018-19 (PF) 180 mcg/0.5 mL vaccine  Generic drug:  influenza     minocycline 100 MG capsule  Commonly known as:  MINOCIN,DYNACIN     PREVNAR 13 (PF) 0.5 mL Syrg  Generic drug:  pneumoc 13-claudette conj-dip cr(PF)     triamterene-hydrochlorothiazide 37.5-25 mg 37.5-25  mg per tablet  Commonly known as:  MAXZIDE-25  TAKE ONE TABLET BY MOUTH ONCE DAILY          Review of patient's allergies indicates:  No Known Allergies    QUALITY OF LIFE: 100%- Normal, No Complaints, No Evidence of Disease    Vitals:    01/14/19 1404 01/14/19 1406   BP: 137/88    Pulse: 79    Resp: 16    Temp: 97.9 °F (36.6 °C)    TempSrc: Oral    SpO2:  (P) 95%   Weight: 85.1 kg (187 lb 11.2 oz)    PainSc: 0-No pain      Body mass index is 29.4 kg/m².    PHYSICAL EXAM:   GENERAL: alert; in no apparent distress.   HEAD: normocephalic, atraumatic.  EYES: pupils are equal, round, reactive to light and accommodation. Sclera anicteric. Conjunctiva not injected.   NOSE/THROAT: no nasal erythema or rhinorrhea. Oropharynx pink, without erythema, ulcerations or thrush.   NECK: no cervical motion rigidity; supple with no masses.  CHEST: Patient is speaking comfortably on room air with normal work of breathing without using accessory muscles of respiration.  MUSCULOSKELETAL: no tenderness to palpation along the spine or scapulae. Normal range of motion.  NEUROLOGIC: cranial nerves II-XII intact bilaterally. Strength 5/5 in bilateral upper and lower extremities. No sensory deficits appreciated.  Normal gait.  LYMPHATIC: no cervical, supraclavicular adenopathy appreciated bilaterally.   EXTREMITIES: no clubbing, cyanosis, edema.  SKIN: Left shoulder healed, clean dry and intact without discharge, seroma, nodularity or fluctuance. Left forearm incision not yet healed; without evidence of infection.    REVIEW OF IMAGING/PATHOLOGY/LABS: Please see HPI. All images reviewed personally by dictating physician.   No results found.    ASSESSMENT: 73 y.o. male with stage I, [id number] BCC of L shoulder (+PNI) s/p margin (-) resection.  PLAN:  Michael Ann Jr. presents with significant sun exposure that led to development of a basal cell carcinoma of the left shoulder. He has no history of immunosuppression. He underwent margin  (-) wide local excision with pathology demonstrating evidence of perineural invasion. He has been referred for adjuvant therapy. I carefully explained to the patient that traditionally basal cell carcinomas rarely metastasize but one likely route would be via nerve dissemination. Because of this concern I would recommend adjuvant therapy to this site. I will use a standard NCCN postoperative regimen of 50 gray in 20 fractions to cover the entire scar with margin. We will use superficial electrons as well as customized cutout and bolus to design a treatment field. I described the process to the patient and he would like to proceed with treatment.    I carefully explained the process of clinical simulation and treatment delivery with weekly physician visits.     We discussed the risks and benefits of the above treatment and have gone over in detail the acute and late toxicities of radiation therapy to the left shoulder. The patient expressed  understanding and has signed a consent form which is included in the patients chart.     The patient has our contact information and understands that they are free to contact us at any time with questions or concerns regarding radiation therapy.    DISPOSITION: Clinical SIM    I have personally seen and evaluated this patient. Greater than 50% of this time was spent discussing coordination of care and/or counseling.    PHYSICIAN: Luis Fernando Puente Jr, MD

## 2019-01-14 NOTE — PROGRESS NOTES
Michael Jadiel Ann Jr.  [id number]  1945  1/14/2019  No referring provider defined for this encounter.    DIAGNOSIS: Cancer Staging  Basal cell carcinoma (BCC) of left shoulder  Staging form: Melanoma of the Skin, AJCC 8th Edition  - Pathologic: pT1, pN0, cM0 - Signed by Luis Fernando Puente Jr., MD on 1/14/2019      TREATMENT SITE(S): left shoulder    INTENT: CURATIVE    TREATMENT SETTING: ADJUVANT     MODALITY: ELECTRON    TECHNIQUE:  En face with DIODES    IMRT MEDICAL NECESSITY: N/A    I have personally performed treatment planning for the patient, reviewing relevant history/physical and imaging. I have defined GTV, CTV, PTV and organs at risk.     In order to accomplish this plan, I am ordering:  SIMULATION: CLINICAL SIMULATION TO VERIFY PORTALS AND DESIGN BLOCKS    CONTRAST: none    TO ACCOMPLISH REPRODUCIBLE POSITION: none    DEVICES FOR BEAM SHAPING: BLOCKS    CUSTOMIZED BOLUS: 1CM DAILY    IMAGING: NONE    I have ordered a weekly physics check.    SPECIAL PHYSICS CONSULT: NO  REASON: N/A    SPECIAL TREATMENT CIRCUMSTANCE: NO  Concurrent or recent administration of chemotherapeutic agents which are known potent radiosensitizers and thus will require vigilant monitoring for exaggerated radiation toxicities.    LABS: NONE    ANTICIPATED PRESCRIPTION TO ISOCENTER: 50Gy/20frx    ENERGY: 6e-    TREATMENT: DAILY    PHYSICIAN: Luis Fernando Puente Jr, MD

## 2019-01-17 ENCOUNTER — LAB VISIT (OUTPATIENT)
Dept: LAB | Facility: HOSPITAL | Age: 74
End: 2019-01-17
Attending: SPECIALIST
Payer: MEDICARE

## 2019-01-17 DIAGNOSIS — C61 MALIGNANT NEOPLASM OF PROSTATE: Primary | ICD-10-CM

## 2019-01-17 LAB — COMPLEXED PSA SERPL-MCNC: <0.01 NG/ML

## 2019-01-17 PROCEDURE — 84153 ASSAY OF PSA TOTAL: CPT

## 2019-01-17 PROCEDURE — 36415 COLL VENOUS BLD VENIPUNCTURE: CPT | Mod: PO

## 2019-03-13 ENCOUNTER — OFFICE VISIT (OUTPATIENT)
Dept: RADIATION ONCOLOGY | Facility: CLINIC | Age: 74
End: 2019-03-13
Payer: MEDICARE

## 2019-03-13 VITALS — BODY MASS INDEX: 28.51 KG/M2 | WEIGHT: 182 LBS

## 2019-03-13 DIAGNOSIS — C44.619 BASAL CELL CARCINOMA (BCC) OF LEFT SHOULDER: Primary | ICD-10-CM

## 2019-03-13 PROCEDURE — 99024 PR POST-OP FOLLOW-UP VISIT: ICD-10-PCS | Mod: ,,, | Performed by: RADIOLOGY

## 2019-03-13 PROCEDURE — 99024 POSTOP FOLLOW-UP VISIT: CPT | Mod: ,,, | Performed by: RADIOLOGY

## 2019-03-13 NOTE — PROGRESS NOTES
Michael Ann   [id number]  1945  3/13/2019  Katie Guzman Md  [address] [facility]  [address]  [place] [address]    DIAGNOSIS: Cancer Staging  Basal cell carcinoma (BCC) of left shoulder  Staging form: Melanoma of the Skin, AJCC 8th Edition  - Pathologic: pT1, pN0, cM0 - Signed by Luis Fernando Puente Jr., MD on 1/14/2019    REASON FOR VISIT: Routine scheduled follow-up.    HISTORY OF PRESENT ILLNESS:   73M with extensive sun exposure history and slow growing lesion of left shoulder.    Patient underwent left shoulder excision (5.7 x 2.6 x 0.5cm) with Dr. Guzman that revealed 0.4cm basal cell carcinoma with focal perineural invasion. Margins on resection were clear.    Patient completed 50 gray/20 fractions in February 2019 with expected radiation dermatitis.    INTERVAL HISTORY:   Patient is been applying Aquaphor as needed and has transitioned to petroleum jelly. He denies fatigue or pain or discomfort at the site.    Review of Systems   Constitutional: Negative for appetite change, chills, fever and unexpected weight change.   HENT:   Negative for lump/mass, mouth sores, sore throat, trouble swallowing and voice change.    Eyes: Negative for eye problems and icterus.   Respiratory: Negative for chest tightness, cough, hemoptysis and shortness of breath.    Cardiovascular: Negative for chest pain and leg swelling.   Gastrointestinal: Negative for abdominal distention, abdominal pain, blood in stool, constipation, diarrhea, nausea and vomiting.   Genitourinary: Negative for bladder incontinence, difficulty urinating, dysuria, frequency, hematuria and nocturia.    Musculoskeletal: Negative for back pain, gait problem, neck pain and neck stiffness.   Skin: Positive for itching and wound.   Neurological: Negative for extremity weakness, gait problem, headaches, numbness and seizures.   Hematological: Negative for adenopathy.     Past Medical History:   Diagnosis Date    Colon polyps     Hearing loss      Hiatal hernia     Hypertension     Prostate disease     Tuberculosis      Past Surgical History:   Procedure Laterality Date    eye lid surgey      HAND SURGERY      HAND SURGERY      HERNIA REPAIR      HERNIA REPAIR      NECK SURGERY      NECK SURGERY      PROSTATE SURGERY      PROSTATECTOMY      SHOULDER SURGERY      SKIN CANCER EXCISION       Social History     Socioeconomic History    Marital status:      Spouse name: Not on file    Number of children: Not on file    Years of education: Not on file    Highest education level: Not on file   Social Needs    Financial resource strain: Not on file    Food insecurity - worry: Not on file    Food insecurity - inability: Not on file    Transportation needs - medical: Not on file    Transportation needs - non-medical: Not on file   Occupational History    Not on file   Tobacco Use    Smoking status: Never Smoker    Smokeless tobacco: Never Used   Substance and Sexual Activity    Alcohol use: No     Alcohol/week: 0.0 oz    Drug use: No    Sexual activity: Not on file   Other Topics Concern    Not on file   Social History Narrative    Not on file     Family History   Problem Relation Age of Onset    Prostate cancer Brother     Prostate cancer Brother      Medication List with Changes/Refills   Current Medications    AMLODIPINE (NORVASC) 2.5 MG TABLET    TAKE ONE TABLET BY MOUTH ONCE DAILY    ASPIRIN (ECOTRIN) 81 MG EC TABLET    Take 1 tablet (81 mg total) by mouth once daily.    ATORVASTATIN (LIPITOR) 40 MG TABLET    TAKE ONE TABLET BY MOUTH ONCE DAILY    FLUZONE HIGH-DOSE 2018-19, PF, 180 MCG/0.5 ML VACCINE    ADM 0.5ML IM UTD    GABAPENTIN (NEURONTIN) 100 MG CAPSULE    TAKE ONE CAPSULE BY MOUTH IN THE MORNING AND ONE CAPSULE IN THE AFTERNOON AND THREE CAPSULES AT BEDTIME    LORATADINE (CLARITIN ORAL)    Take by mouth once daily.    MINOCYCLINE (MINOCIN,DYNACIN) 100 MG CAPSULE    Take 100 mg by mouth 2 (two) times daily.     PREVNAR 13, PF, 0.5 ML SYRG    ADM 0.5ML IM UTD    TRIAMTERENE-HYDROCHLOROTHIAZIDE 37.5-25 MG (MAXZIDE-25) 37.5-25 MG PER TABLET    TAKE ONE TABLET BY MOUTH ONCE DAILY     Review of patient's allergies indicates:  No Known Allergies    QUALITY OF LIFE: 100%- Normal, No Complaints, No Evidence of Disease    Vitals:    03/13/19 0938   Weight: 82.6 kg (182 lb)     Body mass index is 28.51 kg/m².    PHYSICAL EXAM:   GENERAL: alert; in no apparent distress.   HEAD: normocephalic, atraumatic.  EYES: pupils are equal, round, reactive to light and accommodation. Sclera anicteric. Conjunctiva not injected.   NOSE/THROAT: no nasal erythema or rhinorrhea. Oropharynx pink, without erythema, ulcerations or thrush.   NECK: no cervical motion rigidity; supple with no masses.  CHEST: cPatient is speaking comfortably on room air with normal work of breathing without using accessory muscles of respiration.  MUSCULOSKELETAL: no tenderness to palpation along the spine or scapulae. Normal range of motion.  NEUROLOGIC: cranial nerves II-XII intact bilaterally. Strength 5/5 in bilateral upper and lower extremities. No sensory deficits appreciated. Normal gait.  EXTREMITIES: no clubbing, cyanosis, edema.  SKIN: Left shoulder with no evidence of moist desquamation, skin is really epithelialized with some retraction about the scar. No nodularity, tenderness, cellulitis or fluctuance.    ANCILLARY DATA: None    ASSESSMENT: 73 y.o. male with stage  I, [id number] BCC of L shoulder (+PNI) status post resection followed by adjuvant radiotherapy to 50 gray ending February 2019.  PLAN:   Michael Ann Jr. did very well with radiotherapy with expected radiation dermatitis which is nearly resolved. I recommended continuing with aloe vera or vitamin E moisturizers to assist with itchiness associated with scar retraction. No evidence of desquamation at today's visit. He will continue to follow Dr. Guzman and I recommended he return to clinic in  3 months for assessment.    All questions answered and contact information provided. Patient understands free to call us anytime with any questions or concerns regarding radiation therapy.    I have personally seen and evaluated this patient. Greater than 50% of this time was spent discussing coordination of care and/or counseling.    PHYSICIAN: Luis Fernando Puente Jr, MD

## 2019-05-07 ENCOUNTER — PATIENT OUTREACH (OUTPATIENT)
Dept: ADMINISTRATIVE | Facility: HOSPITAL | Age: 74
End: 2019-05-07

## 2019-05-07 NOTE — LETTER
"May 7, 2019    Michael Ann Jr.  [address]  [place] [address]             [facility] [address] [place] [address]  Phone: [phone] Dear Herbert [facility] is committed to your overall health and would like to ensure that you are up to date on your recommended test and/or procedures.   Daniel Baptiste MD  has found that your chart shows you may be due for the following:    COLORECTAL CANCER SCREENING    If you haven't signed up for a colorectal screening please accept this invitation to be screened.      According to the American Cancer Society, colon cancer is the third most common cancer for people in the [place].  A simple screening test "Fit Kit" - done in your own home - can help find colon cancer at an early stage when it can be treated, even before any signs or symptoms develop. THIS IS A YEARLY TEST.    Testing for blood in your stool (feces or bowel movement) is the first step. If you have an upcoming visit with your Primary Care Physician you can  a Fit Kit during your visit or you can  a Fit Kit at your Primary Care Clinic prior to your visit.    The Fit Kit includes:    · Instructions on how to perform the test  · (1) Sheet of tissue paper  · (1) Small Absorption pad  · (1) Bottle to hold the sample and a small probe to help you take the sample  · (1) Small plastic bio-hazard bag  · (1) Postage-paid return envelope    Please do your test (the instructions will tell you how) and then return your sample in the postage-paid return envelope within 24 hours of collection.     If your test results are negative, you won't need testing again for another year.  If results show you need more testing, we will call you with next steps.    Regular colorectal cancer screening is one of the most powerful weapons for preventing colon cancer.  The website https://www.cancer.org/cancer/colon-rectal-cancer.html can answer many of your questions about " "this cancer and its prevention.  Just search for "colorectal cancer".  If you have any questions please call [facility] TOUCH [phone] for assistance.  If you have had any of the above done at another facility, please let us know so that we may obtain copies from that facility.  If you have a copy of these records, please provide a copy for us to scan into your chart.  You are welcome to request that the report be faxed to us at  ([phone]).     Otherwise, please schedule these appointments at your earliest convenience by calling [phone] or going to [email or web address].    If you have an upcoming scheduled appointment for the item above, please disregard this letter.    Sincerely,  Your [facility]  MD Ewa Cheng LPN Clinical Care Coordinator  [address] [place] [id number]  [phone]        "

## 2019-05-08 ENCOUNTER — PES CALL (OUTPATIENT)
Dept: ADMINISTRATIVE | Facility: CLINIC | Age: 74
End: 2019-05-08

## 2019-05-14 ENCOUNTER — LAB VISIT (OUTPATIENT)
Dept: LAB | Facility: HOSPITAL | Age: 74
End: 2019-05-14
Attending: INTERNAL MEDICINE
Payer: MEDICARE

## 2019-05-14 DIAGNOSIS — E78.5 HYPERLIPIDEMIA, UNSPECIFIED HYPERLIPIDEMIA TYPE: ICD-10-CM

## 2019-05-14 DIAGNOSIS — I10 ESSENTIAL HYPERTENSION: ICD-10-CM

## 2019-05-14 LAB
ALBUMIN SERPL BCP-MCNC: 3.7 G/DL (ref 3.5–5.2)
ALP SERPL-CCNC: 57 U/L (ref 55–135)
ALT SERPL W/O P-5'-P-CCNC: 45 U/L (ref 10–44)
ANION GAP SERPL CALC-SCNC: 10 MMOL/L (ref 8–16)
AST SERPL-CCNC: 37 U/L (ref 10–40)
BASOPHILS # BLD AUTO: 0.05 K/UL (ref 0–0.2)
BASOPHILS NFR BLD: 0.8 % (ref 0–1.9)
BILIRUB SERPL-MCNC: 1.1 MG/DL (ref 0.1–1)
BUN SERPL-MCNC: 16 MG/DL (ref 8–23)
CALCIUM SERPL-MCNC: 9.7 MG/DL (ref 8.7–10.5)
CHLORIDE SERPL-SCNC: 105 MMOL/L (ref 95–110)
CHOLEST SERPL-MCNC: 158 MG/DL (ref 120–199)
CHOLEST/HDLC SERPL: 3.5 {RATIO} (ref 2–5)
CO2 SERPL-SCNC: 28 MMOL/L (ref 23–29)
CREAT SERPL-MCNC: 0.8 MG/DL (ref 0.5–1.4)
DIFFERENTIAL METHOD: ABNORMAL
EOSINOPHIL # BLD AUTO: 0.1 K/UL (ref 0–0.5)
EOSINOPHIL NFR BLD: 2.1 % (ref 0–8)
ERYTHROCYTE [DISTWIDTH] IN BLOOD BY AUTOMATED COUNT: 14.1 % (ref 11.5–14.5)
EST. GFR  (AFRICAN AMERICAN): >60 ML/MIN/1.73 M^2
EST. GFR  (NON AFRICAN AMERICAN): >60 ML/MIN/1.73 M^2
GLUCOSE SERPL-MCNC: 87 MG/DL (ref 70–110)
HCT VFR BLD AUTO: 50.2 % (ref 40–54)
HDLC SERPL-MCNC: 45 MG/DL (ref 40–75)
HDLC SERPL: 28.5 % (ref 20–50)
HGB BLD-MCNC: 16.8 G/DL (ref 14–18)
IMM GRANULOCYTES # BLD AUTO: 0.02 K/UL (ref 0–0.04)
IMM GRANULOCYTES NFR BLD AUTO: 0.3 % (ref 0–0.5)
LDLC SERPL CALC-MCNC: 90.4 MG/DL (ref 63–159)
LYMPHOCYTES # BLD AUTO: 1.7 K/UL (ref 1–4.8)
LYMPHOCYTES NFR BLD: 26 % (ref 18–48)
MCH RBC QN AUTO: 31.5 PG (ref 27–31)
MCHC RBC AUTO-ENTMCNC: 33.5 G/DL (ref 32–36)
MCV RBC AUTO: 94 FL (ref 82–98)
MONOCYTES # BLD AUTO: 0.7 K/UL (ref 0.3–1)
MONOCYTES NFR BLD: 11.3 % (ref 4–15)
NEUTROPHILS # BLD AUTO: 3.9 K/UL (ref 1.8–7.7)
NEUTROPHILS NFR BLD: 59.5 % (ref 38–73)
NONHDLC SERPL-MCNC: 113 MG/DL
NRBC BLD-RTO: 0 /100 WBC
PLATELET # BLD AUTO: 221 K/UL (ref 150–350)
PMV BLD AUTO: 10.5 FL (ref 9.2–12.9)
POTASSIUM SERPL-SCNC: 4.2 MMOL/L (ref 3.5–5.1)
PROT SERPL-MCNC: 6.7 G/DL (ref 6–8.4)
RBC # BLD AUTO: 5.33 M/UL (ref 4.6–6.2)
SODIUM SERPL-SCNC: 143 MMOL/L (ref 136–145)
TRIGL SERPL-MCNC: 113 MG/DL (ref 30–150)
WBC # BLD AUTO: 6.57 K/UL (ref 3.9–12.7)

## 2019-05-14 PROCEDURE — 80061 LIPID PANEL: CPT

## 2019-05-14 PROCEDURE — 85025 COMPLETE CBC W/AUTO DIFF WBC: CPT

## 2019-05-14 PROCEDURE — 80053 COMPREHEN METABOLIC PANEL: CPT

## 2019-05-14 PROCEDURE — 36415 COLL VENOUS BLD VENIPUNCTURE: CPT | Mod: PO

## 2019-05-21 ENCOUNTER — APPOINTMENT (OUTPATIENT)
Dept: LAB | Facility: HOSPITAL | Age: 74
End: 2019-05-21
Attending: INTERNAL MEDICINE
Payer: MEDICARE

## 2019-05-21 ENCOUNTER — DOCUMENTATION ONLY (OUTPATIENT)
Dept: FAMILY MEDICINE | Facility: CLINIC | Age: 74
End: 2019-05-21

## 2019-05-21 ENCOUNTER — OFFICE VISIT (OUTPATIENT)
Dept: FAMILY MEDICINE | Facility: CLINIC | Age: 74
End: 2019-05-21
Payer: MEDICARE

## 2019-05-21 VITALS
HEART RATE: 72 BPM | OXYGEN SATURATION: 95 % | BODY MASS INDEX: 29.58 KG/M2 | WEIGHT: 188.5 LBS | SYSTOLIC BLOOD PRESSURE: 126 MMHG | DIASTOLIC BLOOD PRESSURE: 74 MMHG | HEIGHT: 67 IN | RESPIRATION RATE: 16 BRPM | TEMPERATURE: 98 F

## 2019-05-21 DIAGNOSIS — I10 ESSENTIAL HYPERTENSION: ICD-10-CM

## 2019-05-21 DIAGNOSIS — E78.5 HYPERLIPIDEMIA, UNSPECIFIED HYPERLIPIDEMIA TYPE: ICD-10-CM

## 2019-05-21 DIAGNOSIS — Z85.46 HISTORY OF PROSTATE CANCER: ICD-10-CM

## 2019-05-21 DIAGNOSIS — Z12.11 COLON CANCER SCREENING: Primary | ICD-10-CM

## 2019-05-21 PROCEDURE — 99213 PR OFFICE/OUTPT VISIT, EST, LEVL III, 20-29 MIN: ICD-10-PCS | Mod: S$GLB,,, | Performed by: INTERNAL MEDICINE

## 2019-05-21 PROCEDURE — 99213 OFFICE O/P EST LOW 20 MIN: CPT | Mod: S$GLB,,, | Performed by: INTERNAL MEDICINE

## 2019-05-21 NOTE — PROGRESS NOTES
Subjective:       Patient ID: Michael Ann Jr. is a 74 y.o. male.    Chief Complaint: Hypertension    HPI           CHIEF COMPLAINT: Hyperlipidemia. cholesterol screening: no.   HPI:     ONSET:    MODIFIERS/TREATMENTS: . Taking medications: yes. . Non-compliance with following diet: no. .     SYMPTOMS/RELATED:Possible medication side effects include:   Myalgia: no.  .     REVIEW OF SYMPTOMS: past weights:   Wt Readings from Last 1 Encounters:   05/21/19 0815 85.5 kg (188 lb 7.9 oz)                                                     Last lipids: total   Lab Results   Component Value Date    CHOL 158 05/14/2019    CHOL 175 11/08/2018    CHOL 151 08/14/2018                                                                     HDL   Lab Results   Component Value Date    HDL 45 05/14/2019    HDL 41 11/08/2018    HDL 42 08/14/2018                                                                     LDL   Lab Results   Component Value Date    LDLCALC 90.4 05/14/2019    LDLCALC 109.2 11/08/2018    LDLCALC 92.4 08/14/2018                                                                     TRIG   Lab Results   Component Value Date    TRIG 113 05/14/2019    TRIG 124 11/08/2018    TRIG 83 08/14/2018                                                                           CHIEF COMPLAINT: Hypertension  HPI:     ONSET:      QUALITY/COURSE:   Unchanged.     INTENSITY/SEVERITY:  Average blood pressure is unknown.      MODIFIERS/TREATMENTS:  Taking medications: yes. .High sodium intake: no. alcohol: no      The following symptoms are positive only if BOLDED, otherwise are negative.      SYMPTOMS/RELATED: Possible medication side effects include:   Depression..  . Cough. . Constipation.    REVIEW OF SYMPTOMS: . Weight_loss . Weight_gain . Leg_cramps .Potency_problems .    TARGET ORGAN DAMAGE:: angina/ prior myocardial infarction, chronic kidney disease, heart failure, left ventricular hypertrophy, peripheral artery disease,  "prior coronary revascularization, retinopathy, stroke. transient ischemic attack.      Review of Systems   Eyes: Negative for visual disturbance.   Respiratory: Negative for chest tightness and shortness of breath.    Cardiovascular: Negative for chest pain and leg swelling.   Neurological: Negative for dizziness, syncope, weakness and headaches.   Psychiatric/Behavioral: Negative for dysphoric mood. The patient is not nervous/anxious.          Objective:      Vitals:    05/21/19 0815   BP: 126/74   Pulse: 72   Resp: 16   Temp: 98 °F (36.7 °C)   TempSrc: Oral   SpO2: 95%   Weight: 85.5 kg (188 lb 7.9 oz)   Height: 5' 7" (1.702 m)   PainSc: 0-No pain     Physical Exam   Constitutional: He appears well-developed and well-nourished.   Cardiovascular: Normal rate, regular rhythm and normal heart sounds.   Pulmonary/Chest: Effort normal and breath sounds normal.   Abdominal: Soft. There is no tenderness.   Neurological: He is alert.   Psychiatric: He has a normal mood and affect. His behavior is normal. Thought content normal.   Nursing note and vitals reviewed.      PSA undetectable  Assessment:       1. Colon cancer screening          Plan:       Colon cancer screening      No follow-ups on file.      "

## 2019-05-21 NOTE — PROGRESS NOTES
"Subjective:       Patient ID: Michael Ann Jr. is a 74 y.o. male.    Chief Complaint: Hypertension    HPI         CHIEF COMPLAINT: Hypertension  HPI:     ONSET:      QUALITY/COURSE:   Unchanged.     INTENSITY/SEVERITY:  Average blood pressure is unknown.      MODIFIERS/TREATMENTS:  Taking medications: yes. .High sodium intake: no. alcohol: no      The following symptoms are positive only if BOLDED, otherwise are negative.      SYMPTOMS/RELATED: Possible medication side effects include:   Depression..  . Cough. . Constipation.    REVIEW OF SYMPTOMS: . Weight_loss . Weight_gain . Leg_cramps .Potency_problems .    TARGET ORGAN DAMAGE:: angina/ prior myocardial infarction, chronic kidney disease, heart failure, left ventricular hypertrophy, peripheral artery disease, prior coronary revascularization, retinopathy, stroke. transient ischemic attack.      Review of Systems      Objective:      Vitals:    05/21/19 0815   BP: 126/74   Pulse: 72   Resp: 16   Temp: 98 °F (36.7 °C)   TempSrc: Oral   SpO2: 95%   Weight: 85.5 kg (188 lb 7.9 oz)   Height: 5' 7" (1.702 m)   PainSc: 0-No pain     Physical Exam   Constitutional: He appears well-developed and well-nourished.   Cardiovascular: Normal rate, regular rhythm and normal heart sounds.   Pulmonary/Chest: Effort normal and breath sounds normal.   Abdominal: Soft. There is no tenderness.   Neurological: He is alert.   Psychiatric: He has a normal mood and affect. His behavior is normal. Thought content normal.   Nursing note and vitals reviewed.        Assessment:       1. Colon cancer screening          Plan:       Colon cancer screening      No follow-ups on file.      "

## 2019-05-21 NOTE — PATIENT INSTRUCTIONS
Lose 5 pounds.  Suggest [facility]    Low-Salt Diet  This diet removes foods that are high in salt. It also limits the amount of salt you use when cooking. It is most often used for people with high blood pressure, edema (fluid retention), and kidney, liver, or heart disease.  Table salt contains the mineral sodium. Your body needs sodium to work normally. But too much sodium can make your health problems worse. Your healthcare provider is recommending a low-salt (also called low-sodium) diet for you. Your total daily allowance of salt is 1,500 to 2,300 milligrams (mg). It is less than 1 teaspoon of table salt. This means you can have only about 500 to 700 mg of sodium at each meal. People with certain health problems should limit salt intake to the lower end of the recommended range.    When you cook, don´t add much salt. If you can cook without using salt, even better. Don´t add salt to your food at the table.  When shopping, read food labels. Salt is often called sodium on the label. Choose foods that are salt-free, low salt, or very low salt. Note that foods with reduced salt may not lower your salt intake enough.    Beans, potatoes, and pasta  Ok: Dry beans, split peas, lentils, potatoes, rice, macaroni, pasta, spaghetti without added salt  Avoid: Potato chips, tortilla chips, and similar products  Breads and cereals  Ok: Low-sodium breads, rolls, cereals, and cakes; low-salt crackers, matzo crackers  Avoid: Salted crackers, pretzels, popcorn, Nauruan toast, pancakes, muffins  Dairy  Ok: Milk, chocolate milk, hot chocolate mix, low-salt cheeses, and yogurt  Avoid: Processed cheese and cheese spreads; Roquefort, Camembert, and cottage cheese; buttermilk, instant breakfast drink  Desserts  Ok: Ice cream, frozen yogurt, juice bars, gelatin, cookies and pies, sugar, honey, jelly, hard candy  Avoid: Most pies, cakes and cookies prepared or processed with salt; instant pudding  Drinks  Ok: Tea, coffee, fizzy  (carbonated) drinks, juices  Avoid: Flavored coffees, electrolyte replacement drinks, sports drinks  Meats  Ok: All fresh meat, fish, poultry, low-salt tuna, eggs, egg substitute  Avoid: Smoked, pickled, brine-cured, or salted meats and fish. This includes barraza, chipped beef, corned beef, hot dogs, deli meats, ham, kosher meats, salt pork, sausage, canned tuna, salted codfish, smoked salmon, herring, sardines, or anchovies.  Seasonings and spices  Ok: Most seasonings are okay. Good substitutes for salt include: fresh herb blends, hot sauce, lemon, garlic, barton, vinegar, dry mustard, parsley, cilantro, horseradish, tomato paste, regular margarine, mayonnaise, unsalted butter, cream cheese, vegetable oil, cream, low-salt salad dressing and gravy.  Avoid: Regular ketchup, relishes, pickles, soy sauce, teriyaki sauce, Worcestershire sauce, BBQ sauce, tartar sauce, meat tenderizer, chili sauce, regular gravy, regular salad dressing, salted butter  Soups  Ok: Low-salt soups and broths made with allowed foods  Avoid: Bouillon cubes, soups with smoked or salted meats, regular soup and broth  Vegetables  Ok: Most vegetables are okay; also low-salt tomato and vegetable juices  Avoid: Sauerkraut and other brine-soaked vegetables; pickles and other pickled vegetables; tomato juice, olives  © 7217-0909 P21. [address] [place] [address]. All rights reserved. This information is not intended as a substitute for professional medical care. Always follow your healthcare professional's instructions.

## 2019-06-12 ENCOUNTER — OFFICE VISIT (OUTPATIENT)
Dept: RADIATION ONCOLOGY | Facility: CLINIC | Age: 74
End: 2019-06-12
Payer: MEDICARE

## 2019-06-12 VITALS
TEMPERATURE: 98 F | DIASTOLIC BLOOD PRESSURE: 85 MMHG | RESPIRATION RATE: 18 BRPM | HEART RATE: 67 BPM | BODY MASS INDEX: 29.49 KG/M2 | WEIGHT: 188.31 LBS | SYSTOLIC BLOOD PRESSURE: 133 MMHG

## 2019-06-12 DIAGNOSIS — C44.619 BASAL CELL CARCINOMA OF LEFT SHOULDER: ICD-10-CM

## 2019-06-12 PROCEDURE — 99212 OFFICE O/P EST SF 10 MIN: CPT | Mod: ,,, | Performed by: RADIOLOGY

## 2019-06-12 PROCEDURE — 99212 PR OFFICE/OUTPT VISIT, EST, LEVL II, 10-19 MIN: ICD-10-PCS | Mod: ,,, | Performed by: RADIOLOGY

## 2019-06-12 NOTE — PROGRESS NOTES
Michael Ann Jr.  [id number]  1945  6/12/2019  Katie Guzman Md  [address] [facility] [facility]  [address]  [place] [address]    DIAGNOSIS: Skin cancer stage I, basal cell carcinoma  REASON FOR VISIT: Routine scheduled follow-up.    HISTORY OF PRESENT ILLNESS:   73M with extensive sun exposure history and slow growing lesion of left shoulder.     Patient underwent left shoulder excision (5.7 x 2.6 x 0.5cm) with Dr. Guzman that revealed 0.4cm basal cell carcinoma with focal perineural invasion. Margins on resection were clear.     Patient completed 50 gray/20 fractions in February 2019 with expected radiation dermatitis    INTERVAL HISTORY:   Currently doing very well. He has no residual inflammation pain or swelling, less tightness noted and good arm ability, good shoulder mobility    Review of Systems   Respiratory: Negative for chest tightness and cough.    Musculoskeletal: Negative for back pain, neck pain and neck stiffness.   Skin: Negative for itching and rash.     Past Medical History:   Diagnosis Date    Colon polyps     Hearing loss     Hiatal hernia     Hypertension     Prostate disease     Tuberculosis      Past Surgical History:   Procedure Laterality Date    eye lid surgey      HAND SURGERY      HAND SURGERY      HERNIA REPAIR      HERNIA REPAIR      NECK SURGERY      NECK SURGERY      PROSTATE SURGERY      PROSTATECTOMY      SHOULDER SURGERY      SKIN CANCER EXCISION      Skin Cancer removed from his left arm and left shoulder       Social History     Socioeconomic History    Marital status:      Spouse name: Not on file    Number of children: Not on file    Years of education: Not on file    Highest education level: Not on file   Occupational History    Not on file   Social Needs    Financial resource strain: Not on file    Food insecurity:     Worry: Not on file     Inability: Not on file    Transportation needs:     Medical: Not on file     Non-medical: Not on  file   Tobacco Use    Smoking status: Never Smoker    Smokeless tobacco: Never Used   Substance and Sexual Activity    Alcohol use: No     Alcohol/week: 0.0 oz    Drug use: No    Sexual activity: Not on file   Lifestyle    Physical activity:     Days per week: Not on file     Minutes per session: Not on file    Stress: Not on file   Relationships    Social connections:     Talks on phone: Not on file     Gets together: Not on file     Attends Mandaeism service: Not on file     Active member of club or organization: Not on file     Attends meetings of clubs or organizations: Not on file     Relationship status: Not on file   Other Topics Concern    Not on file   Social History Narrative    Not on file     Family History   Problem Relation Age of Onset    Prostate cancer Brother     Prostate cancer Brother      Medication List with Changes/Refills   Current Medications    AMLODIPINE (NORVASC) 2.5 MG TABLET    TAKE ONE TABLET BY MOUTH ONCE DAILY    ASPIRIN (ECOTRIN) 81 MG EC TABLET    Take 1 tablet (81 mg total) by mouth once daily.    ATORVASTATIN (LIPITOR) 40 MG TABLET    TAKE ONE TABLET BY MOUTH ONCE DAILY    FLUZONE HIGH-DOSE 2018-19, PF, 180 MCG/0.5 ML VACCINE    ADM 0.5ML IM UTD    GABAPENTIN (NEURONTIN) 100 MG CAPSULE    TAKE ONE CAPSULE BY MOUTH IN THE MORNING AND ONE CAPSULE IN THE AFTERNOON AND THREE CAPSULES AT BEDTIME    LORATADINE (CLARITIN ORAL)    Take by mouth once daily.    MINOCYCLINE (MINOCIN,DYNACIN) 100 MG CAPSULE    Take 100 mg by mouth 2 (two) times daily.    PREVNAR 13, PF, 0.5 ML SYRG    ADM 0.5ML IM UTD    TRIAMTERENE-HYDROCHLOROTHIAZIDE 37.5-25 MG (MAXZIDE-25) 37.5-25 MG PER TABLET    TAKE ONE TABLET BY MOUTH ONCE DAILY     Review of patient's allergies indicates:  No Known Allergies    QUALITY OF LIFE: 100%- Normal, No Complaints, No Evidence of Disease    Vitals:    06/12/19 1001   BP: 133/85   Pulse: 67   Resp: 18   Temp: 97.8 °F (36.6 °C)   TempSrc: Oral   Weight: 85.4 kg (188  lb 4.8 oz)   PainSc: 0-No pain       PHYSICAL EXAM: Well groomed oriented alert  GENERAL: alert; in no apparent distress.   HEAD: normocephalic, atraumatic.  EYES: pupils are equal, round, reactive to light and accommodation. Sclera anicteric. Conjunctiva not injected.   NOSE/THROAT: no nasal erythema or rhinorrhea. Oropharynx pink, without erythema, ulcerations or thrush.   NECK: no cervical motion rigidity; supple with no masses.  .MUSCULOSKELETAL: no tenderness to palpation along the spine or scapulae. Normal range of motion.  NEUROLOGIC: cranial nerves II-XII intact bilaterally. Strength 5/5 in bilateral upper and lower extremities. No sensory deficits appreciated. Reflexes globally intact. No cerebellar signs. Normal gait.  LYMPHATIC: no cervical, supraclavicular or axillary adenopathy appreciated bilaterally.   EXTREMITIES: no clubbing, cyanosis, edema.  SKIN: no erythema, rashes, ulcerations noted.     ANCILLARY DATA:     ASSESSMENT: 73 y.o. male with stage  I, [id number] BCC of L shoulder (+PNI) status post resection followed by adjuvant radiotherapy to 50 gray ending February 2019.  PLAN:  Patient doing well no evidence of recurrence. Follow-up with us as needed. I did recommend routine dermatological evaluation at least annually    All questions answered and contact information provided. Patient understands free to call us anytime with any questions or concerns regarding radiation therapy.    TIME SPENT WITH PATIENT: I have personally seen and evaluated this patient. Greater than 50% of this time was spent discussing coordination of care and/or counseling.      PHYSICIAN: Nabor Hall MD

## 2019-10-10 ENCOUNTER — PES CALL (OUTPATIENT)
Dept: ADMINISTRATIVE | Facility: CLINIC | Age: 74
End: 2019-10-10

## 2019-10-22 DIAGNOSIS — G62.9 PERIPHERAL POLYNEUROPATHY: ICD-10-CM

## 2019-10-22 RX ORDER — GABAPENTIN 100 MG/1
CAPSULE ORAL
Qty: 450 CAPSULE | Refills: 1 | Status: SHIPPED | OUTPATIENT
Start: 2019-10-22 | End: 2020-03-30 | Stop reason: SDUPTHER

## 2019-11-09 DIAGNOSIS — I10 ESSENTIAL HYPERTENSION: ICD-10-CM

## 2019-11-11 RX ORDER — AMLODIPINE BESYLATE 2.5 MG/1
TABLET ORAL
Qty: 90 TABLET | Refills: 3 | Status: SHIPPED | OUTPATIENT
Start: 2019-11-11 | End: 2020-04-02 | Stop reason: SDUPTHER

## 2019-11-11 RX ORDER — ATORVASTATIN CALCIUM 40 MG/1
TABLET, FILM COATED ORAL
Qty: 90 TABLET | Refills: 3 | OUTPATIENT
Start: 2019-11-11

## 2019-11-12 ENCOUNTER — LAB VISIT (OUTPATIENT)
Dept: LAB | Facility: HOSPITAL | Age: 74
End: 2019-11-12
Attending: INTERNAL MEDICINE
Payer: MEDICARE

## 2019-11-12 DIAGNOSIS — I10 ESSENTIAL HYPERTENSION: ICD-10-CM

## 2019-11-12 DIAGNOSIS — E78.5 HYPERLIPIDEMIA, UNSPECIFIED HYPERLIPIDEMIA TYPE: ICD-10-CM

## 2019-11-12 LAB
ALBUMIN SERPL BCP-MCNC: 3.8 G/DL (ref 3.5–5.2)
ALP SERPL-CCNC: 60 U/L (ref 55–135)
ALT SERPL W/O P-5'-P-CCNC: 47 U/L (ref 10–44)
ANION GAP SERPL CALC-SCNC: 7 MMOL/L (ref 8–16)
AST SERPL-CCNC: 37 U/L (ref 10–40)
BILIRUB SERPL-MCNC: 1.4 MG/DL (ref 0.1–1)
BUN SERPL-MCNC: 14 MG/DL (ref 8–23)
CALCIUM SERPL-MCNC: 9.5 MG/DL (ref 8.7–10.5)
CHLORIDE SERPL-SCNC: 98 MMOL/L (ref 95–110)
CHOLEST SERPL-MCNC: 149 MG/DL (ref 120–199)
CHOLEST/HDLC SERPL: 3.2 {RATIO} (ref 2–5)
CO2 SERPL-SCNC: 31 MMOL/L (ref 23–29)
CREAT SERPL-MCNC: 0.9 MG/DL (ref 0.5–1.4)
EST. GFR  (AFRICAN AMERICAN): >60 ML/MIN/1.73 M^2
EST. GFR  (NON AFRICAN AMERICAN): >60 ML/MIN/1.73 M^2
GLUCOSE SERPL-MCNC: 99 MG/DL (ref 70–110)
HDLC SERPL-MCNC: 47 MG/DL (ref 40–75)
HDLC SERPL: 31.5 % (ref 20–50)
LDLC SERPL CALC-MCNC: 84.4 MG/DL (ref 63–159)
NONHDLC SERPL-MCNC: 102 MG/DL
POTASSIUM SERPL-SCNC: 4 MMOL/L (ref 3.5–5.1)
PROT SERPL-MCNC: 6.8 G/DL (ref 6–8.4)
SODIUM SERPL-SCNC: 136 MMOL/L (ref 136–145)
TRIGL SERPL-MCNC: 88 MG/DL (ref 30–150)

## 2019-11-12 PROCEDURE — 80053 COMPREHEN METABOLIC PANEL: CPT

## 2019-11-12 PROCEDURE — 36415 COLL VENOUS BLD VENIPUNCTURE: CPT | Mod: PO

## 2019-11-12 PROCEDURE — 80061 LIPID PANEL: CPT

## 2019-11-12 RX ORDER — ATORVASTATIN CALCIUM 40 MG/1
TABLET, FILM COATED ORAL
Qty: 90 TABLET | Refills: 3 | Status: SHIPPED | OUTPATIENT
Start: 2019-11-12 | End: 2020-04-02 | Stop reason: SDUPTHER

## 2019-11-19 ENCOUNTER — OFFICE VISIT (OUTPATIENT)
Dept: FAMILY MEDICINE | Facility: CLINIC | Age: 74
End: 2019-11-19
Payer: MEDICARE

## 2019-11-19 VITALS
DIASTOLIC BLOOD PRESSURE: 78 MMHG | HEIGHT: 67 IN | SYSTOLIC BLOOD PRESSURE: 132 MMHG | WEIGHT: 190.06 LBS | RESPIRATION RATE: 18 BRPM | OXYGEN SATURATION: 95 % | BODY MASS INDEX: 29.83 KG/M2 | HEART RATE: 63 BPM | TEMPERATURE: 98 F

## 2019-11-19 DIAGNOSIS — E78.5 HYPERLIPIDEMIA, UNSPECIFIED HYPERLIPIDEMIA TYPE: ICD-10-CM

## 2019-11-19 DIAGNOSIS — J30.0 VASOMOTOR RHINITIS: Primary | ICD-10-CM

## 2019-11-19 DIAGNOSIS — I10 ESSENTIAL HYPERTENSION: ICD-10-CM

## 2019-11-19 DIAGNOSIS — R06.2 WHEEZING: ICD-10-CM

## 2019-11-19 PROCEDURE — 99214 OFFICE O/P EST MOD 30 MIN: CPT | Mod: S$GLB,,, | Performed by: INTERNAL MEDICINE

## 2019-11-19 PROCEDURE — 99214 PR OFFICE/OUTPT VISIT, EST, LEVL IV, 30-39 MIN: ICD-10-PCS | Mod: S$GLB,,, | Performed by: INTERNAL MEDICINE

## 2019-11-19 RX ORDER — IPRATROPIUM BROMIDE 42 UG/1
2 SPRAY, METERED NASAL 4 TIMES DAILY
Qty: 15 ML | Refills: 5 | Status: SHIPPED | OUTPATIENT
Start: 2019-11-19 | End: 2020-03-30 | Stop reason: SDUPTHER

## 2019-11-19 RX ORDER — ALBUTEROL SULFATE 90 UG/1
2 AEROSOL, METERED RESPIRATORY (INHALATION) EVERY 6 HOURS PRN
Qty: 1 EACH | Refills: 11 | Status: SHIPPED | OUTPATIENT
Start: 2019-11-19 | End: 2023-01-02

## 2019-11-19 RX ORDER — FLUTICASONE PROPIONATE 50 MCG
2 SPRAY, SUSPENSION (ML) NASAL DAILY
Qty: 16 G | Refills: 11 | Status: SHIPPED | OUTPATIENT
Start: 2019-11-19 | End: 2020-03-30 | Stop reason: SDUPTHER

## 2019-11-19 NOTE — PROGRESS NOTES
Subjective:      8:18 AM     Patient ID: Michael Ann Jr. is a 74 y.o. male.    Chief Complaint: No chief complaint on file.    HPI     CHIEF COMPLAINT: Sinusitis(+).  HPI:  Nasal congestion and rhinorrhea with temperature changes such as going outside.    ONSET/TIMING:  Onset      2 months  ago. Similar problems in past:  Yes. .   DURATION:   continuous    QUALITY/COURSE:    unchanged    LOCATION:  Facial/Sinus pain: bilateral. .     INTENSITY/SEVERITY: # 5 / 10 (on 1 to 10 scale)     The following symptoms/statements  are positive if BOLD, negative otherwise.      CONTEXT/WHEN: . Similar_problems.. Seasonal_pattern. Allergies/Hayfever.  Tobacco_use. .  Irritant_Exposure(smoke/dust/fumes). . Exposure_to_others_with_similar_symptoms. .               MODIFIERS/TREATMENTS: Antihistamines: . Rx-Nasal_Spray: . OTC Nasal Spray Use. Decongestants. . Antibiotics. -.      SYMPTOMS/RELATED:  Sinus_pain. .Rhinorrhea/Purulent. .    Dentalgia. Lymphadenopathy.  Swelling-Neck.     Patient has noticed that he is wheezing little bit.  At the end of expiration.  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      CHIEF COMPLAINT: Hypertension  HPI:     ONSET:      QUALITY/COURSE:   Unchanged.     INTENSITY/SEVERITY:  Average blood pressure is 120/80.      MODIFIERS/TREATMENTS:  Taking medications: yes. .High sodium intake: no. alcohol: no      The following symptoms are positive only if BOLDED, otherwise are negative.      SYMPTOMS/RELATED: Possible medication side effects include:   Depression..  . Cough. . Constipation.    REVIEW OF SYMPTOMS: . Weight_loss . Weight_gain . Leg_cramps .Potency_problems .    TARGET ORGAN DAMAGE:: angina/ prior myocardial infarction, chronic kidney disease, heart failure, left ventricular hypertrophy, peripheral artery disease, prior coronary revascularization, retinopathy, stroke. transient ischemic attack.        CHIEF COMPLAINT: Hyperlipidemia. cholesterol screening: no.   HPI:     ONSET:     "MODIFIERS/TREATMENTS: . Taking medications: yes. . Non-compliance with following diet: no. .     SYMPTOMS/RELATED:Possible medication side effects include:   Myalgia: no.  .     REVIEW OF SYMPTOMS: past weights:   Wt Readings from Last 1 Encounters:   11/19/19 0815 86.2 kg (190 lb 0.6 oz)                                                     Last lipids: total   Lab Results   Component Value Date    CHOL 149 11/12/2019    CHOL 158 05/14/2019    CHOL 175 11/08/2018                                                                     HDL   Lab Results   Component Value Date    HDL 47 11/12/2019    HDL 45 05/14/2019    HDL 41 11/08/2018                                                                     LDL   Lab Results   Component Value Date    LDLCALC 84.4 11/12/2019    LDLCALC 90.4 05/14/2019    LDLCALC 109.2 11/08/2018                                                                     TRIG   Lab Results   Component Value Date    TRIG 88 11/12/2019    TRIG 113 05/14/2019    TRIG 124 11/08/2018                                                                         Review of Systems      Objective:      Vitals:    11/19/19 0815   BP: 132/78   Pulse: 63   Resp: 18   Temp: 97.8 °F (36.6 °C)   TempSrc: Oral   SpO2: 95%   Weight: 86.2 kg (190 lb 0.6 oz)   Height: 5' 7" (1.702 m)     Physical Exam   Constitutional: He appears well-developed and well-nourished.   Cardiovascular: Normal rate, regular rhythm and normal heart sounds.   Pulmonary/Chest: Effort normal and breath sounds normal.   Abdominal: Soft. There is no tenderness.   Musculoskeletal: He exhibits edema (1+ bilaterally).   Neurological: He is alert.   Psychiatric: He has a normal mood and affect. His behavior is normal. Thought content normal.   Nursing note and vitals reviewed.        Assessment:       1. Vasomotor rhinitis    2. Essential hypertension    3. Hyperlipidemia, unspecified hyperlipidemia type    4. Wheezing          Plan:   Patient has had a " normal stress test in the past.    Vasomotor rhinitis  -     fluticasone propionate (FLONASE) 50 mcg/actuation nasal spray; 2 sprays (100 mcg total) by Each Nostril route once daily.  Dispense: 16 g; Refill: 11  -     ipratropium (ATROVENT) 42 mcg (0.06 %) nasal spray; 2 sprays by Nasal route 4 (four) times daily.  Dispense: 15 mL; Refill: 5    Essential hypertension  -     Comprehensive metabolic panel; Future; Expected date: 11/19/2019    Hyperlipidemia, unspecified hyperlipidemia type  -     Lipid panel; Future; Expected date: 11/19/2019    Wheezing  -     Complete PFT with bronchodilator; Future  -     albuterol (PROVENTIL/VENTOLIN HFA) 90 mcg/actuation inhaler; Inhale 2 puffs into the lungs every 6 (six) hours as needed for Wheezing.  Dispense: 1 each; Refill: 11      Follow up in about 6 months (around 5/19/2020).

## 2019-11-19 NOTE — PATIENT INSTRUCTIONS
Wash your nose out with saline before using the Flonase and the Atrovent nasal sprays.    Do not put the Ventolin in your mouth, rather a hand breath away. .  Breathe in slowly.  5 min between puffs. .        Thank you for choosing [facility].     Please fill out the patient experience survey.

## 2019-12-14 DIAGNOSIS — R60.0 BILATERAL LEG EDEMA: ICD-10-CM

## 2019-12-14 DIAGNOSIS — I10 ESSENTIAL HYPERTENSION: ICD-10-CM

## 2019-12-16 RX ORDER — TRIAMTERENE/HYDROCHLOROTHIAZID 37.5-25 MG
TABLET ORAL
Qty: 90 TABLET | Refills: 1 | Status: SHIPPED | OUTPATIENT
Start: 2019-12-16 | End: 2020-03-30 | Stop reason: SDUPTHER

## 2020-01-10 ENCOUNTER — OFFICE VISIT (OUTPATIENT)
Dept: FAMILY MEDICINE | Facility: CLINIC | Age: 75
End: 2020-01-10
Payer: MEDICARE

## 2020-01-10 VITALS
HEART RATE: 75 BPM | SYSTOLIC BLOOD PRESSURE: 138 MMHG | DIASTOLIC BLOOD PRESSURE: 68 MMHG | RESPIRATION RATE: 19 BRPM | BODY MASS INDEX: 29.51 KG/M2 | TEMPERATURE: 98 F | OXYGEN SATURATION: 95 % | HEIGHT: 67 IN | WEIGHT: 188 LBS

## 2020-01-10 DIAGNOSIS — J20.9 ACUTE BRONCHITIS, UNSPECIFIED ORGANISM: ICD-10-CM

## 2020-01-10 PROCEDURE — 99213 PR OFFICE/OUTPT VISIT, EST, LEVL III, 20-29 MIN: ICD-10-PCS | Mod: S$GLB,,, | Performed by: NURSE PRACTITIONER

## 2020-01-10 PROCEDURE — 99213 OFFICE O/P EST LOW 20 MIN: CPT | Mod: S$GLB,,, | Performed by: NURSE PRACTITIONER

## 2020-01-10 RX ORDER — PROMETHAZINE HYDROCHLORIDE AND CODEINE PHOSPHATE 6.25; 1 MG/5ML; MG/5ML
5 SOLUTION ORAL EVERY 6 HOURS PRN
Qty: 118 ML | Refills: 0 | Status: SHIPPED | OUTPATIENT
Start: 2020-01-10 | End: 2020-01-17

## 2020-01-10 RX ORDER — PREDNISONE 20 MG/1
20 TABLET ORAL DAILY
Qty: 5 TABLET | Refills: 0 | Status: SHIPPED | OUTPATIENT
Start: 2020-01-10 | End: 2020-01-15

## 2020-01-10 NOTE — PROGRESS NOTES
Subjective:       Patient ID: Michael Ann Jr. is a 74 y.o. male.    Chief Complaint: shortness of breath, cough, congestion, sinus problem    Cough   This is a new problem. The current episode started in the past 7 days (started 5 days ago). The problem occurs every few minutes. The cough is productive of sputum. Associated symptoms include nasal congestion. Pertinent negatives include no fever, myalgias, rhinorrhea, sore throat or shortness of breath. Nothing aggravates the symptoms. He has tried OTC cough suppressant (nyquil, benadryl) for the symptoms. The treatment provided mild relief. There is no history of asthma or COPD.     Review of Systems   Constitutional: Negative for fever.   HENT: Negative for rhinorrhea and sore throat.    Respiratory: Positive for cough. Negative for shortness of breath.    Musculoskeletal: Negative for myalgias.       Objective:      Physical Exam   Constitutional: He is oriented to person, place, and time. He appears well-developed and well-nourished. No distress.   HENT:   Head: Normocephalic and atraumatic.   Right Ear: External ear normal.   Left Ear: External ear normal.   Mouth/Throat: Oropharynx is clear and moist. No oropharyngeal exudate.   TMs pearly grey with light reflex bilaterally.      Eyes: Conjunctivae are normal. Right eye exhibits no discharge. Left eye exhibits no discharge. No scleral icterus.   Neck: Normal range of motion. Neck supple.   Cardiovascular: Normal rate, regular rhythm and normal heart sounds. Exam reveals no gallop and no friction rub.   No murmur heard.  Pulmonary/Chest: Effort normal and breath sounds normal. No stridor. No respiratory distress. He has no wheezes. He has no rales.   Musculoskeletal: He exhibits no edema.   Lymphadenopathy:     He has no cervical adenopathy.   Neurological: He is alert and oriented to person, place, and time.   Skin: Skin is warm and dry. No rash noted. He is not diaphoretic. No pallor.    Psychiatric: He has a normal mood and affect. His behavior is normal.   Nursing note and vitals reviewed.      Assessment:       1. Acute bronchitis, unspecified organism        Plan:     Acute bronchitis, unspecified organism  -     promethazine-codeine 6.25-10 mg/5 ml (PHENERGAN WITH CODEINE) 6.25-10 mg/5 mL syrup; Take 5 mLs by mouth every 6 (six) hours as needed for Cough.  Dispense: 118 mL; Refill: 0  -     predniSONE (DELTASONE) 20 MG tablet; Take 1 tablet (20 mg total) by mouth once daily. for 5 days  Dispense: 5 tablet; Refill: 0         mucinex is fine to use but not with a decongestant in it, may use generic tussin DM.  Stop the benedryl, may use zyrtec, xyzol, claritin or allegra (just the plain one, not the one with a decongestant).

## 2020-03-13 ENCOUNTER — OFFICE VISIT (OUTPATIENT)
Dept: FAMILY MEDICINE | Facility: CLINIC | Age: 75
End: 2020-03-13
Payer: MEDICARE

## 2020-03-13 VITALS
WEIGHT: 190.06 LBS | HEIGHT: 67 IN | SYSTOLIC BLOOD PRESSURE: 120 MMHG | HEART RATE: 100 BPM | RESPIRATION RATE: 16 BRPM | OXYGEN SATURATION: 95 % | DIASTOLIC BLOOD PRESSURE: 80 MMHG | TEMPERATURE: 98 F | BODY MASS INDEX: 29.83 KG/M2

## 2020-03-13 DIAGNOSIS — K21.9 GASTROESOPHAGEAL REFLUX DISEASE, ESOPHAGITIS PRESENCE NOT SPECIFIED: ICD-10-CM

## 2020-03-13 DIAGNOSIS — R10.13 EPIGASTRIC PAIN: Primary | ICD-10-CM

## 2020-03-13 DIAGNOSIS — R14.3 FLATUS: ICD-10-CM

## 2020-03-13 PROCEDURE — 99214 PR OFFICE/OUTPT VISIT, EST, LEVL IV, 30-39 MIN: ICD-10-PCS | Mod: S$GLB,,, | Performed by: INTERNAL MEDICINE

## 2020-03-13 PROCEDURE — 93010 ELECTROCARDIOGRAM REPORT: CPT | Mod: S$GLB,,, | Performed by: INTERNAL MEDICINE

## 2020-03-13 PROCEDURE — 93005 EKG 12-LEAD: ICD-10-PCS | Mod: S$GLB,,, | Performed by: INTERNAL MEDICINE

## 2020-03-13 PROCEDURE — 93010 EKG 12-LEAD: ICD-10-PCS | Mod: S$GLB,,, | Performed by: INTERNAL MEDICINE

## 2020-03-13 PROCEDURE — 93005 ELECTROCARDIOGRAM TRACING: CPT | Mod: S$GLB,,, | Performed by: INTERNAL MEDICINE

## 2020-03-13 PROCEDURE — 99214 OFFICE O/P EST MOD 30 MIN: CPT | Mod: S$GLB,,, | Performed by: INTERNAL MEDICINE

## 2020-03-13 RX ORDER — MISOPROSTOL 100 UG/1
100 TABLET ORAL 2 TIMES DAILY WITH MEALS
Qty: 60 TABLET | Refills: 11 | Status: SHIPPED | OUTPATIENT
Start: 2020-03-13 | End: 2020-03-30 | Stop reason: SDUPTHER

## 2020-03-13 RX ORDER — PANTOPRAZOLE SODIUM 40 MG/1
40 TABLET, DELAYED RELEASE ORAL DAILY
Qty: 30 TABLET | Refills: 11 | Status: SHIPPED | OUTPATIENT
Start: 2020-03-13 | End: 2020-03-30 | Stop reason: SDUPTHER

## 2020-03-13 NOTE — PROGRESS NOTES
Subjective:      11:17 AM     Patient ID: Michael Ann Jr. is a 74 y.o. male.    Chief Complaint: Abdominal Pain (no refills needed) and gas/bloating    HPI     Abdominal Pain.    ONSET:   6 months  ago.  Belching for year.  Has chronic back pain is been taking a lot of Naprosyn   DURATION:  An hour    QUALITY/COURSE: . unchanged    LOCATION:   Epigastric  .--Radiation:      INTENSITY/SEVERITY: .Severity is #   3  (10 point scale).  Character:  Discomfort   CONTEXT/WHEN:  Improved during the meal but comes on afterwards.  Some reflux on with certain foods..--Similar problems: no. Trauma: no.    The following symptoms/statements  are positive if BOLD, negative otherwise.    AGGRAVATING FACTORS: food . medications. alcohol. movement. position . bowel movements . emotional stress .  RELIEF WITH: food or milk, antacids . medications .  position . bowel movements . Eructation.  passing gas .  PAST TREATMENT OR EVALUATION: barium+_enema . Upper_gastrointestinal_series . CT_scans . sonograms . Endoscopic_procedures .  ASSOCIATED SYMPTOMS:      Weight_loss . jaundice .   FEMALE: relation_of_pain_to_menstrual_periods .      . Possibility_of_pregnancy . . dyspareunia .  HISTORY OF: Diabetes. CAD. prior abdomina surgery. Kidney_stones.  Gallbladder_disease. Hiatal_hernia. Peptic_ulcer. colitis. Liver_disease.  FH  Colitis . . enteritis .     Last labs:  Lab Results   Component Value Date    WBC 6.57 05/14/2019    HGB 16.8 05/14/2019    HCT 50.2 05/14/2019     05/14/2019    CHOL 149 11/12/2019    TRIG 88 11/12/2019    HDL 47 11/12/2019    ALT 47 (H) 11/12/2019    AST 37 11/12/2019     11/12/2019    K 4.0 11/12/2019    CL 98 11/12/2019    CREATININE 0.9 11/12/2019    BUN 14 11/12/2019    CO2 31 (H) 11/12/2019    TSH 1.498 10/02/2017    PSA <0.01 08/09/2016                     Review of Systems      Objective:      Vitals:    03/13/20 1052   BP: 120/80   Pulse: 100   Resp: 16   Temp: 97.8 °F (36.6 °C)  "  TempSrc: Oral   SpO2: 95%   Weight: 86.2 kg (190 lb 0.6 oz)   Height: 5' 7" (1.702 m)   PainSc:   3   PainLoc: Abdomen     Physical Exam   Constitutional: He appears well-developed and well-nourished.   Cardiovascular: Normal rate, regular rhythm and normal heart sounds.   Pulmonary/Chest: Effort normal and breath sounds normal.   Abdominal: Soft. There is tenderness (Mild epigastric tenderness, Robertson sign negative).   Neurological: He is alert.   Psychiatric: He has a normal mood and affect. His behavior is normal. Thought content normal.   Nursing note and vitals reviewed.        Assessment:       1. Epigastric pain    2. Flatus    3. Gastroesophageal reflux disease, esophagitis presence not specified          Plan:       Epigastric pain  -     pantoprazole (PROTONIX) 40 MG tablet; Take 1 tablet (40 mg total) by mouth once daily.  Dispense: 30 tablet; Refill: 11  -     miSOPROStoL (CYTOTEC) 100 MCG Tab; Take 1 tablet (100 mcg total) by mouth 2 (two) times daily with meals.  Dispense: 60 tablet; Refill: 11  -     EKG 12-lead; Future  -     Ambulatory referral/consult to Gastroenterology; Future; Expected date: 03/20/2020    Flatus    Gastroesophageal reflux disease, esophagitis presence not specified      Follow up in about 6 weeks (around 4/24/2020).      "

## 2020-03-13 NOTE — PATIENT INSTRUCTIONS
Take the Naprosyn with food along with Cytotec    Protonix should be taken 0.5 hr before you eat once a day.    No alcohol    No dairy products for 2 weeks.  If that work he can take some dairy products with Lactaid.    No non-leafy vegetables for 2 weeks.  If that works you can take some of these vegetables with Beano.    Thank you for choosing [facility].     Please fill out the patient experience survey.

## 2020-03-30 DIAGNOSIS — J30.0 VASOMOTOR RHINITIS: ICD-10-CM

## 2020-03-30 DIAGNOSIS — R60.0 BILATERAL LEG EDEMA: ICD-10-CM

## 2020-03-30 DIAGNOSIS — R10.13 EPIGASTRIC PAIN: ICD-10-CM

## 2020-03-30 DIAGNOSIS — G62.9 PERIPHERAL POLYNEUROPATHY: ICD-10-CM

## 2020-03-30 DIAGNOSIS — I10 ESSENTIAL HYPERTENSION: ICD-10-CM

## 2020-03-30 RX ORDER — MISOPROSTOL 100 UG/1
100 TABLET ORAL 2 TIMES DAILY WITH MEALS
Qty: 180 TABLET | Refills: 1 | Status: SHIPPED | OUTPATIENT
Start: 2020-03-30 | End: 2021-04-19

## 2020-03-30 RX ORDER — TRIAMTERENE/HYDROCHLOROTHIAZID 37.5-25 MG
1 TABLET ORAL DAILY
Qty: 90 TABLET | Refills: 1 | Status: SHIPPED | OUTPATIENT
Start: 2020-03-30 | End: 2020-08-21

## 2020-03-30 RX ORDER — FLUTICASONE PROPIONATE 50 MCG
2 SPRAY, SUSPENSION (ML) NASAL DAILY
Qty: 16 G | Refills: 11 | Status: SHIPPED | OUTPATIENT
Start: 2020-03-30 | End: 2023-01-02

## 2020-03-30 RX ORDER — PANTOPRAZOLE SODIUM 40 MG/1
40 TABLET, DELAYED RELEASE ORAL DAILY
Qty: 90 TABLET | Refills: 1 | Status: SHIPPED | OUTPATIENT
Start: 2020-03-30 | End: 2020-08-21

## 2020-03-30 RX ORDER — IPRATROPIUM BROMIDE 42 UG/1
2 SPRAY, METERED NASAL 4 TIMES DAILY
Qty: 15 ML | Refills: 5 | Status: SHIPPED | OUTPATIENT
Start: 2020-03-30 | End: 2023-01-02

## 2020-03-30 RX ORDER — GABAPENTIN 100 MG/1
CAPSULE ORAL
Qty: 450 CAPSULE | Refills: 1 | Status: SHIPPED | OUTPATIENT
Start: 2020-03-30 | End: 2020-10-19

## 2020-04-02 DIAGNOSIS — I10 ESSENTIAL HYPERTENSION: ICD-10-CM

## 2020-04-02 NOTE — TELEPHONE ENCOUNTER
----- Message from Yara Cruz sent at 4/2/2020  9:31 AM CDT -----  Contact: patient spouse elenita [phone]  Patient spouse elenita called on patient behalf requesting a refill on atorvastatin and amlodipine.    Patient will be using [facility], ph#[phone] and fax [phone].     Please call patient spouse ph# at [phone].     Thanks!

## 2020-04-03 RX ORDER — ATORVASTATIN CALCIUM 40 MG/1
40 TABLET, FILM COATED ORAL DAILY
Qty: 90 TABLET | Refills: 3 | Status: SHIPPED | OUTPATIENT
Start: 2020-04-03 | End: 2021-02-05

## 2020-04-03 RX ORDER — AMLODIPINE BESYLATE 2.5 MG/1
2.5 TABLET ORAL DAILY
Qty: 90 TABLET | Refills: 3 | Status: SHIPPED | OUTPATIENT
Start: 2020-04-03 | End: 2021-02-05

## 2020-05-26 ENCOUNTER — TELEPHONE (OUTPATIENT)
Dept: ENDOSCOPY | Facility: HOSPITAL | Age: 75
End: 2020-05-26

## 2020-05-26 NOTE — TELEPHONE ENCOUNTER
Called to schedule colonoscopy. States not ready due to covid. Instructed to call [phone] when ready to schedule

## 2020-07-23 ENCOUNTER — PATIENT OUTREACH (OUTPATIENT)
Dept: ADMINISTRATIVE | Facility: HOSPITAL | Age: 75
End: 2020-07-23

## 2020-07-23 DIAGNOSIS — Z12.11 SCREENING FOR COLORECTAL CANCER: Primary | ICD-10-CM

## 2020-07-23 DIAGNOSIS — Z12.12 SCREENING FOR COLORECTAL CANCER: Primary | ICD-10-CM

## 2020-07-23 NOTE — PROGRESS NOTES
Chart review completed 2020.  Care Everywhere updates requested and reviewed.  Immunizations reconciled. Media reports reviewed.  Duplicate HM overrides and  orders removed.  Overdue HM topic chart audit and/or requested.  Overdue lab testing linked to upcoming lab appointments if applies.    WOG orders placed. FITKIT  Letter mailed.  YES      Health Maintenance Due   Topic Date Due    Shingles Vaccine (1 of 2) 1997    Colorectal Cancer Screening  2019

## 2020-07-23 NOTE — LETTER
"July 23, 2020    Michael Duvall Marissa Friedman  [place] [address]             [facility] [address] [facility] [address]  Phone: [phone] [facility] is committed to your overall health.  To help you get the most out of each of your visits, we will review your information to make sure you are up to date on all of your recommended tests and/or procedures.      Dr. Daniel Baptiset MD has found that your chart shows you may be due for a:    COLORECTAL CANCER SCREENING     If you have had any of the above done at another facility, please bring the records or information with you so that your record at [facility] will be complete.  If you would like to schedule any of these, please contact the clinic at [phone].    Our records show you are due for colon cancer screening.  If you have already had your screening, or you have made an appointment for your screening, congratulations!  You're on the road to good health. If you haven't signed up for a colorectal screening please accept this invitation to be screened.      According to the American Cancer Society, colon cancer is the third most common cancer for people in the [place].  A simple screening test "Fit Kit" - done in your own home - can help find colon cancer at an early stage when it can be treated, even before any signs or symptoms develop. THIS IS A YEARLY TEST.    Testing for blood in your stool (feces or bowel movement) is the first step. If you have an upcoming visit with your Primary Care Physician you can  a Fit Kit during your visit or you can  a Fit Kit at your Primary Care Clinic prior to your visit.    The Fit Kit includes:    · Instructions on how to perform the test  · (1) Sheet of tissue paper  · (1) Small Absorption pad  · (1) Bottle to hold the sample and a small probe to help you take the sample  · (1) Small plastic bio-hazard bag  · (1) Postage-paid return envelope    Please do your " "test (the instructions will tell you how) and then return your sample in the postage-paid return envelope within 24 hours of collection.     If your test results are negative, you won't need testing again for another year.  If results show you need more testing, we will call you with next steps.    Regular colorectal cancer screening is one of the most powerful weapons for preventing colon cancer.  The website https://www.cancer.org/cancer/colon-rectal-cancer.html can answer many of your questions about this cancer and its prevention.  Just search for "colorectal cancer".      If you are currently taking medication, please bring it with you to your appointment for review.    Also, if you have any type of Advanced Directives, please bring them with you to your office visit so we may scan them into your chart.    Thank You,    Your [facility],  MD Natalie Chappell LPN Clinical Care Coordinator  [facility] [address] [place] [address]  Phone [phone]  Fax [phone]       "

## 2020-07-27 ENCOUNTER — HOSPITAL ENCOUNTER (OUTPATIENT)
Dept: PULMONOLOGY | Facility: HOSPITAL | Age: 75
Discharge: HOME | End: 2020-07-27
Attending: INTERNAL MEDICINE
Payer: MEDICARE

## 2020-07-27 DIAGNOSIS — R06.2 WHEEZING: ICD-10-CM

## 2020-07-27 PROCEDURE — 94060 EVALUATION OF WHEEZING: CPT

## 2020-07-27 PROCEDURE — 94729 PR C02/MEMBANE DIFFUSE CAPACITY: ICD-10-PCS | Mod: 26,,, | Performed by: INTERNAL MEDICINE

## 2020-07-27 PROCEDURE — 94060 PR EVAL OF BRONCHOSPASM: ICD-10-PCS | Mod: 26,,, | Performed by: INTERNAL MEDICINE

## 2020-07-27 PROCEDURE — 94060 EVALUATION OF WHEEZING: CPT | Mod: 26,,, | Performed by: INTERNAL MEDICINE

## 2020-07-27 PROCEDURE — 94727 PR PULM FUNCTION TEST BY GAS: ICD-10-PCS | Mod: 26,,, | Performed by: INTERNAL MEDICINE

## 2020-07-27 PROCEDURE — 94729 DIFFUSING CAPACITY: CPT

## 2020-07-27 PROCEDURE — 94727 GAS DIL/WSHOT DETER LNG VOL: CPT

## 2020-07-27 PROCEDURE — 94727 GAS DIL/WSHOT DETER LNG VOL: CPT | Mod: 26,,, | Performed by: INTERNAL MEDICINE

## 2020-07-27 PROCEDURE — 94729 DIFFUSING CAPACITY: CPT | Mod: 26,,, | Performed by: INTERNAL MEDICINE

## 2020-07-28 LAB
BRPFT: ABNORMAL
DLCO ADJ PRE: 15.41 ML/(MIN*MMHG) (ref 18.07–31.93)
DLCO SINGLE BREATH LLN: 18.07
DLCO SINGLE BREATH PRE REF: 61.6 %
DLCO SINGLE BREATH REF: 25
DLCOC SBVA LLN: 2.5
DLCOC SBVA PRE REF: 95.3 %
DLCOC SBVA REF: 3.71
DLCOC SINGLE BREATH LLN: 18.07
DLCOC SINGLE BREATH PRE REF: 61.6 %
DLCOC SINGLE BREATH REF: 25
DLCOVA LLN: 2.5
DLCOVA PRE REF: 95.3 %
DLCOVA PRE: 3.53 ML/(MIN*MMHG*L) (ref 2.5–4.92)
DLCOVA REF: 3.71
DLVAADJ PRE: 3.53 ML/(MIN*MMHG*L) (ref 2.5–4.92)
ERVN2 LLN: -16449.03
ERVN2 PRE REF: 17 %
ERVN2 PRE: 0.17 L (ref -16449.03–16450.97)
ERVN2 REF: 0.97
FEF 25 75 CHG: -6.3 %
FEF 25 75 LLN: 0.91
FEF 25 75 POST REF: 70.1 %
FEF 25 75 PRE REF: 74.8 %
FEF 25 75 REF: 2.19
FET100 CHG: -6.9 %
FEV1 CHG: -6.8 %
FEV1 FVC CHG: -0.2 %
FEV1 FVC LLN: 62
FEV1 FVC POST REF: 97.6 %
FEV1 FVC PRE REF: 97.9 %
FEV1 FVC REF: 76
FEV1 LLN: 2.08
FEV1 POST REF: 70.6 %
FEV1 PRE REF: 75.7 %
FEV1 REF: 2.92
FRCN2 LLN: 2.63
FRCN2 PRE REF: 49.7 %
FRCN2 REF: 3.62
FVC CHG: -6.6 %
FVC LLN: 2.84
FVC POST REF: 71.9 %
FVC PRE REF: 77 %
FVC REF: 3.86
IVC PRE: 2.48 L (ref 2.84–4.88)
IVC SINGLE BREATH LLN: 2.84
IVC SINGLE BREATH PRE REF: 64.1 %
IVC SINGLE BREATH REF: 3.86
MVV LLN: 96
MVV PRE REF: 75 %
MVV REF: 113
PEF CHG: -3 %
PEF LLN: 5.44
PEF POST REF: 81.1 %
PEF PRE REF: 83.6 %
PEF REF: 7.63
POST FEF 25 75: 1.53 L/S (ref 0.91–3.47)
POST FET 100: 8.14 SEC
POST FEV1 FVC: 74.05 % (ref 61.93–89.75)
POST FEV1: 2.06 L (ref 2.08–3.75)
POST FVC: 2.78 L (ref 2.84–4.88)
POST PEF: 6.19 L/S (ref 5.44–9.82)
PRE DLCO: 15.41 ML/(MIN*MMHG) (ref 18.07–31.93)
PRE FEF 25 75: 1.64 L/S (ref 0.91–3.47)
PRE FET 100: 8.74 SEC
PRE FEV1 FVC: 74.22 % (ref 61.93–89.75)
PRE FEV1: 2.21 L (ref 2.08–3.75)
PRE FRC N2: 1.8 L
PRE FVC: 2.98 L (ref 2.84–4.88)
PRE MVV: 85 L/MIN (ref 96.29–130.27)
PRE PEF: 6.38 L/S (ref 5.44–9.82)
RVN2 LLN: 1.97
RVN2 PRE REF: 59.9 %
RVN2 PRE: 1.58 L (ref 1.97–3.32)
RVN2 REF: 2.64
RVN2TLCN2 LLN: 33.45
RVN2TLCN2 PRE REF: 81.8 %
RVN2TLCN2 PRE: 34.72 % (ref 33.45–51.41)
RVN2TLCN2 REF: 42.43
TLCN2 LLN: 5.59
TLCN2 PRE REF: 67.6 %
TLCN2 PRE: 4.56 L (ref 5.59–7.89)
TLCN2 REF: 6.74
VA PRE: 4.36 L (ref 6.59–6.59)
VA SINGLE BREATH LLN: 6.59
VA SINGLE BREATH PRE REF: 66.2 %
VA SINGLE BREATH REF: 6.59
VCMAXN2 LLN: 2.84
VCMAXN2 PRE REF: 77 %
VCMAXN2 PRE: 2.98 L (ref 2.84–4.88)
VCMAXN2 REF: 3.86

## 2020-07-28 NOTE — PROGRESS NOTES
Normal study    This was reviewed by Dr. Baptiste.  If you have any questions needs or problems let us know

## 2020-08-06 ENCOUNTER — TELEPHONE (OUTPATIENT)
Dept: FAMILY MEDICINE | Facility: CLINIC | Age: 75
End: 2020-08-06

## 2020-08-06 ENCOUNTER — OFFICE VISIT (OUTPATIENT)
Dept: FAMILY MEDICINE | Facility: CLINIC | Age: 75
End: 2020-08-06
Payer: MEDICARE

## 2020-08-06 ENCOUNTER — TELEPHONE (OUTPATIENT)
Dept: NEUROLOGY | Facility: CLINIC | Age: 75
End: 2020-08-06

## 2020-08-06 ENCOUNTER — TELEPHONE (OUTPATIENT)
Dept: PHYSICAL MEDICINE AND REHAB | Facility: CLINIC | Age: 75
End: 2020-08-06

## 2020-08-06 VITALS
TEMPERATURE: 98 F | OXYGEN SATURATION: 95 % | SYSTOLIC BLOOD PRESSURE: 110 MMHG | BODY MASS INDEX: 29.17 KG/M2 | RESPIRATION RATE: 16 BRPM | WEIGHT: 185.88 LBS | HEIGHT: 67 IN | DIASTOLIC BLOOD PRESSURE: 80 MMHG | HEART RATE: 66 BPM

## 2020-08-06 DIAGNOSIS — R53.82 CHRONIC FATIGUE AND MALAISE: Primary | ICD-10-CM

## 2020-08-06 DIAGNOSIS — I10 ESSENTIAL HYPERTENSION: ICD-10-CM

## 2020-08-06 DIAGNOSIS — M21.372 LEFT FOOT DROP: Primary | ICD-10-CM

## 2020-08-06 DIAGNOSIS — R60.0 LEG EDEMA, LEFT: ICD-10-CM

## 2020-08-06 DIAGNOSIS — R53.81 CHRONIC FATIGUE AND MALAISE: Primary | ICD-10-CM

## 2020-08-06 PROCEDURE — 99214 OFFICE O/P EST MOD 30 MIN: CPT | Mod: S$GLB,,, | Performed by: INTERNAL MEDICINE

## 2020-08-06 PROCEDURE — 99214 PR OFFICE/OUTPT VISIT, EST, LEVL IV, 30-39 MIN: ICD-10-PCS | Mod: S$GLB,,, | Performed by: INTERNAL MEDICINE

## 2020-08-06 NOTE — PROGRESS NOTES
Subjective:      8:50 AM     Patient ID: Michael Ann Jr. is a 75 y.o. male.    Chief Complaint: Hyperlipidemia (labs,no refills needed)    HPI       CHIEF COMPLAINT: Hyperlipidemia. cholesterol screening: no.   HPI:     ONSET:    MODIFIERS/TREATMENTS: . Taking medications: yes. . Non-compliance with following diet: no. .     SYMPTOMS/RELATED:Possible medication side effects include:   Myalgia: no.  .     REVIEW OF SYMPTOMS: past weights:   Wt Readings from Last 1 Encounters:   08/06/20 0844 84.3 kg (185 lb 13.6 oz)                                                     Last lipids: total   Lab Results   Component Value Date    CHOL 158 07/27/2020    CHOL 149 11/12/2019    CHOL 158 05/14/2019                                                                     HDL   Lab Results   Component Value Date    HDL 39 (L) 07/27/2020    HDL 47 11/12/2019    HDL 45 05/14/2019                                                                     LDL   Lab Results   Component Value Date    LDLCALC 94.4 07/27/2020    LDLCALC 84.4 11/12/2019    LDLCALC 90.4 05/14/2019                                                                     TRIG   Lab Results   Component Value Date    TRIG 123 07/27/2020    TRIG 88 11/12/2019    TRIG 113 05/14/2019              Lower_Extremity_Problems(+). Pain: yes. . Injury: no.   HPI:  The patient has to raise is feet up to walk and has been falling several times without injury.    ONSET/TIMING: . Onset   6 months   ago.     DURATION:   Intermittent         QUALITY/COURSE:    unchanged ,. .     LOCATION:    Left foot    . Radiation: no.      INTENSITY/SEVERITY:. Severity is #   5   (10 point scale).        MODIFIERS/TREATMENTS: Current_Limitations_are:  none..   Taking medications: no    Physical_Therapy: no.  Chiropractor: no.     SYMPTOMS/RELATED: . --Possible medication side effects include:.     The following symptoms/statements  are positive if BOLD, negative otherwise.      CONTEXT/WHEN:  ". Activity . weight bearing. Inactivity.  Sudden  Work related.  Similar_problems_in past.   . Litigation_pending . X-rays. CT . MRI      REVIEW OF SYMPTOMS:   Numbness. Weakness. Muscle_Problems. Fever. Joint_Problems. Swelling. Erythema. Weight_loss. Instability.      .                                                                              Review of Systems      Objective:      Vitals:    08/06/20 0844   BP: 110/80   Pulse: 66   Resp: 16   Temp: 97.8 °F (36.6 °C)   TempSrc: Oral   SpO2: 95%   Weight: 84.3 kg (185 lb 13.6 oz)   Height: 5' 7" (1.702 m)   PainSc: 0-No pain     Physical Exam  Musculoskeletal:      Right lower leg: Edema present.      Left lower leg: Edema (Left leg edema is 2+ well right leg edema is 1+) present.      Comments: Left great toe is flexed even at rest.  No strength with the L elevation of the left great toe.  Cannot walk on his heels on either foot.  Reflexes are present only with augmentation at the knees and the ankles but are greater on the right than the left.   Neurological:      Sensory: No sensory deficit.      Motor: Weakness present.      Gait: Gait abnormal.       Recent Results (from the past 1008 hour(s))   Comprehensive metabolic panel    Collection Time: 07/27/20  7:56 AM   Result Value Ref Range    Sodium 141 136 - 145 mmol/L    Potassium 3.5 3.5 - 5.1 mmol/L    Chloride 102 95 - 110 mmol/L    CO2 32 (H) 23 - 29 mmol/L    Glucose 87 70 - 110 mg/dL    BUN, Bld 13 8 - 23 mg/dL    Creatinine 1.0 0.5 - 1.4 mg/dL    Calcium 9.4 8.7 - 10.5 mg/dL    Total Protein 7.0 6.0 - 8.4 g/dL    Albumin 3.7 3.5 - 5.2 g/dL    Total Bilirubin 1.2 (H) 0.1 - 1.0 mg/dL    Alkaline Phosphatase 74 55 - 135 U/L    AST 33 10 - 40 U/L    ALT 38 10 - 44 U/L    Anion Gap 7 (L) 8 - 16 mmol/L    eGFR if African American >60 >60 mL/min/1.73 m^2    eGFR if non African American >60 >60 mL/min/1.73 m^2   Lipid panel    Collection Time: 07/27/20  7:56 AM   Result Value Ref Range    Cholesterol 158 120 - " 199 mg/dL    Triglycerides 123 30 - 150 mg/dL    HDL 39 (L) 40 - 75 mg/dL    LDL Cholesterol 94.4 63.0 - 159.0 mg/dL    Hdl/Cholesterol Ratio 24.7 20.0 - 50.0 %    Total Cholesterol/HDL Ratio 4.1 2.0 - 5.0    Non-HDL Cholesterol 119 mg/dL   Complete PFT with bronchodilator    Collection Time: 07/28/20 12:52 PM   Result Value Ref Range    Interpretation         Normal spirometry.   Airflow is not clearly improved after bronchodilator. Clinical improvement following bronchodilator therapy may occur in the absence of spirometric improvement.   Moderate restriction is noted on lung volume testing.  Mild reduction in diffusion capacity -unadjusted for hemoglobin.        Post FVC 2.78 (L) 2.84 - 4.88 L    Post FEV1 2.06 (L) 2.08 - 3.75 L    Post FEV1 FVC 74.05 61.93 - 89.75 %    Post FEF 25 75 1.53 0.91 - 3.47 L/s    Post PEF 6.19 5.44 - 9.82 L/s    Post  8.14 sec    Pre DLCO 15.41 (L) 18.07 - 31.93 ml/(min*mmHg)    DLCO ADJ PRE 15.41 (L) 18.07 - 31.93 ml/(min*mmHg)    DLCOVA PRE 3.53 2.50 - 4.92 ml/(min*mmHg*L)    DLVAAdj PRE 3.53 2.50 - 4.92 ml/(min*mmHg*L)    VA PRE 4.36 (L) 6.59 - 6.59 L    IVC PRE 2.48 (L) 2.84 - 4.88 L    TLCN2 PRE 4.56 (L) 5.59 - 7.89 L    VCMAXN2 PRE 2.98 2.84 - 4.88 L    Pre FRC N2 1.80 L    ERVN2 PRE 0.17 -50364.03 - 60118.97 L    RVN2 PRE 1.58 (L) 1.97 - 3.32 L    HHB6MHAG5 PRE 34.72 33.45 - 51.41 %    Pre FVC 2.98 2.84 - 4.88 L    Pre FEV1 2.21 2.08 - 3.75 L    Pre FEV1 FVC 74.22 61.93 - 89.75 %    Pre FEF 25 75 1.64 0.91 - 3.47 L/s    Pre PEF 6.38 5.44 - 9.82 L/s    Pre  8.74 sec    Pre MVV 85.00 (L) 96.29 - 130.27 L/min    FVC Ref 3.86     FVC LLN 2.84     FVC Pre Ref 77.0 %    FVC Post Ref 71.9 %    FVC Chg -6.6 %    FEV1 Ref 2.92     FEV1 LLN 2.08     FEV1 Pre Ref 75.7 %    FEV1 Post Ref 70.6 %    FEV1 Chg -6.8 %    FEV1 FVC Ref 76     FEV1 FVC LLN 62     FEV1 FVC Pre Ref 97.9 %    FEV1 FVC Post Ref 97.6 %    FEV1 FVC Chg -0.2 %    FEF 25 75 Ref 2.19     FEF 25 75 LLN 0.91      FEF 25 75 Pre Ref 74.8 %    FEF 25 75 Post Ref 70.1 %    FEF 25 75 Chg -6.3 %    PEF Ref 7.63     PEF LLN 5.44     PEF Pre Ref 83.6 %    PEF Post Ref 81.1 %    PEF Chg -3.0 %    VTN212 Chg -6.9 %    MVV Ref 113     MVV LLN 96     MVV Pre Ref 75.0 %    TLCN2 Ref 6.74     TLCN2 LLN 5.59     TLCN2 Pre Ref 67.6 %    VCMAXN2 Ref 3.86     VCMAXN2 LLN 2.84     VCMAXN2 Pre Ref 77.0 %    FRCN2 Ref 3.62     FRCN2 LLN 2.63     FRCN2 Pre Ref 49.7 %    ERVN2 Ref 0.97     ERVN2 LLN -59179.03     ERVN2 Pre Ref 17.0 %    RVN2 Ref 2.64     RVN2 LLN 1.97     RVN2 Pre Ref 59.9 %    ZDY8CAPL8 Ref 42.43     FHS4OOJO0 LLN 33.45     SJD1AOZS9 Pre Ref 81.8 %    DLCO Single Breath Ref 25.00     DLCO Single Breath LLN 18.07     DLCO Single Breath Pre Ref 61.6 %    DLCOc Single Breath Ref 25.00     DLCOc Single Breath LLN 18.07     DLCOc Single Breath Pre Ref 61.6 %    DLCOVA Ref 3.71     DLCOVA LLN 2.50     DLCOVA Pre Ref 95.3 %    DLCOc SBVA Ref 3.71     DLCOc SBVA LLN 2.50     DLCOc SBVA Pre Ref 95.3 %    VA Single Breath Ref 6.59     VA Single Breath LLN 6.59     VA Single Breath Pre Ref 66.2 %    IVC Single Breath Ref 3.86     IVC Single Breath LLN 2.84     IVC Single Breath Pre Ref 64.1 %          Assessment:       1. Left foot drop    2. Leg edema, left    3. Essential hypertension          Plan:   Script for OFA foot splint.     Left foot drop  -     Ambulatory referral/consult to Neurology; Future; Expected date: 08/13/2020  -     Ambulatory referral/consult to Physical Medicine Rehab; Future; Expected date: 08/13/2020    Leg edema, left  -     US Lower Extremity Veins Left; Future; Expected date: 08/06/2020    Essential hypertension  -     CBC auto differential; Future; Expected date: 08/06/2020  -     Comprehensive metabolic panel; Future; Expected date: 08/06/2020      Follow up in about 3 months (around 11/6/2020).

## 2020-08-06 NOTE — TELEPHONE ENCOUNTER
I will order the covid antibody but he should know that having the antibody test positive is not a guarantee of safety under any circumstances

## 2020-08-07 ENCOUNTER — TELEPHONE (OUTPATIENT)
Dept: FAMILY MEDICINE | Facility: CLINIC | Age: 75
End: 2020-08-07

## 2020-08-07 NOTE — TELEPHONE ENCOUNTER
----- Message from Becyk Milian sent at 8/7/2020 10:39 AM CDT -----  Regarding: results/fitkit  Contact: Izabel  Type:  Test Results    Who Called:  WifeIzabel  Name of Test (Lab/Mammo/Etc):  PFT  Date of Test:  7/27  Ordering Provider: Emile  Where the test was performed:  [facility]  Best Call Back Number:  [phone]  Additional Information:         Additional Information:   Wife states they were supposed to receive fit kit yesterday at [facility], but did not.  Requesting this please be mailed to them at:    [address] [facility] [address]

## 2020-08-13 ENCOUNTER — PATIENT OUTREACH (OUTPATIENT)
Dept: ADMINISTRATIVE | Facility: OTHER | Age: 75
End: 2020-08-13

## 2020-08-13 NOTE — PROGRESS NOTES
Chart was reviewed for overdue Proactive [facility] Encounters (CESIA)  topics  Updates were requested from care everywhere  Health Maintenance was unable to be updated  LINKS immunization registry triggered

## 2020-08-17 ENCOUNTER — TELEPHONE (OUTPATIENT)
Dept: PHYSICAL MEDICINE AND REHAB | Facility: CLINIC | Age: 75
End: 2020-08-17

## 2020-08-17 NOTE — TELEPHONE ENCOUNTER
"----- Message from Rashawn BURNHAM Frisard sent at 8/17/2020  1:54 PM CDT -----  Regarding: Appointment Confirmation  Contact: Wife/Izabel MACE--Izabel called in and wanted to let office know that patient "will" be there for his appointment tomorrow Tuesday 8/18/20 at 11:20am.    "

## 2020-08-18 ENCOUNTER — OFFICE VISIT (OUTPATIENT)
Dept: PHYSICAL MEDICINE AND REHAB | Facility: CLINIC | Age: 75
End: 2020-08-18
Payer: MEDICARE

## 2020-08-18 VITALS
BODY MASS INDEX: 29.03 KG/M2 | WEIGHT: 185 LBS | HEIGHT: 67 IN | DIASTOLIC BLOOD PRESSURE: 74 MMHG | SYSTOLIC BLOOD PRESSURE: 128 MMHG | HEART RATE: 77 BPM

## 2020-08-18 DIAGNOSIS — M54.9 DORSALGIA, UNSPECIFIED: ICD-10-CM

## 2020-08-18 DIAGNOSIS — M21.372 BILATERAL FOOT-DROP: ICD-10-CM

## 2020-08-18 DIAGNOSIS — R53.1 WEAKNESS: Primary | ICD-10-CM

## 2020-08-18 DIAGNOSIS — Z98.1 STATUS POST CERVICAL SPINAL FUSION: ICD-10-CM

## 2020-08-18 DIAGNOSIS — Z01.818 ENCOUNTER FOR OTHER PREPROCEDURAL EXAMINATION: ICD-10-CM

## 2020-08-18 DIAGNOSIS — M21.371 BILATERAL FOOT-DROP: ICD-10-CM

## 2020-08-18 DIAGNOSIS — M54.2 CERVICALGIA: ICD-10-CM

## 2020-08-18 DIAGNOSIS — R27.0 ATAXIA, UNSPECIFIED: ICD-10-CM

## 2020-08-18 PROCEDURE — 99999 PR PBB SHADOW E&M-EST. PATIENT-LVL V: ICD-10-PCS | Mod: PBBFAC,,, | Performed by: PHYSICAL MEDICINE & REHABILITATION

## 2020-08-18 PROCEDURE — 99999 PR PBB SHADOW E&M-EST. PATIENT-LVL V: CPT | Mod: PBBFAC,,, | Performed by: PHYSICAL MEDICINE & REHABILITATION

## 2020-08-18 PROCEDURE — 99204 OFFICE O/P NEW MOD 45 MIN: CPT | Mod: S$PBB,,, | Performed by: PHYSICAL MEDICINE & REHABILITATION

## 2020-08-18 PROCEDURE — 99204 PR OFFICE/OUTPT VISIT, NEW, LEVL IV, 45-59 MIN: ICD-10-PCS | Mod: S$PBB,,, | Performed by: PHYSICAL MEDICINE & REHABILITATION

## 2020-08-18 PROCEDURE — 99215 OFFICE O/P EST HI 40 MIN: CPT | Mod: PBBFAC,PN | Performed by: PHYSICAL MEDICINE & REHABILITATION

## 2020-08-18 NOTE — LETTER
August 18, 2020      Daniel Baptiste MD  [address]  [place] [address]           [place] [facility]  [address] [place] [address] [address]  [place] [address]  Phone: [phone]  Fax: [phone]          Patient: Michael Ann Jr.   MR Number: [id number]   YOB: 1945   Date of Visit: 8/18/2020       Dear Dr. Daniel Baptiste:    Thank you for referring Michael Ann to me for evaluation. Attached you will find relevant portions of my assessment and plan of care.    If you have questions, please do not hesitate to call me. I look forward to following Michael Ann along with you.    Sincerely,    Analisa Ruiz,     Enclosure  CC:  No Recipients    If you would like to receive this communication electronically, please contact [email or web address] or [phone] to request more information on [employer] access.    For providers and/or their staff who would like to refer a patient to [facility], please contact us through our one-stop-shop provider referral line, [facility], at [phone].    If you feel you have received this communication in error or would no longer like to receive these types of communications, please e-mail [email or web address]

## 2020-08-18 NOTE — PROGRESS NOTES
HPI:  Patient is a 75 y.o. year old male who is being referred for foot drop. He is having greater difficulty walking because it is difficulty to  his feet. His pcp ordered an AFO for his left, which he was fitted with but still has not received it. He reports a prior history of cervical fusion. He  admits to greater difficulty w. Balance. He needs to use a walking stick so that he does not fall. He complains of dropping things frequently. He has difficulty w. Buttoning of his shirts or w. Fine motor skills. He denies any pain.     Imaging    X-Ray Cervical Spine AP And Lateral  Order: [id number]  Status:  Final result   Visible to patient:  No (not released) Next appt:  09/08/2020 at 07:00 AM in Radiology ([facility] US 1) Dx:  Chronic neck pain  Details    Reading Physician Reading Date Result Priority   Nabor Canales MD  [phone] 6/12/2018       Narrative & Impression     EXAMINATION:  XR CERVICAL SPINE AP LATERAL     CLINICAL HISTORY:  Cervicalgia     TECHNIQUE:  AP, lateral and open mouth views of the cervical spine were performed.     COMPARISON:  None.     FINDINGS:  There are postoperative changes of anterior cervical fusion at the C5-6 and C6-7 levels.  The fusion sites appear relatively well united and well aligned.  There is degenerative disc disease at the C4-5 and C7-T1 levels with disc space narrowing and vertebral endplate osteophyte formation.  There is mild anterior subluxation of C7 over distance of 3 mm most likely related to degenerative arthrosis.  Vertebral body heights are well maintained.  The odontoid and C1-2 relationship are normal.     IMPRESSION:      1. Anterior cervical fusion of the C5-6 and C6-7 levels.  2. C4-5 and C7-T1 degenerative disc disease.  3. Mild anterior C7 subluxation.           Labs  egfr cr lfts gluc nl    Past Medical History:   Diagnosis Date    Colon polyps     Hearing loss     Hiatal hernia     Hypertension     Prostate disease     Tuberculosis       Past Surgical History:   Procedure Laterality Date    eye lid surgey      HAND SURGERY      HAND SURGERY      HERNIA REPAIR      HERNIA REPAIR      NECK SURGERY      NECK SURGERY      PROSTATE SURGERY      PROSTATECTOMY      SHOULDER SURGERY      SKIN CANCER EXCISION      Skin Cancer removed from his left arm and left shoulder       Family History   Problem Relation Age of Onset    Prostate cancer Brother     Prostate cancer Brother      Social History     Socioeconomic History    Marital status:      Spouse name: Not on file    Number of children: Not on file    Years of education: Not on file    Highest education level: Not on file   Occupational History    Not on file   Social Needs    Financial resource strain: Not on file    Food insecurity     Worry: Not on file     Inability: Not on file    Transportation needs     Medical: Not on file     Non-medical: Not on file   Tobacco Use    Smoking status: Never Smoker    Smokeless tobacco: Never Used   Substance and Sexual Activity    Alcohol use: No     Alcohol/week: 0.0 standard drinks    Drug use: No    Sexual activity: Not on file   Lifestyle    Physical activity     Days per week: Not on file     Minutes per session: Not on file    Stress: Not on file   Relationships    Social connections     Talks on phone: Not on file     Gets together: Not on file     Attends Mandaeism service: Not on file     Active member of club or organization: Not on file     Attends meetings of clubs or organizations: Not on file     Relationship status: Not on file   Other Topics Concern    Not on file   Social History Narrative    Not on file       Review of patient's allergies indicates:  No Known Allergies    Current Outpatient Medications:     albuterol (PROVENTIL/VENTOLIN HFA) 90 mcg/actuation inhaler, Inhale 2 puffs into the lungs every 6 (six) hours as needed for Wheezing., Disp: 1 each, Rfl: 11    amLODIPine (NORVASC) 2.5 MG tablet,  Take 1 tablet (2.5 mg total) by mouth once daily., Disp: 90 tablet, Rfl: 3    atorvastatin (LIPITOR) 40 MG tablet, Take 1 tablet (40 mg total) by mouth once daily., Disp: 90 tablet, Rfl: 3    fluticasone propionate (FLONASE) 50 mcg/actuation nasal spray, 2 sprays (100 mcg total) by Each Nostril route once daily., Disp: 16 g, Rfl: 11    FLUZONE HIGH-DOSE 2018-19, PF, 180 mcg/0.5 mL vaccine, ADM 0.5ML IM UTD, Disp: , Rfl: 0    gabapentin (NEURONTIN) 100 MG capsule, TAKE ONE CAPSULE BY MOUTH IN THE MORNING, ONE AT NOON AND THREE CAPSULES AT BEDTIME, Disp: 450 capsule, Rfl: 1    ipratropium (ATROVENT) 42 mcg (0.06 %) nasal spray, 2 sprays by Nasal route 4 (four) times daily., Disp: 15 mL, Rfl: 5    LORATADINE (CLARITIN ORAL), Take by mouth once daily., Disp: , Rfl:     minocycline (MINOCIN,DYNACIN) 100 MG capsule, Take 100 mg by mouth 2 (two) times daily., Disp: , Rfl:     miSOPROStoL (CYTOTEC) 100 MCG Tab, Take 1 tablet (100 mcg total) by mouth 2 (two) times daily with meals., Disp: 180 tablet, Rfl: 1    pantoprazole (PROTONIX) 40 MG tablet, Take 1 tablet (40 mg total) by mouth once daily., Disp: 90 tablet, Rfl: 1    PREVNAR 13, PF, 0.5 mL Syrg, ADM 0.5ML IM UTD, Disp: , Rfl: 0    triamterene-hydrochlorothiazide 37.5-25 mg (MAXZIDE-25) 37.5-25 mg per tablet, Take 1 tablet by mouth once daily., Disp: 90 tablet, Rfl: 1    aspirin (ECOTRIN) 81 MG EC tablet, Take 1 tablet (81 mg total) by mouth once daily., Disp: 90 tablet, Rfl: 3      Review of Systems:    No nausea, vomiting, fevers, chills , contipation, diarrhea or sweats,no weight change, +neck stiffness, no chest pain, no sob, no change of bowel or bladder habits,no coordination issues      Physical Exam:      Vitals:    08/18/20 1114   BP: 128/74   Pulse: 77   alert and oriented ×4 follows commands answers all questions appropriately,affect wnl  Manual muscle test 4/5 BUE, LLE left ADF 0, L EHL 0, R ADF trace , R EHL 4, , b/l HF and extensors 4 sensation  to light touch grossly intact  +muscular atrophy w. indentention of the vastus medialis musculature bilaterally  assymetric girth of biceps, left is smaller  B/l foot drop L>R  Ataxic gait  babinsky down  No clonus  DTR's symmetric hyporeflexic  +brawny skin b/l lower extremities  +non pitting ankle edema L>R      Assessment:  Probable cervical myelopathy- however, due to the atrophy and weakness of all limbs I will need to look at the entire spine. I will order mri's of cervical ,lumbar and thoracic spine  Gait ataxia due to #1  Weakness of 4 limbs including b/l foot drop  Prior history of cervical fusion  Plan:  MRI's of cervical , thoracic and lumbar spine  EMG * 4 limbs  Will also assess stability of cervical fusion and order films in extension and flexion  He denies any pain and is declining any oral medications    Thank you for this interesting referral-note will be sent via Epic to referring provider (Dr. Baptiste)    Greater than 50%of time spent reviewing diagnosis, prognosis and treatment

## 2020-08-25 ENCOUNTER — HOSPITAL ENCOUNTER (OUTPATIENT)
Dept: RADIOLOGY | Facility: HOSPITAL | Age: 75
Discharge: HOME | End: 2020-08-25
Attending: PHYSICAL MEDICINE & REHABILITATION
Payer: MEDICARE

## 2020-08-25 ENCOUNTER — TELEPHONE (OUTPATIENT)
Dept: FAMILY MEDICINE | Facility: CLINIC | Age: 75
End: 2020-08-25

## 2020-08-25 DIAGNOSIS — M54.2 CERVICALGIA: ICD-10-CM

## 2020-08-25 DIAGNOSIS — Z98.1 STATUS POST CERVICAL SPINAL FUSION: ICD-10-CM

## 2020-08-25 DIAGNOSIS — R27.0 ATAXIA, UNSPECIFIED: ICD-10-CM

## 2020-08-25 DIAGNOSIS — Z01.818 ENCOUNTER FOR OTHER PREPROCEDURAL EXAMINATION: ICD-10-CM

## 2020-08-25 DIAGNOSIS — M54.9 DORSALGIA, UNSPECIFIED: ICD-10-CM

## 2020-08-25 PROCEDURE — 72141 MRI NECK SPINE W/O DYE: CPT | Mod: 26,,, | Performed by: RADIOLOGY

## 2020-08-25 PROCEDURE — 72050 XR CERVICAL SPINE AP LAT WITH FLEX EXTEN: ICD-10-PCS | Mod: 26,,, | Performed by: RADIOLOGY

## 2020-08-25 PROCEDURE — 72146 MRI CHEST SPINE W/O DYE: CPT | Mod: TC

## 2020-08-25 PROCEDURE — 72148 MRI LUMBAR SPINE WITHOUT CONTRAST: ICD-10-PCS | Mod: 26,,, | Performed by: RADIOLOGY

## 2020-08-25 PROCEDURE — 72110 X-RAY EXAM L-2 SPINE 4/>VWS: CPT | Mod: 26,,, | Performed by: RADIOLOGY

## 2020-08-25 PROCEDURE — 72148 MRI LUMBAR SPINE W/O DYE: CPT | Mod: 26,,, | Performed by: RADIOLOGY

## 2020-08-25 PROCEDURE — 72110 XR LUMBAR SPINE COMPLETE 5 VIEW: ICD-10-PCS | Mod: 26,,, | Performed by: RADIOLOGY

## 2020-08-25 PROCEDURE — 72050 X-RAY EXAM NECK SPINE 4/5VWS: CPT | Mod: 26,,, | Performed by: RADIOLOGY

## 2020-08-25 PROCEDURE — 72141 MRI NECK SPINE W/O DYE: CPT | Mod: TC

## 2020-08-25 PROCEDURE — 72141 MRI CERVICAL SPINE WITHOUT CONTRAST: ICD-10-PCS | Mod: 26,,, | Performed by: RADIOLOGY

## 2020-08-25 PROCEDURE — 72146 MRI CHEST SPINE W/O DYE: CPT | Mod: 26,,, | Performed by: RADIOLOGY

## 2020-08-25 PROCEDURE — 72148 MRI LUMBAR SPINE W/O DYE: CPT | Mod: TC

## 2020-08-25 PROCEDURE — 72110 X-RAY EXAM L-2 SPINE 4/>VWS: CPT | Mod: TC,FY

## 2020-08-25 PROCEDURE — 72146 MRI THORACIC SPINE WITHOUT CONTRAST: ICD-10-PCS | Mod: 26,,, | Performed by: RADIOLOGY

## 2020-08-25 PROCEDURE — 72050 X-RAY EXAM NECK SPINE 4/5VWS: CPT | Mod: TC,FY

## 2020-08-25 NOTE — TELEPHONE ENCOUNTER
----- Message from Tyrone Harrison sent at 8/25/2020 10:11 AM CDT -----  Regarding: Pescription request  Contact: Edita Frye  Type:  Patient Returning Call    Who Called:  Edita Frye  Does the patient know what this is regarding?: please follow up with prescription request  Best Call Back Number:    Additional Information:  Thank you

## 2020-08-28 ENCOUNTER — TELEPHONE (OUTPATIENT)
Dept: PHYSICAL MEDICINE AND REHAB | Facility: CLINIC | Age: 75
End: 2020-08-28

## 2020-08-28 NOTE — TELEPHONE ENCOUNTER
----- Message from Lisa Cash sent at 8/28/2020  2:48 PM CDT -----  Pt wife called asking if the results for the test ordered can be sent over to Dr. Daniel Baptiste's office. I told her that he would be able to access those results through pt chart but she insisted on leaving the message anyway. She would also like to speak with the office     Pt can be reached at [phone]

## 2020-09-04 ENCOUNTER — OFFICE VISIT (OUTPATIENT)
Dept: PHYSICAL MEDICINE AND REHAB | Facility: CLINIC | Age: 75
End: 2020-09-04
Payer: MEDICARE

## 2020-09-04 ENCOUNTER — TELEPHONE (OUTPATIENT)
Dept: PHYSICAL MEDICINE AND REHAB | Facility: CLINIC | Age: 75
End: 2020-09-04

## 2020-09-04 VITALS
BODY MASS INDEX: 29.03 KG/M2 | WEIGHT: 185 LBS | SYSTOLIC BLOOD PRESSURE: 146 MMHG | HEART RATE: 78 BPM | HEIGHT: 67 IN | DIASTOLIC BLOOD PRESSURE: 90 MMHG

## 2020-09-04 DIAGNOSIS — M21.372 FOOT DROP, LEFT: Primary | ICD-10-CM

## 2020-09-04 DIAGNOSIS — M54.2 CERVICALGIA: ICD-10-CM

## 2020-09-04 DIAGNOSIS — M54.9 DORSALGIA, UNSPECIFIED: ICD-10-CM

## 2020-09-04 DIAGNOSIS — R27.0 ATAXIA, UNSPECIFIED: ICD-10-CM

## 2020-09-04 DIAGNOSIS — R53.1 WEAKNESS: ICD-10-CM

## 2020-09-04 DIAGNOSIS — G56.03 BILATERAL CARPAL TUNNEL SYNDROME: ICD-10-CM

## 2020-09-04 PROCEDURE — 95913 PR NERVE CONDUCTION STUDY; 13 OR MORE STUDIES: ICD-10-PCS | Mod: 26,S$PBB,, | Performed by: PHYSICAL MEDICINE & REHABILITATION

## 2020-09-04 PROCEDURE — 95886 MUSC TEST DONE W/N TEST COMP: CPT | Mod: PBBFAC,PN | Performed by: PHYSICAL MEDICINE & REHABILITATION

## 2020-09-04 PROCEDURE — 99213 OFFICE O/P EST LOW 20 MIN: CPT | Mod: S$PBB,,, | Performed by: PHYSICAL MEDICINE & REHABILITATION

## 2020-09-04 PROCEDURE — 99499 NO LOS: ICD-10-PCS | Mod: S$PBB,,, | Performed by: PHYSICAL MEDICINE & REHABILITATION

## 2020-09-04 PROCEDURE — 95913 NRV CNDJ TEST 13/> STUDIES: CPT | Mod: PBBFAC,PN | Performed by: PHYSICAL MEDICINE & REHABILITATION

## 2020-09-04 PROCEDURE — 99999 PR PBB SHADOW E&M-EST. PATIENT-LVL IV: CPT | Mod: PBBFAC,,, | Performed by: PHYSICAL MEDICINE & REHABILITATION

## 2020-09-04 PROCEDURE — 99213 PR OFFICE/OUTPT VISIT, EST, LEVL III, 20-29 MIN: ICD-10-PCS | Mod: S$PBB,,, | Performed by: PHYSICAL MEDICINE & REHABILITATION

## 2020-09-04 PROCEDURE — 99214 OFFICE O/P EST MOD 30 MIN: CPT | Mod: PBBFAC,PN | Performed by: PHYSICAL MEDICINE & REHABILITATION

## 2020-09-04 PROCEDURE — 95886 MUSC TEST DONE W/N TEST COMP: CPT | Mod: 26,S$PBB,, | Performed by: PHYSICAL MEDICINE & REHABILITATION

## 2020-09-04 PROCEDURE — 99499 UNLISTED E&M SERVICE: CPT | Mod: S$PBB,,, | Performed by: PHYSICAL MEDICINE & REHABILITATION

## 2020-09-04 PROCEDURE — 99999 PR PBB SHADOW E&M-EST. PATIENT-LVL IV: ICD-10-PCS | Mod: PBBFAC,,, | Performed by: PHYSICAL MEDICINE & REHABILITATION

## 2020-09-04 PROCEDURE — 95886 PR EMG COMPLETE, W/ NERVE CONDUCTION STUDIES, 5+ MUSCLES: ICD-10-PCS | Mod: 26,S$PBB,, | Performed by: PHYSICAL MEDICINE & REHABILITATION

## 2020-09-04 PROCEDURE — 95913 NRV CNDJ TEST 13/> STUDIES: CPT | Mod: 26,S$PBB,, | Performed by: PHYSICAL MEDICINE & REHABILITATION

## 2020-09-04 RX ORDER — GABAPENTIN 300 MG/1
300 CAPSULE ORAL 3 TIMES DAILY
Qty: 270 CAPSULE | Refills: 2 | Status: SHIPPED | OUTPATIENT
Start: 2020-09-04 | End: 2021-07-08

## 2020-09-04 NOTE — PROGRESS NOTES
[facility]  Physical Medicine and Rehabilitation   [address] [place] [address], [address]  [place] [address]             Patient: Michael Ann   Patient ID: [id number]   Sex:     Date of Birth:     Age:     Notes:     Last visit date: 9/4/2020         Visit date and time: 9/4/2020 12:42   Patient Age on Visit Date:     Referring Physician:     Diagnoses:       Arm and leg weakness      Sensory NCS      Nerve / Sites Rec. Site Onset Lat Peak Lat Ref. NP Amp Ref. PP Amp Ref. Segments Distance Velocity     ms ms ms µV µV µV µV  cm m/s   R Median - Digit II (Antidromic)      Wrist Dig II 3.33 4.06 ?3.40 4.0 ?15.0 6.1 ?20.0 Wrist - Dig II 13 39   L Median - Digit II (Antidromic)      Wrist Dig II 3.18 4.22 ?3.40 1.1 ?15.0 5.6 ?20.0 Wrist - Dig II 13 41   R Ulnar - Digit V (Antidromic)      Wrist Dig V 2.86 3.39 ?3.10 3.4 ?10.0 22.6 ?15.0 Wrist - Dig V 11 38   L Ulnar - Digit V (Antidromic)      Wrist Dig V 3.39 4.01 ?3.10 3.8 ?10.0 16.3 ?15.0 Wrist - Dig V 11 32   R Sural - Ankle (Calf)      Calf Ankle NR NR ?4.20 NR ?5.0 NR ?5.0 Calf - Ankle 14 NR   L Sural - Ankle (Calf)      Calf Ankle NR NR ?4.20 NR ?5.0 NR ?5.0 Calf - Ankle 14 NR   R Superficial peroneal - Ankle      Lat leg Ankle NR NR ?4.40 NR ?5.0 NR ?5.0 Lat leg - Ankle 14 NR   L Superficial peroneal - Ankle      Lat leg Ankle NR NR ?4.40 NR ?5.0 NR ?5.0 Lat leg - Ankle 14 NR       Motor NCS      Nerve / Sites Muscle Latency Ref. Amplitude Ref. Amp % Duration Segments Distance Lat Diff Velocity Ref.     ms ms mV mV % ms  cm ms m/s m/s   L Median - APB      Wrist APB 4.64 ?4.40 6.1 ?4.0 100 6.82 Wrist - APB 7         Elbow APB 9.43  6.0  97.8 7.24 Elbow - Wrist 23 4.79 48 ?49   R Median - APB      Wrist APB 3.49 ?4.40 6.2 ?4.0 100 6.72 Wrist - APB 7         Elbow APB 8.75  2.1  33.5 6.67 Elbow - Wrist 23 5.26 44 ?49   L Ulnar - ADM      Wrist ADM 2.92 ?3.60 7.0 ?5.0 100 6.72 Wrist - ADM 7         B.Elbow ADM 7.66  7.0  98.8 7.19 B.Elbow - Wrist 22  4.74 46 ?49      A.Elbow ADM 9.01  7.6  108 7.19 A.Elbow - B.Elbow 10 1.35 74 ?49   R Ulnar - ADM      Wrist ADM 1.72 ?3.60 6.9 ?5.0 100 8.18 Wrist - ADM 7         B.Elbow ADM 6.72  7.6  110 7.71 B.Elbow - Wrist 24 5.00 48 ?49      A.Elbow ADM 8.02  5.2  75.5 7.29 A.Elbow - B.Elbow 10 1.30 77 ?49   R Peroneal - EDB      Ankle EDB NR ?6.20 NR ?2.0 NR NR Ankle - EDB 8      L Peroneal - EDB      Ankle EDB NR ?6.20 NR ?2.0 NR NR Ankle - EDB 8      R Tibial - AH      Ankle AH NR ?6.00 NR ?3.0 NR NR Ankle - AH 8      L Tibial - AH      Ankle AH NR ?6.00 NR ?3.0 NR NR Ankle - AH 8          EMG Summary Table     Spontaneous MUAP Recruitment   Muscle IA Fib PSW Fasc H.F. Amp Dur. PPP Pattern   L. Biceps brachii N None None None None N N N N   R. Biceps brachii N None None None None 1+ 1+ 1+ Reduced   L. Deltoid N None None None None N N N N   R. Deltoid N None None None None N N N N   L. Triceps brachii N None None None None 1+ 1+ 1+ Reduced   R. Triceps brachii N None None None None N N N N   L. Extensor carpi radialis brevis N None None None None 1+ 1+ 1+ Reduced   R. Extensor carpi radialis brevis N None None None None N N N N   L. First dorsal interosseous N None None None None 1+ 1+ 1+ Reduced   R. First dorsal interosseous N None None None None N N N N   L. Rectus femoris N None None None None 2+ 2+ 2+ Reduced   R. Rectus femoris N None None None None 1+ 1+ 1+ Reduced   L. Vastus lateralis N None None None None 2+ 2+ 2+ Reduced   R. Vastus lateralis N None None None None 1+ 1+ 1+ Reduced   L. Biceps femoris (short head) N None None None None 1+ 1+ 1+ Reduced   R. Biceps femoris (short head) N None None None None N N N N   L. Gastrocnemius (Medial head) N None None None None N N N N   R. Gastrocnemius (Medial head) N None None None None N N N N   L. Tibialis anterior N None None None None 2+ 2+ 2+ Reduced   R. Tibialis anterior N None None None None 1+ 1+ 1+ Reduced       Summary    The motor conduction test had results  outside of the specified normal range in all 8 of the tested nerves:   In the L Median - APB study  o the take off latency result was increased for Wrist stimulation  o the take off velocity result was reduced for Elbow - Wrist segment   In the R Median - APB study  o the take off velocity result was reduced for Elbow - Wrist segment   In the L Ulnar - ADM study  o the take off velocity result was reduced for B.Elbow - Wrist segment   In the R Ulnar - ADM study  o the take off velocity result was reduced for B.Elbow - Wrist segment   In the R Peroneal - EDB study  o the response was considered absent for Ankle stimulation   In the L Peroneal - EDB study  o the response was considered absent for Ankle stimulation   In the R Tibial - AH study  o the response was considered absent for Ankle stimulation   In the L Tibial - AH study  o the response was considered absent for Ankle stimulation    The sensory conduction test had results outside of the specified normal range in all 8 of the tested nerves:   In the R Median - Digit II (Antidromic) study  o the peak latency result was increased for Wrist stimulation  o the peak amplitude result was reduced for Wrist stimulation   In the L Median - Digit II (Antidromic) study  o the peak latency result was increased for Wrist stimulation  o the peak amplitude result was reduced for Wrist stimulation   In the R Ulnar - Digit V (Antidromic) study  o the peak latency result was increased for Wrist stimulation  o the peak amplitude result was reduced for Wrist stimulation   In the L Ulnar - Digit V (Antidromic) study  o the peak latency result was increased for Wrist stimulation  o the peak amplitude result was reduced for Wrist stimulation   In the R Sural - Ankle (Calf) study  o the response was considered absent for Calf stimulation   In the L Sural - Ankle (Calf) study  o the response was considered absent for Calf stimulation   In the R Superficial peroneal -  Ankle study  o the response was considered absent for Lat leg stimulation   In the L Superficial peroneal - Ankle study  o the response was considered absent for Lat leg stimulation    The needle EMG examination was performed in 20 muscles. It was normal in 9 muscle(s): L. Biceps brachii, L. Deltoid, R. Deltoid, R. Triceps brachii, R. Extensor carpi radialis brevis, R. First dorsal interosseous, R. Biceps femoris (short head), L. Gastrocnemius (Medial head), R. Gastrocnemius (Medial head). The study was abnormal in 11 muscle(s), with the following distribution:   The MUP waveform abnormality was found in R. Biceps brachii, L. Triceps brachii, L. Extensor carpi radialis brevis, L. First dorsal interosseous, L. Rectus femoris, R. Rectus femoris, L. Vastus lateralis, R. Vastus lateralis, L. Biceps femoris (short head), L. Tibialis anterior, R. Tibialis anterior.   Abnormal interference pattern was found in R. Biceps brachii, L. Triceps brachii, L. Extensor carpi radialis brevis, L. First dorsal interosseous, L. Rectus femoris, R. Rectus femoris, L. Vastus lateralis, R. Vastus lateralis, L. Biceps femoris (short head), L. Tibialis anterior, R. Tibialis anterior.          Conclusion:     Moderate to severe left carpal tunnel syndrome  Moderate right carpal tunnel syndrome  Severe sensorimotor predominantly axonal neuropathy  Multilevel moderate left cervical polyradiculopathies  Severe left L4/L5 chronic radiculopathy with NO active denervation  Moderate right L4/L5 chronic radiculopathy with NO active denervation      ____________________________  Analisa Pablo D.O.

## 2020-09-04 NOTE — PROGRESS NOTES
HPI:  Patient is a 75 y.o. year old male w. Hand numbness, arm and leg weakness. He is having greater difficulty walking. He has been wearing an AFO, since I last evaluated him. He falls less and is walking better when he wears this. He feels unstable when we walks. I ordered MRI's see results below:  He reports that he is taking neurontin but it does not seem to be helping.    Imaging    X-Ray Cervical Spine AP Lat with Flex Ex  Order: [id number]  Status:  Final result   Visible to patient:  No (inaccessible in Patient Portal) Next appt:  09/08/2020 at 07:00 AM in Radiology ([facility] US 1) Dx:  Cervicalgia; Status post cervical spi...  Details    Reading Physician Reading Date Result Priority   Dread Balderrama MD  [phone] 8/25/2020 Routine      Narrative & Impression     EXAMINATION:  XR CERVICAL SPINE AP LAT WITH FLEX EXTEN     CLINICAL HISTORY:  Ataxia, unspecified     TECHNIQUE:  AP, lateral, flexion, extension and open-mouth odontoid views of the cervical spine were obtained     COMPARISON:  Cervical spine MRI obtained concurrently, plain films of the cervical spine dated 06/12/2018     FINDINGS:  There is disc space narrowing at the C4-5 level.  There has been previous interbody fusion of C5-6 and C6-7.  Mild anterolisthesis of C7 on T1 is better demonstrated on concurrent MRI.  There is no fracture.  The odontoid process is intact.  There is multilevel facet joint arthropathy.  There is trace anterolisthesis of C3 on C4.  There is no obvious instability.  The soft tissues are normal.     Impression:     As above              X-Ray Lumbar Spine Complete 5 View  Order: [id number]  Status:  Final result   Visible to patient:  No (inaccessible in Patient Portal) Next appt:  09/08/2020 at 07:00 AM in Radiology ([facility]) Dx:  Dorsalgia, unspecified  Details    Reading Physician Reading Date Result Priority   Dread Balderrama MD  [phone] 8/25/2020 Routine      Narrative & Impression      EXAMINATION:  XR LUMBAR SPINE COMPLETE 5 VIEW     CLINICAL HISTORY:  Back pain or radiculopathy, > 6 wks;Dorsalgia, unspecified     TECHNIQUE:  AP, lateral, spot and bilateral oblique views of the lumbar spine were preformed.     COMPARISON:  Lumbar spine MRI obtained concurrently     FINDINGS:  There is a rotary dextrocurvature of the lumbar spine.  The vertebral bodies maintain normal height.  There is no fracture.  There is grade 1 spondylolisthesis at the L4-5 level.  There is approximately 6 mm anterolisthesis of L4 on L5 by plain film evaluation which is increased compared to 4 mm seen on MRI suggesting some instability at this level.  There is multilevel facet joint arthropathy.  There is also mild retrolisthesis of L1 on L2.  There is disc space narrowing with endplate sclerosis and osteophyte formation most apparent at the L5-S1 level.     Impression:     1. There is grade 1 spondylolisthesis at the L4-5 level.  This measures 6 mm on plain film and 4 mm on MRI.  There is multilevel facet joint arthropathy.  Disc space narrowing is most severe at the L5-S1 level.  There is no fracture.                 MRI Lumbar Spine Without Contrast  Order: [id number]  Status:  Final result   Visible to patient:  No (inaccessible in Patient Portal) Next appt:  09/08/2020 at 07:00 AM in Radiology ([facility] US 1) Dx:  Dorsalgia, unspecified  Details    Reading Physician Reading Date Result Priority   Dread Balderrama MD  [phone] 8/25/2020 Routine      Narrative & Impression     EXAMINATION:  MRI LUMBAR SPINE WITHOUT CONTRAST     CLINICAL HISTORY:  Back pain, progressive neurologic deficit;leg weakness; Dorsalgia, unspecified     TECHNIQUE:  Multiplanar, multisequence MR images were acquired from the thoracolumbar junction to the sacrum without the administration of contrast.     COMPARISON:  None.     FINDINGS:  Vertebral column: The study is mildly motion degraded.  The lumbar vertebral bodies maintain normal height.   There is no fracture.  There is 4 mm anterolisthesis of L4 on L5.  Also, there is 2 mm retrolisthesis of L1 on L2 and trace retrolisthesis of T12 on L1.  All of the discs are desiccated.  There is marked disc space narrowing at the L5-S1 level worse towards the right where there is Modic type 1 degenerative endplate signal change in addition to vacuum disc.  There is moderate disc space narrowing at the L1-2 level.  Baseline marrow signal intensity is normal.     Spinal canal, conus, epidural space: The spinal canal is developmentally normal.  The conus terminates at the level of T12 and is normal in contour and signal intensity.  There is no abnormal epidural soft tissue mass or fluid collection.     Findings by level:     T12-L1: There is a mild disc bulge and mild facet joint arthropathy.  There is no spinal canal or significant foraminal stenosis.     L1-2: There is moderate disc space narrowing.  There is 2 mm retrolisthesis of L1 on L2.  There is inferior unroofing of a diffuse disc bulge with osteophytic ridging and there is facet joint arthropathy with ligamentum flavum thickening.  There is no spinal stenosis.  There is moderate left foraminal stenosis.     L2-3: There is facet joint arthropathy with ligamentum flavum thickening.  There is no spinal stenosis.  There is mild bilateral foraminal stenosis.     L3-4: There is a moderate diffuse disc bulge with osteophytic ridging eccentric to the left.  There is a subtle dorsal annular fissure.  There is facet joint arthropathy with ligamentum flavum thickening.  There is moderate spinal stenosis with mild-to-moderate right and moderate-to-marked left foraminal stenosis.  There is crowding of nerve roots in the cauda equina which are clumped centrally.     L4-5: There is 4 mm anterolisthesis of L4 on L5.  There is marked facet joint arthropathy with ligamentum flavum thickening.  There are bilateral facet joint effusions.  There is unroofing of a moderate  diffuse disc bulge.  There is moderate to severe spinal canal, right greater than left lateral recess stenosis with moderate right and moderate-to-marked left foraminal stenosis.     L5-S1: There is marked disc space narrowing worse towards the right.  There is a diffuse disc bulge and there is right greater than left facet joint arthropathy.  There is no significant spinal stenosis.  There is severe right foraminal stenosis.     Soft tissues, other: The prevertebral soft tissues are normal.  The aorta is normal in caliber.     Impression:     1. The study is mildly motion degraded.  There is multilevel degenerative disc and facet disease.  There is no fracture.  There is 4 mm anterolisthesis of L4 on L5 with minimal retrolisthesis of T12 on L1 and L1 on L2.  There is some degree of disc space narrowing, disc bulge with osteophytic ridging and facet joint arthropathy at multiple levels.  These findings are discussed in detail by level above.  The pertinent findings however are summarized below.  2. At the L4-5 level there is multifactorial moderate to severe spinal stenosis and right greater than left lateral recess stenosis.  There is moderate right and moderate-to-marked left foraminal stenosis.  3. At the L5-S1 level there is severe right foraminal stenosis without significant spinal canal or left foraminal stenosis.  4. At the L3-4 level there is mild-to-moderate right and moderate-to-marked left foraminal stenosis.  There is multifactorial moderate spinal stenosis.  5. At the L2-3 level there is mild bilateral foraminal stenosis.  6. At the L1-2 level there is moderate left foraminal stenosis without spinal stenosis.           MRI Thoracic Spine Without Contrast  Order: [id number]  Status:  Final result   Visible to patient:  No (inaccessible in Patient Portal) Next appt:  09/08/2020 at 07:00 AM in Radiology ([facility] 1) Dx:  Encounter for other preprocedural exa...  Details    Reading Physician Reading Date  Result Priority   Dread Balderrama MD  [phone] 8/25/2020 Routine      Narrative & Impression     EXAMINATION:  MRI THORACIC SPINE WITHOUT CONTRAST     CLINICAL HISTORY:  Presurgical eval, T-spine;leg weakness;  Encounter for other preprocedural examination     TECHNIQUE:  Multiplanar, multisequence images were performed through the thoracic spine.  Contrast was not administered.     COMPARISON:  None     FINDINGS:  Vertebral column:     The study is mildly motion degraded.  The thoracic vertebral bodies maintain normal height and alignment.  There is no fracture.  There is multilevel endplate osteophyte formation.  Baseline marrow signal is normal.  The discs are desiccated.     Spinal canal, cord, epidural space:     The spinal canal is developmentally normal and widely patent.  There is no spinal stenosis.  There is no cord compression.  The cord is grossly normal in signal intensity given patient motion.  There is no abnormal epidural soft tissue mass or fluid collection.     Findings by level:     T1-2: There is a mild right-sided disc bulge.  There is mild facet joint arthropathy.  There is mild right foraminal stenosis without spinal stenosis.     T2-3: There is a very shallow left paracentral disc protrusion.  There is no spinal canal or significant foraminal stenosis.     T3-4: There is no spinal canal or significant foraminal stenosis.     T4-5: There is mild facet joint arthropathy with a minimal disc bulge.  There is no spinal canal or significant foraminal stenosis.     T5-6: There is a 3 mm left paracentral disc protrusion.  There is mild facet joint arthropathy.  There is no spinal canal or significant foraminal stenosis.     T6-7: There is a shallow broad left paracentral disc protrusion plus facet joint arthropathy.  There is no spinal canal or significant foraminal stenosis.     T7-8: There is minimal disc bulge and facet joint arthropathy.  There is no spinal canal or significant foraminal  stenosis.     T8-9: There is mild facet joint arthropathy.  There is no spinal canal or significant foraminal stenosis.     T9-10: There is facet joint arthropathy and a minimal disc bulge.  There is no spinal canal or significant foraminal stenosis.     T10-11: There is facet joint arthropathy.  There is a minimal disc bulge.  There is ligamentum flavum thickening.  There is no spinal canal or significant foraminal stenosis.     T11-12: There is mild facet joint arthropathy and a minimal disc bulge.  There is no spinal canal or significant foraminal stenosis.     T12-L1: There is no spinal canal or significant foraminal stenosis.     Soft tissues, other: The prevertebral soft tissues are grossly normal.     Impression:     1. The study is mildly motion degraded.  There is, however, no acute or significant abnormality.  There is no fracture or malalignment.  There is mild degenerative change throughout the thoracic spine with minimal bulging of the annulus, mild disc protrusion or facet joint arthropathy at multiple levels but there is no spinal canal or significant foraminal stenosis.  There is no cord or suspected nerve root compression.              MRI Cervical Spine Without Contrast  Order: [id number]  Status:  Final result   Visible to patient:  No (inaccessible in Patient Portal) Next appt:  09/08/2020 at 07:00 AM in Radiology ([facility]) Dx:  Cervicalgia; Ataxia, unspecified  Details    Reading Physician Reading Date Result Priority   Dread Balderrama MD  [phone] 8/25/2020 Routine      Narrative & Impression     EXAMINATION:  MRI CERVICAL SPINE WITHOUT CONTRAST     CLINICAL HISTORY:  Ataxia, infection suspected;Neck pain, abnormal neuro exam;.  Ataxia, unspecified.     TECHNIQUE:  Multiplanar, multisequence MR images of the cervical spine were acquired without the administration of contrast.     COMPARISON:  Plain films of the cervical spine dated 06/12/2018     FINDINGS:  Vertebral column: The study  is mildly motion degraded.  Redemonstrated are postsurgical changes of anterior interbody fusion of C5-6 and C6-7.  There is 3 mm anterolisthesis of C7 on T1.  This was also present on comparison plain films.  There is 2 mm retrolisthesis of C4 on C5 which is exaggerated by osteophyte formation.  There is stable trace anterolisthesis of C3 on C4.  There is no fracture.  Vertebral body height is preserved.  All of the discs are desiccated.  There is marked disc space narrowing at the C4-5 level with only mild disc space narrowing at the C7-T1 level.  There is multilevel endplate osteophyte formation.  The odontoid process is intact.     Spinal canal, cord, epidural space: The spinal canal is developmentally normal.  The cord is normal in signal intensity.  There is mild flattening of the ventral cord surface at the C3-4 level where there is a disc protrusion/osteophyte with minimal flattening of the ventral cord surface also present at the C4-5 level.  Again, cord signal intensity is grossly normal without obvious edema or myelomalacia.  Patient motion does slightly limit evaluation of cord signal.  There is no abnormal epidural soft tissue mass or fluid collection.     Findings by level:     C2-3: There is bilateral facet joint arthropathy with mild uncovertebral spurring and a minimal disc osteophyte complex.  There is no spinal stenosis.  There is mild-to-moderate bilateral foraminal stenosis.     C3-4: There is trace anterolisthesis of C3 on C4. there is a moderate disc protrusion/osteophyte which narrows the subarachnoid space and results in subtle flattening of the ventral cord surface.  There is right greater than left uncovertebral spurring with bilateral facet joint arthropathy.  There is mild spinal stenosis with marked right and moderate left foraminal stenosis.     C4-5: Mild retrolisthesis of C4 on C5 is exaggerated by endplate osteophyte formation.  There is marked disc space narrowing at this level.   There is bilateral uncovertebral spurring and facet joint arthropathy with a broad disc osteophyte complex eccentric to the right.  There is narrowing of the subarachnoid space with flattening of the right ventral cord surface.  There is mild spinal stenosis with severe bilateral, right greater than left, foraminal stenosis.     C5-6: There are postsurgical changes of interbody fusion.  There is mild bilateral uncovertebral spurring and facet joint arthropathy.  There is no spinal stenosis.  There is mild-to-moderate bilateral foraminal stenosis, left greater than right.     C6-7: There are postsurgical changes of interbody fusion.  There is facet joint arthropathy and uncovertebral spurring with posterior bony fusion mass or osteophyte.  There is borderline spinal stenosis without cord compression.  There is marked left and mild-to-moderate right foraminal stenosis.     C7-T1: There is disc space narrowing.  There is 3 mm anterolisthesis of C7 on T1.  There is left greater than right facet joint arthropathy.  There is a minimal disc bulge.  There is no spinal stenosis.  There is mild bilateral foraminal stenosis.     Soft tissues, other: The prevertebral soft tissues are grossly normal.     Impression:     1. There are postsurgical changes of interbody fusion of C5-6 and C6-7.  There is multilevel degenerative disc and facet disease.  Disc space narrowing is present at the C4-5 and C7-T1 levels.  There is also degenerative listhesis at several levels.  There is some degree of disc osteophyte complex, uncovertebral spurring and facet joint arthropathy at multiple levels.  There is mild flattening of the ventral cord surface at the C3-4 and C4-5 levels without obvious cord edema or myelomalacia.  The study is mildly motion degraded which slightly limits cord signal evaluation.  These degenerative changes are discussed in detail by level above.  The pertinent findings are summarized below.  2. At the C2-3 level there  is mild-to-moderate bilateral foraminal stenosis without spinal stenosis.  3. At the C3-4 level there is mild spinal stenosis with marked right and moderate left foraminal stenosis.  4. At the C4-5 level there is mild spinal stenosis with severe bilateral, right greater than left, foraminal stenosis.  5. At the C5-6 level there is mild-to-moderate bilateral foraminal stenosis, left greater than right.  There is no spinal stenosis.  6. At the C6-7 level there is borderline spinal stenosis with marked left and mild-to-moderate right foraminal stenosis.  7. At the C7-T1 level there is mild bilateral foraminal stenosis without spinal stenosis.                 Labs  egfr cr lft's gluc nl    Past Medical History:   Diagnosis Date    Colon polyps     Hearing loss     Hiatal hernia     Hypertension     Prostate disease     Tuberculosis      Past Surgical History:   Procedure Laterality Date    eye lid surgey      HAND SURGERY      HAND SURGERY      HERNIA REPAIR      HERNIA REPAIR      NECK SURGERY      NECK SURGERY      PROSTATE SURGERY      PROSTATECTOMY      SHOULDER SURGERY      SKIN CANCER EXCISION      Skin Cancer removed from his left arm and left shoulder       Family History   Problem Relation Age of Onset    Prostate cancer Brother     Prostate cancer Brother      Social History     Socioeconomic History    Marital status:      Spouse name: Not on file    Number of children: Not on file    Years of education: Not on file    Highest education level: Not on file   Occupational History    Not on file   Social Needs    Financial resource strain: Not on file    Food insecurity     Worry: Not on file     Inability: Not on file    Transportation needs     Medical: Not on file     Non-medical: Not on file   Tobacco Use    Smoking status: Never Smoker    Smokeless tobacco: Never Used   Substance and Sexual Activity    Alcohol use: No     Alcohol/week: 0.0 standard drinks    Drug use:  No    Sexual activity: Not on file   Lifestyle    Physical activity     Days per week: Not on file     Minutes per session: Not on file    Stress: Not on file   Relationships    Social connections     Talks on phone: Not on file     Gets together: Not on file     Attends [employer] service: Not on file     Active member of club or organization: Not on file     Attends meetings of clubs or organizations: Not on file     Relationship status: Not on file   Other Topics Concern    Not on file   Social History Narrative    Not on file       Review of patient's allergies indicates:  No Known Allergies    Current Outpatient Medications:     albuterol (PROVENTIL/VENTOLIN HFA) 90 mcg/actuation inhaler, Inhale 2 puffs into the lungs every 6 (six) hours as needed for Wheezing., Disp: 1 each, Rfl: 11    amLODIPine (NORVASC) 2.5 MG tablet, Take 1 tablet (2.5 mg total) by mouth once daily., Disp: 90 tablet, Rfl: 3    atorvastatin (LIPITOR) 40 MG tablet, Take 1 tablet (40 mg total) by mouth once daily., Disp: 90 tablet, Rfl: 3    fluticasone propionate (FLONASE) 50 mcg/actuation nasal spray, 2 sprays (100 mcg total) by Each Nostril route once daily., Disp: 16 g, Rfl: 11    FLUZONE HIGH-DOSE 2018-19, PF, 180 mcg/0.5 mL vaccine, ADM 0.5ML IM UTD, Disp: , Rfl: 0    gabapentin (NEURONTIN) 100 MG capsule, TAKE ONE CAPSULE BY MOUTH IN THE MORNING, ONE AT NOON AND THREE CAPSULES AT BEDTIME, Disp: 450 capsule, Rfl: 1    ipratropium (ATROVENT) 42 mcg (0.06 %) nasal spray, 2 sprays by Nasal route 4 (four) times daily., Disp: 15 mL, Rfl: 5    LORATADINE (CLARITIN ORAL), Take by mouth once daily., Disp: , Rfl:     minocycline (MINOCIN,DYNACIN) 100 MG capsule, Take 100 mg by mouth 2 (two) times daily., Disp: , Rfl:     miSOPROStoL (CYTOTEC) 100 MCG Tab, Take 1 tablet (100 mcg total) by mouth 2 (two) times daily with meals., Disp: 180 tablet, Rfl: 1    pantoprazole (PROTONIX) 40 MG tablet, TAKE 1 TABLET BY MOUTH ONCE DAILY,  Disp: 90 tablet, Rfl: 3    PREVNAR 13, PF, 0.5 mL Syrg, ADM 0.5ML IM UTD, Disp: , Rfl: 0    triamterene-hydrochlorothiazide 37.5-25 mg (MAXZIDE-25) 37.5-25 mg per tablet, TAKE 1 TABLET BY MOUTH ONCE DAILY, Disp: 90 tablet, Rfl: 3    aspirin (ECOTRIN) 81 MG EC tablet, Take 1 tablet (81 mg total) by mouth once daily., Disp: 90 tablet, Rfl: 3    gabapentin (NEURONTIN) 300 MG capsule, Take 1 capsule (300 mg total) by mouth 3 (three) times daily., Disp: 270 capsule, Rfl: 2    Review of Systems  No nausea, vomiting, fevers, Chills , contipation, diarrhea or sweats    Physical Exam:      Vitals:    09/04/20 1247   BP: (!) 146/90   Pulse: 78     alert and oriented ×4 follows commands answers all questions appropriately,affect wnl  Manual muscle test 4/5 BUE, LLE left ADF 0, L EHL 0, R ADF trace , R EHL 4, , b/l HF and extensors 4 sensation to light touch grossly intact  +muscular atrophy w. indentention of the vastus medialis musculature bilaterally  assymetric girth of biceps, left is smaller  B/l foot drop L>R  Ataxic gait  babinsky down  No clonus  DTR's symmetric hyporeflexic  +brawny skin b/l lower extremities  +non pitting ankle edema L>R        Assessment:  Moderate to severe left carpal tunnel syndrome  Moderate right carpal tunnel syndrome  Severe sensorimotor predominantly axonal neuropathy  Multilevel moderate left cervical polyradiculopathies  Severe left L4/L5 chronic radiculopathy with NO active denervation  Moderate right L4/L5 chronic radiculopathy with NO active denervation   s/p cervical fusion       Plan:  Reviewed imaging  Refer to ortho. For carpal tunnel release  Refer to neurosurgery for possible minimally invasive surgery  Increase neurontin to 300mg tid  Cont. To wear AFO, as it is helping gait  Long discussion had w. pt regarding preventative care of feet w. Severe peripheral neuropathy  Encouraged using a cane to prevent falls.    Greater than 50%of time spent reviewing diagnosis, prognosis  and treatment

## 2020-09-08 ENCOUNTER — HOSPITAL ENCOUNTER (OUTPATIENT)
Dept: RADIOLOGY | Facility: HOSPITAL | Age: 75
Discharge: HOME | End: 2020-09-08
Attending: INTERNAL MEDICINE
Payer: MEDICARE

## 2020-09-08 DIAGNOSIS — R60.0 LEG EDEMA, LEFT: ICD-10-CM

## 2020-09-08 PROCEDURE — 93971 EXTREMITY STUDY: CPT | Mod: 26,LT,, | Performed by: RADIOLOGY

## 2020-09-08 PROCEDURE — 93971 EXTREMITY STUDY: CPT | Mod: TC,LT

## 2020-09-08 PROCEDURE — 93971 US LOWER EXTREMITY VEINS LEFT: ICD-10-PCS | Mod: 26,LT,, | Performed by: RADIOLOGY

## 2020-09-08 NOTE — TELEPHONE ENCOUNTER
Called to schedule an appointment. Patient does not want to schedule appointment until after seeing Dr Arreguin.

## 2020-09-09 ENCOUNTER — OFFICE VISIT (OUTPATIENT)
Dept: ORTHOPEDICS | Facility: CLINIC | Age: 75
End: 2020-09-09
Payer: MEDICARE

## 2020-09-09 VITALS — WEIGHT: 185 LBS | HEIGHT: 67 IN | BODY MASS INDEX: 29.03 KG/M2

## 2020-09-09 DIAGNOSIS — M21.372 FOOT DROP, LEFT: ICD-10-CM

## 2020-09-09 DIAGNOSIS — G56.03 BILATERAL CARPAL TUNNEL SYNDROME: ICD-10-CM

## 2020-09-09 PROCEDURE — 99203 OFFICE O/P NEW LOW 30 MIN: CPT | Mod: S$PBB,,, | Performed by: ORTHOPAEDIC SURGERY

## 2020-09-09 PROCEDURE — 99999 PR PBB SHADOW E&M-EST. PATIENT-LVL III: ICD-10-PCS | Mod: PBBFAC,,, | Performed by: ORTHOPAEDIC SURGERY

## 2020-09-09 PROCEDURE — 99203 PR OFFICE/OUTPT VISIT, NEW, LEVL III, 30-44 MIN: ICD-10-PCS | Mod: S$PBB,,, | Performed by: ORTHOPAEDIC SURGERY

## 2020-09-09 PROCEDURE — 99213 OFFICE O/P EST LOW 20 MIN: CPT | Mod: PBBFAC,PN | Performed by: ORTHOPAEDIC SURGERY

## 2020-09-09 PROCEDURE — 99999 PR PBB SHADOW E&M-EST. PATIENT-LVL III: CPT | Mod: PBBFAC,,, | Performed by: ORTHOPAEDIC SURGERY

## 2020-09-09 NOTE — LETTER
September 9, 2020      Analisa Ruiz DO  [address] [place] [address] [address]  [place] [address]           [facility]  [address] [place] [address] [address]  [facility] [address]  Phone: [phone]          Patient: Michael Ann Jr.   MR Number: [id number]   YOB: 1945   Date of Visit: 9/9/2020       Dear Dr. Analisa Ruiz:    Thank you for referring Michael Ann to me for evaluation. Attached you will find relevant portions of my assessment and plan of care.    If you have questions, please do not hesitate to call me. I look forward to following Michael Ann along with you.    Sincerely,    Kam Sullivan MD    Enclosure  CC:  No Recipients    If you would like to receive this communication electronically, please contact [email or web address] or [phone] to request more information on [email or web address] access.    For providers and/or their staff who would like to refer a patient to [facility], please contact us through our one-stop-shop provider referral line, [facility] , at [phone].    If you feel you have received this communication in error or would no longer like to receive these types of communications, please e-mail [email or web address]

## 2020-09-09 NOTE — PROGRESS NOTES
9/9/2020    Past Medical History:   Diagnosis Date    Colon polyps     Hearing loss     Hiatal hernia     Hypertension     Prostate disease     Tuberculosis        Past Surgical History:   Procedure Laterality Date    eye lid surgey      HAND SURGERY      HAND SURGERY      HERNIA REPAIR      HERNIA REPAIR      NECK SURGERY      NECK SURGERY      PROSTATE SURGERY      PROSTATECTOMY      SHOULDER SURGERY      SKIN CANCER EXCISION      Skin Cancer removed from his left arm and left shoulder         Current Outpatient Medications   Medication Sig    albuterol (PROVENTIL/VENTOLIN HFA) 90 mcg/actuation inhaler Inhale 2 puffs into the lungs every 6 (six) hours as needed for Wheezing.    amLODIPine (NORVASC) 2.5 MG tablet Take 1 tablet (2.5 mg total) by mouth once daily.    atorvastatin (LIPITOR) 40 MG tablet Take 1 tablet (40 mg total) by mouth once daily.    fluticasone propionate (FLONASE) 50 mcg/actuation nasal spray 2 sprays (100 mcg total) by Each Nostril route once daily.    FLUZONE HIGH-DOSE 2018-19, PF, 180 mcg/0.5 mL vaccine ADM 0.5ML IM UTD    gabapentin (NEURONTIN) 100 MG capsule TAKE ONE CAPSULE BY MOUTH IN THE MORNING, ONE AT NOON AND THREE CAPSULES AT BEDTIME    gabapentin (NEURONTIN) 300 MG capsule Take 1 capsule (300 mg total) by mouth 3 (three) times daily.    ipratropium (ATROVENT) 42 mcg (0.06 %) nasal spray 2 sprays by Nasal route 4 (four) times daily.    LORATADINE (CLARITIN ORAL) Take by mouth once daily.    minocycline (MINOCIN,DYNACIN) 100 MG capsule Take 100 mg by mouth 2 (two) times daily.    miSOPROStoL (CYTOTEC) 100 MCG Tab Take 1 tablet (100 mcg total) by mouth 2 (two) times daily with meals.    pantoprazole (PROTONIX) 40 MG tablet TAKE 1 TABLET BY MOUTH ONCE DAILY    PREVNAR 13, PF, 0.5 mL Syrg ADM 0.5ML IM UTD    triamterene-hydrochlorothiazide 37.5-25 mg (MAXZIDE-25) 37.5-25 mg per tablet TAKE 1 TABLET BY MOUTH ONCE DAILY    aspirin (ECOTRIN) 81 MG EC tablet  Take 1 tablet (81 mg total) by mouth once daily.     No current facility-administered medications for this visit.        Review of patient's allergies indicates:  No Known Allergies    Family History   Problem Relation Age of Onset    Prostate cancer Brother     Prostate cancer Brother        Social History     Socioeconomic History    Marital status:      Spouse name: Not on file    Number of children: Not on file    Years of education: Not on file    Highest education level: Not on file   Occupational History    Not on file   Social Needs    Financial resource strain: Not on file    Food insecurity     Worry: Not on file     Inability: Not on file    Transportation needs     Medical: Not on file     Non-medical: Not on file   Tobacco Use    Smoking status: Never Smoker    Smokeless tobacco: Never Used   Substance and Sexual Activity    Alcohol use: No     Alcohol/week: 0.0 standard drinks    Drug use: No    Sexual activity: Not on file   Lifestyle    Physical activity     Days per week: Not on file     Minutes per session: Not on file    Stress: Not on file   Relationships    Social connections     Talks on phone: Not on file     Gets together: Not on file     Attends [employer] service: Not on file     Active member of club or organization: Not on file     Attends meetings of clubs or organizations: Not on file     Relationship status: Not on file   Other Topics Concern    Not on file   Social History Narrative    Not on file       Chief Complaint:   Chief Complaint   Patient presents with    Left Foot - Pain, Initial Visit     Saw Dr. Pablo and she did NCS of the extremities. Left foot drop. Severe left L4/L5 chronic radiculopathy with NO active denervation. Moderate right L4/L5 chronic radiculopathy with NO active denervation    Left Wrist - Pain, Numbness, Initial Assessment     Bilateral CTS. Moderate to severe left carpal tunnel syndrome. Multilevel moderate left cervical  "polyradiculopathies    Right Wrist - Pain, Numbness, Initial Assessment     Moderate right carpal tunnel syndrome  Severe sensorimotor predominantly axonal neuropathy         History of present illness:    This is a 75 y.o. year old male who complains of patient is being seen today with a complaint of bilateral carpal tunnel syndrome and a left foot drop patient has an extensive history of cervical spine problems he has had surgery in his neck with the fusion by Dr. Roldan several years ago patient has an MRI of his cervical spine which shows a C5-C6 and C6-C7 interbody fusion he has degenerative changes above and below the fusion site patient also presents with an MRI of his lumbar spine which shows multilevel degenerative disc and facet arthritis with moderate to severe stenosis on both left and right sides he also has a nerve conduction test which shows bilateral carpal tunnel syndrome with the left greater than the right along with multiple left cervical poly radiculopathies.    Review of Systems:    Constitution: Denies chills, fever, and sweats.  HENT: Denies headaches or blurry vision.  Cardiovascular: Denies chest pain or irregular heart beat.  Respiratory: Denies cough or shortness of breath.  Gastrointestinal: Denies abdominal pain, nausea, or vomiting.  Musculoskeletal:  Denies muscle cramps.  Neurological: Denies dizziness or focal weakness.  Psychiatric/Behavioral: Normal mental status.  Hematologic/Lymphatic: Denies bleeding problem or easy bruising/bleeding.  Skin: Denies rash or suspicious lesions.    Examination:    Vital Signs:    Vitals:    09/09/20 0914   Weight: 83.9 kg (185 lb)   Height: 5' 7" (1.702 m)   PainSc: 0-No pain   PainLoc: Foot       Body mass index is 28.98 kg/m².    This a well-developed, well nourished patient in no acute distress.    Alert and oriented x 3 and cooperative to examination.       Physical Exam:  Neck-        Left upper extremity-patient has some weakness in his " left biceps he has decreased sensation in median nerve distribution of the hand he has evidence of chronic atrophy some interosseous atrophy some mild thenar atrophy      Right upper extremity-patient has decreased sensation in median nerve distribution does not exhibit much in way weakness in the right side       Left lower extremity-patient wears a AFO he has a footdrop on the left      Right lower extremity-patient does have weakness in his dorsiflexion of his right ankle but he is able to dorsiflex ankle with does have weakness    Imaging:  No x-rays today       Assessment: Foot drop, left  -     Ambulatory referral/consult to Orthopedics    Bilateral carpal tunnel syndrome  -     Ambulatory referral/consult to Orthopedics        Plan:  The patient is scheduled to see a neurologist next week will also schedule an appointment with Dr. Vidal neuro surgeon.  I will see him back in about 4 weeks once his cervical spine and lumbar spine has been cleared I can not proceed with a carpal tunnel releases starting with the left wrist 1st      DISCLAIMER: This note may have been dictated using voice recognition software and may contain grammatical errors.     NOTE: Consult report sent to referring provider via [employer] EMR.

## 2020-09-10 ENCOUNTER — TELEPHONE (OUTPATIENT)
Dept: PHYSICAL MEDICINE AND REHAB | Facility: CLINIC | Age: 75
End: 2020-09-10

## 2020-09-10 NOTE — TELEPHONE ENCOUNTER
Dr. Ruiz, was the results given to him?   [As Noted in HPI] : as noted in HPI [Negative] : Heme/Lymph

## 2020-09-10 NOTE — TELEPHONE ENCOUNTER
----- Message from Inocencia Gutierrez sent at 9/10/2020 10:25 AM CDT -----  Contact: wife Izabel Tomas wife is requesting the results of his two xrays done on 8/25 at [facility].  Please mail them to him address in chart. Call back at [phone].  Thanks

## 2020-09-11 NOTE — TELEPHONE ENCOUNTER
I ordered xral's and mri's of cervical, lumbar and thoracic spine.  They will need to request reports through medical records-not sure if we are allowed to print records and send my mail-b/c hippa  Summary of results  arthrtitis throughout  Cervical fusion stable  Lumbar spine worse level is L5/S1- there is severe narrowing d/t arthritis around nerve- this could be causing foot drop- recommend neurosurgery evaluation    pls notify

## 2020-09-14 ENCOUNTER — PATIENT OUTREACH (OUTPATIENT)
Dept: ADMINISTRATIVE | Facility: OTHER | Age: 75
End: 2020-09-14

## 2020-09-15 ENCOUNTER — TELEPHONE (OUTPATIENT)
Dept: NEUROLOGY | Facility: CLINIC | Age: 75
End: 2020-09-15

## 2020-09-15 ENCOUNTER — OFFICE VISIT (OUTPATIENT)
Dept: NEUROLOGY | Facility: CLINIC | Age: 75
End: 2020-09-15
Payer: MEDICARE

## 2020-09-15 VITALS
HEIGHT: 67 IN | WEIGHT: 186.94 LBS | BODY MASS INDEX: 29.34 KG/M2 | DIASTOLIC BLOOD PRESSURE: 71 MMHG | HEART RATE: 91 BPM | TEMPERATURE: 98 F | SYSTOLIC BLOOD PRESSURE: 141 MMHG | RESPIRATION RATE: 16 BRPM

## 2020-09-15 DIAGNOSIS — R20.2 NUMBNESS AND TINGLING IN LEFT HAND: ICD-10-CM

## 2020-09-15 DIAGNOSIS — M21.372 BILATERAL FOOT-DROP: ICD-10-CM

## 2020-09-15 DIAGNOSIS — M51.36 DEGENERATIVE DISC DISEASE, LUMBAR: ICD-10-CM

## 2020-09-15 DIAGNOSIS — R20.0 NUMBNESS AND TINGLING IN LEFT HAND: ICD-10-CM

## 2020-09-15 DIAGNOSIS — R53.1 WEAKNESS: Primary | ICD-10-CM

## 2020-09-15 DIAGNOSIS — M50.30 DEGENERATIVE DISC DISEASE, CERVICAL: ICD-10-CM

## 2020-09-15 DIAGNOSIS — M24.28 LIGAMENTUM FLAVUM HYPERTROPHY: ICD-10-CM

## 2020-09-15 DIAGNOSIS — M21.371 BILATERAL FOOT-DROP: ICD-10-CM

## 2020-09-15 PROCEDURE — 99205 OFFICE O/P NEW HI 60 MIN: CPT | Mod: S$PBB,,, | Performed by: PSYCHIATRY & NEUROLOGY

## 2020-09-15 PROCEDURE — 99999 PR PBB SHADOW E&M-EST. PATIENT-LVL IV: CPT | Mod: PBBFAC,,, | Performed by: PSYCHIATRY & NEUROLOGY

## 2020-09-15 PROCEDURE — 99999 PR PBB SHADOW E&M-EST. PATIENT-LVL IV: ICD-10-PCS | Mod: PBBFAC,,, | Performed by: PSYCHIATRY & NEUROLOGY

## 2020-09-15 PROCEDURE — 99205 PR OFFICE/OUTPT VISIT, NEW, LEVL V, 60-74 MIN: ICD-10-PCS | Mod: S$PBB,,, | Performed by: PSYCHIATRY & NEUROLOGY

## 2020-09-15 PROCEDURE — 99214 OFFICE O/P EST MOD 30 MIN: CPT | Mod: PBBFAC,PN | Performed by: PSYCHIATRY & NEUROLOGY

## 2020-09-15 NOTE — PROGRESS NOTES
Health Maintenance Due   Topic Date Due    Shingles Vaccine (1 of 2) 03/30/1997    Colorectal Cancer Screening  05/21/2020    Influenza Vaccine (1) 08/01/2020     Updates were requested from care everywhere.  Chart was reviewed for overdue Proactive [facility] Encounters (CESIA) topics (CRS, Breast Cancer Screening, Eye exam)  Health Maintenance has been updated.  LINKS immunization registry triggered.  Immunizations were reconciled.

## 2020-09-16 NOTE — TELEPHONE ENCOUNTER
I have called the pt twice and left a voicemail informing him that I was calling from Dr. Arreguin's office to get his EMG scheduled. I instructed pt to please call our office at  to get his appointment scheduled.

## 2020-09-17 ENCOUNTER — TELEPHONE (OUTPATIENT)
Dept: NEUROLOGY | Facility: CLINIC | Age: 75
End: 2020-09-17

## 2020-09-17 NOTE — TELEPHONE ENCOUNTER
Please call to schedule EMG    ----- Message from Rashawn Connelly sent at 9/17/2020  9:26 AM CDT -----  Contact: wife/Izabel Paiz called in and stated she would like a call back to schedule patients EMG.  Call back number is [phone]

## 2020-10-01 ENCOUNTER — OFFICE VISIT (OUTPATIENT)
Dept: NEUROSURGERY | Facility: CLINIC | Age: 75
End: 2020-10-01
Payer: MEDICARE

## 2020-10-01 VITALS
SYSTOLIC BLOOD PRESSURE: 135 MMHG | BODY MASS INDEX: 29.34 KG/M2 | TEMPERATURE: 97 F | HEART RATE: 67 BPM | HEIGHT: 67 IN | DIASTOLIC BLOOD PRESSURE: 82 MMHG | WEIGHT: 186.94 LBS

## 2020-10-01 DIAGNOSIS — M21.371 FOOT DROP, BILATERAL: Primary | ICD-10-CM

## 2020-10-01 DIAGNOSIS — M43.16 SPONDYLOLISTHESIS OF LUMBAR REGION: ICD-10-CM

## 2020-10-01 DIAGNOSIS — M21.372 FOOT DROP, BILATERAL: Primary | ICD-10-CM

## 2020-10-01 DIAGNOSIS — M48.062 LUMBAR STENOSIS WITH NEUROGENIC CLAUDICATION: ICD-10-CM

## 2020-10-01 PROCEDURE — 99205 PR OFFICE/OUTPT VISIT, NEW, LEVL V, 60-74 MIN: ICD-10-PCS | Mod: S$PBB,,, | Performed by: STUDENT IN AN ORGANIZED HEALTH CARE EDUCATION/TRAINING PROGRAM

## 2020-10-01 PROCEDURE — 99205 OFFICE O/P NEW HI 60 MIN: CPT | Mod: S$PBB,,, | Performed by: STUDENT IN AN ORGANIZED HEALTH CARE EDUCATION/TRAINING PROGRAM

## 2020-10-01 PROCEDURE — 99214 OFFICE O/P EST MOD 30 MIN: CPT | Mod: PBBFAC,PN | Performed by: STUDENT IN AN ORGANIZED HEALTH CARE EDUCATION/TRAINING PROGRAM

## 2020-10-01 PROCEDURE — 99999 PR PBB SHADOW E&M-EST. PATIENT-LVL IV: ICD-10-PCS | Mod: PBBFAC,,, | Performed by: STUDENT IN AN ORGANIZED HEALTH CARE EDUCATION/TRAINING PROGRAM

## 2020-10-01 PROCEDURE — 99999 PR PBB SHADOW E&M-EST. PATIENT-LVL IV: CPT | Mod: PBBFAC,,, | Performed by: STUDENT IN AN ORGANIZED HEALTH CARE EDUCATION/TRAINING PROGRAM

## 2020-10-01 NOTE — PROGRESS NOTES
[facility] - Neurosurgery  Clinic Consult     Consult Requested By: Other  PCP: Daniel Baptiste MD    SUBJECTIVE:     Chief Complaint:   Chief Complaint   Patient presents with    Lumbar Spine Pain (L-Spine)     Patient has left foot drop; AFO brace on left foot; patient reports foot drop started around August; c/o unsteady gate       History of Present Illness:  Michael Ann Jr. is a 75 y.o. left handed male with hypertension, hyperlipidemia, history of skin cancer and history prostate cancer status post prostatectomy who presents for evaluation of hand numbness and footdrop.  Patient has a history of 2 level ACDF.  Wife reports patient needed a 4 level ACDF at the time, but they could only perform a 2 level.  Patient denies neck pain.  He denies radicular upper extremity pain.  He experiences numbness in the fingertips of the 1st through 3rd digits bilaterally, left greater than right.  He reports dropping objects.  He denies significant difficulty with hand dexterity.  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  States he is able to  pills without difficulty.  He does have difficulty with small buttons.  He complains of soreness in his low back.  He denies lower extremity pain, numbness, tingling.  He reports unsteady gait for approximately 2 years.  He states this was due to dragging his left foot.  He was diagnosed with left footdrop and received an AFO brace in August.  He states the AFO brace has improved his gait.  He has been seen by multiple for providers.  Previous EMG revealed moderate to severe left carpal tunnel, moderate right carpal tunnel, severe sensorimotor axonal neuropathy, severe left L4-5 chronic radiculopathy and moderate right 4 5 chronic radiculopathy.  He was referred to Dr. Sullivan for his carpal tunnel, but wife reports Dr. Sullivan is concerned of cervical involvement.  He has been evaluated by Dr. Arreguin who is not in agreement with EMG results per his wife.   They states she will be repeating the EMGs and believes his symptoms are due to his lumbar spine.  He is also scheduled for what sounds like a muscle biopsy. Patient states he is concerned about muscular atrophy which has been progressing over 20 years.      Pertinent and recent history, provider evaluations, imaging and data reviewed in EPIC      PHQ 0  Oswestry Disability Index 18    Past Medical History:   Diagnosis Date    Colon polyps     Hearing loss     Hiatal hernia     Hypertension     Prostate disease     Tuberculosis      Past Surgical History:   Procedure Laterality Date    eye lid surgey      HAND SURGERY      HAND SURGERY      HERNIA REPAIR      HERNIA REPAIR      NECK SURGERY      NECK SURGERY      PROSTATE SURGERY      PROSTATECTOMY      SHOULDER SURGERY      SKIN CANCER EXCISION      Skin Cancer removed from his left arm and left shoulder       Family History   Problem Relation Age of Onset    Prostate cancer Brother     Prostate cancer Brother      Social History     Tobacco Use    Smoking status: Never Smoker    Smokeless tobacco: Never Used   Substance Use Topics    Alcohol use: No     Alcohol/week: 0.0 standard drinks    Drug use: No      Review of patient's allergies indicates:  No Known Allergies    Current Outpatient Medications:     albuterol (PROVENTIL/VENTOLIN HFA) 90 mcg/actuation inhaler, Inhale 2 puffs into the lungs every 6 (six) hours as needed for Wheezing., Disp: 1 each, Rfl: 11    amLODIPine (NORVASC) 2.5 MG tablet, Take 1 tablet (2.5 mg total) by mouth once daily., Disp: 90 tablet, Rfl: 3    atorvastatin (LIPITOR) 40 MG tablet, Take 1 tablet (40 mg total) by mouth once daily., Disp: 90 tablet, Rfl: 3    fluticasone propionate (FLONASE) 50 mcg/actuation nasal spray, 2 sprays (100 mcg total) by Each Nostril route once daily., Disp: 16 g, Rfl: 11    FLUZONE HIGH-DOSE 2018-19, PF, 180 mcg/0.5 mL vaccine, ADM 0.5ML IM UTD, Disp: , Rfl: 0    gabapentin  (NEURONTIN) 100 MG capsule, TAKE ONE CAPSULE BY MOUTH IN THE MORNING, ONE AT NOON AND THREE CAPSULES AT BEDTIME, Disp: 450 capsule, Rfl: 1    gabapentin (NEURONTIN) 300 MG capsule, Take 1 capsule (300 mg total) by mouth 3 (three) times daily., Disp: 270 capsule, Rfl: 2    ipratropium (ATROVENT) 42 mcg (0.06 %) nasal spray, 2 sprays by Nasal route 4 (four) times daily., Disp: 15 mL, Rfl: 5    LORATADINE (CLARITIN ORAL), Take by mouth once daily., Disp: , Rfl:     minocycline (MINOCIN,DYNACIN) 100 MG capsule, Take 100 mg by mouth 2 (two) times daily., Disp: , Rfl:     miSOPROStoL (CYTOTEC) 100 MCG Tab, Take 1 tablet (100 mcg total) by mouth 2 (two) times daily with meals., Disp: 180 tablet, Rfl: 1    pantoprazole (PROTONIX) 40 MG tablet, TAKE 1 TABLET BY MOUTH ONCE DAILY, Disp: 90 tablet, Rfl: 3    PREVNAR 13, PF, 0.5 mL Syrg, ADM 0.5ML IM UTD, Disp: , Rfl: 0    triamterene-hydrochlorothiazide 37.5-25 mg (MAXZIDE-25) 37.5-25 mg per tablet, TAKE 1 TABLET BY MOUTH ONCE DAILY, Disp: 90 tablet, Rfl: 3    aspirin (ECOTRIN) 81 MG EC tablet, Take 1 tablet (81 mg total) by mouth once daily., Disp: 90 tablet, Rfl: 3    Review of Systems:   Constitutional: no fever, chills or night sweats. No changes in weight   Eyes: no visual changes   ENT: no nasal congestion or sore throat   Respiratory: no cough or shortness of breath   Cardiovascular: no chest pain or palpitations   Gastrointestinal: no nausea or vomiting   Genitourinary: no hematuria or dysuria   Integument/Breast: no rash or pruritis   Hematologic/Lymphatic: no easy bruising or lymphadenopathy   Musculoskeletal: no arthralgias or myalgias.   Neurological: no seizures or tremors  +weakness, numbness  Behavioral/Psych: no auditory or visual hallucinations   Endocrine: no heat or cold intolerance         OBJECTIVE:     Vital Signs (Most Recent):  Temp: 97.3 °F (36.3 °C) (10/01/20 0928)  Pulse: 67 (10/01/20 0928)  BP: 135/82 (10/01/20 0928)  Estimated body mass  "index is 29.28 kg/m² as calculated from the following:    Height as of this encounter: 5' 7" (1.702 m).    Weight as of this encounter: 84.8 kg (186 lb 15.2 oz).    Physical Exam:   General: well developed, well nourished, no distress.   Neurologic: Alert and oriented. Thought content appropriate. GCS 15.   Language: No aphasia  Speech: No dysarthria  Head: normocephalic, atraumatic  Eyes: EOMI.  Neck: trachea midline, no JVD   Cardiovascular: no LE edema  Pulmonary: normal respirations, no signs of respiratory distress  Abdomen: non-distended  Sensory: intact to light touch throughout  Skin: Skin is warm, dry and intact.    Motor Strength: Moves all extremities spontaneously with good tone. No abnormal movements seen.     Strength  Deltoids Triceps Biceps Wrist Extension Wrist Flexion Hand  Interossei    Upper: R 5-/5 5-/5 5-/5 5/5 5/5 5/5 4/5     L 5-/5 5-/5 5-/5 5/5 5/5 5/5 4/5      Iliopsoas Quadriceps Knee  Flexion Tibialis  anterior Gastro- cnemius EHL Foot Inversion Foot Eversion   Lower: R 5/5 5/5 5/5 1/5 5/5 0/5 5/5 3/5    L 5/5 5/5 5/5 0/5 5/5 0/5 5/5 3/5   AFO left leg     DTR's: 0  Solano: absent  Clonus: absent  Gait: normal    Tandem Gait: abnormal            Cervical Spine: full ROM, no TTP    Lumbar Spine: full ROM, no TTP  Negative tinel's wrist and elbow bilaterally        Conclusion:      Moderate to severe left carpal tunnel syndrome  Moderate right carpal tunnel syndrome  Severe sensorimotor predominantly axonal neuropathy  Multilevel moderate left cervical polyradiculopathies  Severe left L4/L5 chronic radiculopathy with NO active denervation  Moderate right L4/L5 chronic radiculopathy with NO active denervation        ____________________________  Analisa Pablo D.O.     Diagnostic Results:  I have independently reviewed the following imaging:  X-Ray: Reviewed  MRI: Reviewed  rev    MRI and x-rays reviewed\    Cervical MRI, pertinent findings could previous C5-6 and C6-7 anterior " cervical diskectomy and fusion  No complications noted  At C4-5 there is disc osteophyte complex with ligamentum buckling grading moderate spinal stenosis there is no cord displacement or cord signal change  At C3-4 there is anterior disc osteophyte complex a just abutting the cord without displacement or cord signal change  There is no fracture deformity      Thoracic  Lumbar spine  His upper lumbar mild moderate thoracolumbar scoliosis  At L3-4 there is moderate facet lateral recess and proximal foraminal stenosis with no severe impingement of thecal sac  At L4-5 there is a severe facet arthropathy there is a grade 1 spondylolisthesis which is worse on upright x-rays compared to MRI with severe central lateral recess and foraminal stenosis  Is moderate degeneration at L5-S1 without neural impingement        ASSESSMENT/PLAN:     Foot drop, bilateral    Spondylolisthesis of lumbar region    Lumbar stenosis with neurogenic claudication        Michael Duvall Marissa Friedman is a 75 y.o. male    I presents with complex history status post anterior cervical diskectomy and fusion which was uncomplicated  He presents today with some residual intermittent paresthesias in his hands.  He denies any radiculopathy he denies any motor deficit.  He has good dexterity and coordination without long track signs.  This is not a functional limitation.     Regard to his lumbar spine.  He has a spondylolisthesis at L4-5 with circumferential stenosis.  He has a chronic severe left foot drop which he is using AFO for.  He remains to ambulate independently  He has not had progression of a right foot drop she has severe and near complete  He does have loss of eversion however had an EMG nerve conduction study that correlated with an L5 radiculopathy without active reinnervation, there was no pronator neuropathy present  We had a thorough discussion and review he has mass effect his left foot drop is complete he has progression of a very  severe right foot drop.  He does understand the anatomical treatment is a decompression which would require an L4-5 facetectomy and fusion, his stenosis above possibly L3-5  Surgical goal is to prevent deterioration plantar flexion ambulation given a chance for improvement in his right foot function.  He does understand that the left is chronic and will not improve the right is severe and he is at risk of failure for improvement even with surgery the however he 75 otherwise healthy functional has residual severe stenosis with a goal to prevent neurological decline maintain we has continue ambulate  He is very hesitant consider surgery especially in light that he may not improve his footdrop he understands indication risk benefits of surveillance progression.  He like to proceed with an additional EMG nerve conduction study and see Dr. Arreguin for opinion.  Showed some secondary pathology be contributing in addition  Was thoroughly counseled understands options          The diagnosis, goals, limitations, risks and benefits of surgery and alternative treatment options where discussed at length (pros/cons). All questions/concerns were addressed. The patient has verbalized a good understanding of the diagnosis, the planned procedure, anticipated post-operative course, overall expectations, and risks including but not limited to:  nerve root injury/paralysis, death, nerve injury leading to pain or neurological deficit, csf leak, vascular injury or serious bleeding/need for blood product transfusion/stroke, wrong level surgery, hardware/instrumenteation misplacement, chronic pain/failure to improve or worsening of symptoms, infection, pseudoarthrosis, hardware failure, need for further surgery at the same or different levels; medical (eg Heart attack, blood clot, infection) and anesthetic complications.  Patient verbalized understanding of plan. Encouraged to call with any questions or concerns.     This note was  partially dictated using voice recognition software, so please excuse any errors that were not corrected.

## 2020-10-02 ENCOUNTER — PES CALL (OUTPATIENT)
Dept: ADMINISTRATIVE | Facility: CLINIC | Age: 75
End: 2020-10-02

## 2020-10-05 ENCOUNTER — PATIENT OUTREACH (OUTPATIENT)
Dept: ADMINISTRATIVE | Facility: HOSPITAL | Age: 75
End: 2020-10-05

## 2020-10-05 NOTE — PROGRESS NOTES
Chart review completed 10/05/2020.  Care Everywhere updates requested and reviewed.  Immunizations reconciled. Media reports reviewed.  Duplicate HM overrides and  orders removed.  Overdue HM topic chart audit and/or requested.  Overdue lab testing linked to upcoming lab appointments if applies.    LETTER MAILED    Health Maintenance Due   Topic Date Due    Shingles Vaccine (1 of 2) 1997    Colorectal Cancer Screening  2019    Influenza Vaccine (1) 2020

## 2020-10-05 NOTE — LETTER
October 5, 2020    Michael Jassotomy Friedman  [address] [place] [address]  [place] [address]             [facility]  [address]  [facility] [address]  Phone: [phone] [facility] is committed to your overall health.  To help you get the most out of each of your visits, we will review your information to make sure you are up to date on all of your recommended tests and/or procedures.      Dr. Daniel Baptiste MD has found that your chart shows you may be due for a:    COLORECTAL CANCER SCREENING       If you have had any of the above done at another facility, please bring the records or information with you so that your record at [facility] will be complete.  If you would like to schedule any of these, please contact the clinic at [phone].    If you are currently taking medication, please bring it with you to your appointment for review.    Also, if you have any type of Advanced Directives, please bring them with you to your office visit so we may scan them into your chart.    Thank You,    Your [facility] Team,  MD Natalie Chappell LPN Clinical Care Coordinator  [facility]  [address] [address]  Phone [phone]  Fax: [phone]

## 2020-10-13 ENCOUNTER — LAB VISIT (OUTPATIENT)
Dept: LAB | Facility: HOSPITAL | Age: 75
End: 2020-10-13
Attending: INTERNAL MEDICINE
Payer: MEDICARE

## 2020-10-13 DIAGNOSIS — M21.371 BILATERAL FOOT-DROP: ICD-10-CM

## 2020-10-13 DIAGNOSIS — R20.0 NUMBNESS AND TINGLING IN LEFT HAND: ICD-10-CM

## 2020-10-13 DIAGNOSIS — I10 ESSENTIAL HYPERTENSION: ICD-10-CM

## 2020-10-13 DIAGNOSIS — R53.82 CHRONIC FATIGUE AND MALAISE: ICD-10-CM

## 2020-10-13 DIAGNOSIS — M21.372 BILATERAL FOOT-DROP: ICD-10-CM

## 2020-10-13 DIAGNOSIS — R53.81 CHRONIC FATIGUE AND MALAISE: ICD-10-CM

## 2020-10-13 DIAGNOSIS — R20.2 NUMBNESS AND TINGLING IN LEFT HAND: ICD-10-CM

## 2020-10-13 DIAGNOSIS — R53.1 WEAKNESS: ICD-10-CM

## 2020-10-13 LAB
ALBUMIN SERPL BCP-MCNC: 3.7 G/DL (ref 3.5–5.2)
ALP SERPL-CCNC: 79 U/L (ref 55–135)
ALT SERPL W/O P-5'-P-CCNC: 28 U/L (ref 10–44)
ANION GAP SERPL CALC-SCNC: 12 MMOL/L (ref 8–16)
AST SERPL-CCNC: 26 U/L (ref 10–40)
BASOPHILS # BLD AUTO: 0.06 K/UL (ref 0–0.2)
BASOPHILS NFR BLD: 0.9 % (ref 0–1.9)
BILIRUB SERPL-MCNC: 1.4 MG/DL (ref 0.1–1)
BUN SERPL-MCNC: 15 MG/DL (ref 8–23)
CALCIUM SERPL-MCNC: 9.1 MG/DL (ref 8.7–10.5)
CHLORIDE SERPL-SCNC: 100 MMOL/L (ref 95–110)
CK SERPL-CCNC: 403 U/L (ref 20–200)
CO2 SERPL-SCNC: 29 MMOL/L (ref 23–29)
CREAT SERPL-MCNC: 0.8 MG/DL (ref 0.5–1.4)
CRP SERPL-MCNC: 4.9 MG/L (ref 0–8.2)
DIFFERENTIAL METHOD: ABNORMAL
EOSINOPHIL # BLD AUTO: 0.1 K/UL (ref 0–0.5)
EOSINOPHIL NFR BLD: 2 % (ref 0–8)
ERYTHROCYTE [DISTWIDTH] IN BLOOD BY AUTOMATED COUNT: 13.9 % (ref 11.5–14.5)
ERYTHROCYTE [SEDIMENTATION RATE] IN BLOOD BY WESTERGREN METHOD: 2 MM/HR (ref 0–10)
EST. GFR  (AFRICAN AMERICAN): >60 ML/MIN/1.73 M^2
EST. GFR  (NON AFRICAN AMERICAN): >60 ML/MIN/1.73 M^2
GLUCOSE SERPL-MCNC: 85 MG/DL (ref 70–110)
HCT VFR BLD AUTO: 48.8 % (ref 40–54)
HGB BLD-MCNC: 16.5 G/DL (ref 14–18)
IMM GRANULOCYTES # BLD AUTO: 0.02 K/UL (ref 0–0.04)
IMM GRANULOCYTES NFR BLD AUTO: 0.3 % (ref 0–0.5)
LYMPHOCYTES # BLD AUTO: 1.9 K/UL (ref 1–4.8)
LYMPHOCYTES NFR BLD: 28.1 % (ref 18–48)
MCH RBC QN AUTO: 32.3 PG (ref 27–31)
MCHC RBC AUTO-ENTMCNC: 33.8 G/DL (ref 32–36)
MCV RBC AUTO: 96 FL (ref 82–98)
MONOCYTES # BLD AUTO: 0.7 K/UL (ref 0.3–1)
MONOCYTES NFR BLD: 10.8 % (ref 4–15)
NEUTROPHILS # BLD AUTO: 4 K/UL (ref 1.8–7.7)
NEUTROPHILS NFR BLD: 57.9 % (ref 38–73)
NRBC BLD-RTO: 0 /100 WBC
PLATELET # BLD AUTO: 221 K/UL (ref 150–350)
PMV BLD AUTO: 11 FL (ref 9.2–12.9)
POTASSIUM SERPL-SCNC: 3.3 MMOL/L (ref 3.5–5.1)
PROT SERPL-MCNC: 7 G/DL (ref 6–8.4)
RBC # BLD AUTO: 5.11 M/UL (ref 4.6–6.2)
SARS-COV-2 IGG SERPLBLD QL IA.RAPID: NEGATIVE
SODIUM SERPL-SCNC: 141 MMOL/L (ref 136–145)
WBC # BLD AUTO: 6.86 K/UL (ref 3.9–12.7)

## 2020-10-13 PROCEDURE — 36415 COLL VENOUS BLD VENIPUNCTURE: CPT | Mod: PO

## 2020-10-13 PROCEDURE — 86140 C-REACTIVE PROTEIN: CPT

## 2020-10-13 PROCEDURE — 82085 ASSAY OF ALDOLASE: CPT

## 2020-10-13 PROCEDURE — 86769 SARS-COV-2 COVID-19 ANTIBODY: CPT

## 2020-10-13 PROCEDURE — 85025 COMPLETE CBC W/AUTO DIFF WBC: CPT

## 2020-10-13 PROCEDURE — 82550 ASSAY OF CK (CPK): CPT

## 2020-10-13 PROCEDURE — 86038 ANTINUCLEAR ANTIBODIES: CPT

## 2020-10-13 PROCEDURE — 85651 RBC SED RATE NONAUTOMATED: CPT | Mod: PO

## 2020-10-13 PROCEDURE — 80053 COMPREHEN METABOLIC PANEL: CPT

## 2020-10-14 LAB
ALDOLASE SERPL-CCNC: 7.5 U/L (ref 1.2–7.6)
ANA SER QL IF: NORMAL

## 2020-10-15 ENCOUNTER — PATIENT OUTREACH (OUTPATIENT)
Dept: ADMINISTRATIVE | Facility: OTHER | Age: 75
End: 2020-10-15

## 2020-10-15 NOTE — PROGRESS NOTES
Health Maintenance Due   Topic Date Due    Shingles Vaccine (1 of 2) 03/30/1997    Colorectal Cancer Screening  05/22/2019    Influenza Vaccine (1) 08/01/2020     Updates were requested from care everywhere.  Chart was reviewed for overdue Proactive [facility] Encounters (CESIA) topics (CRS, Breast Cancer Screening, Eye exam)  Health Maintenance has been updated.  LINKS immunization registry triggered.  Immunizations were reconciled.

## 2020-10-16 ENCOUNTER — PROCEDURE VISIT (OUTPATIENT)
Dept: NEUROLOGY | Facility: CLINIC | Age: 75
End: 2020-10-16
Payer: MEDICARE

## 2020-10-16 DIAGNOSIS — M21.372 BILATERAL FOOT-DROP: ICD-10-CM

## 2020-10-16 DIAGNOSIS — M21.371 BILATERAL FOOT-DROP: ICD-10-CM

## 2020-10-16 DIAGNOSIS — M54.12 CERVICAL RADICULOPATHY: ICD-10-CM

## 2020-10-16 DIAGNOSIS — G60.9 IDIOPATHIC PERIPHERAL NEUROPATHY: Primary | ICD-10-CM

## 2020-10-16 DIAGNOSIS — R53.1 WEAKNESS: ICD-10-CM

## 2020-10-16 DIAGNOSIS — M54.17 LUMBOSACRAL RADICULOPATHY: ICD-10-CM

## 2020-10-16 PROCEDURE — 95911 PR NERVE CONDUCTION STUDY; 9-10 STUDIES: ICD-10-PCS | Mod: 26,S$PBB,, | Performed by: PSYCHIATRY & NEUROLOGY

## 2020-10-16 PROCEDURE — 95911 NRV CNDJ TEST 9-10 STUDIES: CPT | Mod: PBBFAC,PN | Performed by: PSYCHIATRY & NEUROLOGY

## 2020-10-16 PROCEDURE — 95886 MUSC TEST DONE W/N TEST COMP: CPT | Mod: 26,S$PBB,, | Performed by: PSYCHIATRY & NEUROLOGY

## 2020-10-16 PROCEDURE — 95886 MUSC TEST DONE W/N TEST COMP: CPT | Mod: PBBFAC,PN | Performed by: PSYCHIATRY & NEUROLOGY

## 2020-10-16 PROCEDURE — 95886 PR EMG COMPLETE, W/ NERVE CONDUCTION STUDIES, 5+ MUSCLES: ICD-10-PCS | Mod: 26,S$PBB,, | Performed by: PSYCHIATRY & NEUROLOGY

## 2020-10-16 PROCEDURE — 95911 NRV CNDJ TEST 9-10 STUDIES: CPT | Mod: 26,S$PBB,, | Performed by: PSYCHIATRY & NEUROLOGY

## 2020-10-16 NOTE — PROGRESS NOTES
CK is somewhat elevated at 400.  The remainder of the labs are normal.  Await repeat EMG before determining if any other labs will be beneficial.

## 2020-10-19 ENCOUNTER — OFFICE VISIT (OUTPATIENT)
Dept: FAMILY MEDICINE | Facility: CLINIC | Age: 75
End: 2020-10-19
Payer: MEDICARE

## 2020-10-19 VITALS
WEIGHT: 185.44 LBS | HEART RATE: 78 BPM | RESPIRATION RATE: 16 BRPM | DIASTOLIC BLOOD PRESSURE: 82 MMHG | SYSTOLIC BLOOD PRESSURE: 128 MMHG | OXYGEN SATURATION: 95 % | BODY MASS INDEX: 29.1 KG/M2 | HEIGHT: 67 IN | TEMPERATURE: 99 F

## 2020-10-19 DIAGNOSIS — M21.372 LEFT FOOT DROP: Primary | ICD-10-CM

## 2020-10-19 DIAGNOSIS — M54.12 CERVICAL RADICULOPATHY: ICD-10-CM

## 2020-10-19 DIAGNOSIS — R74.8 ELEVATED CK: ICD-10-CM

## 2020-10-19 DIAGNOSIS — G56.00 CARPAL TUNNEL SYNDROME, UNSPECIFIED LATERALITY: ICD-10-CM

## 2020-10-19 DIAGNOSIS — I10 ESSENTIAL HYPERTENSION: ICD-10-CM

## 2020-10-19 PROCEDURE — 99214 PR OFFICE/OUTPT VISIT, EST, LEVL IV, 30-39 MIN: ICD-10-PCS | Mod: S$GLB,,, | Performed by: INTERNAL MEDICINE

## 2020-10-19 PROCEDURE — 99214 OFFICE O/P EST MOD 30 MIN: CPT | Mod: S$GLB,,, | Performed by: INTERNAL MEDICINE

## 2020-10-19 NOTE — PROGRESS NOTES
"Subjective:      4:31 PM     Patient ID: Michael Ann Jr. is a 75 y.o. male.    Chief Complaint: Hypertension (labs,no refills needed)    HPI     The patient has severe carpal tunnel syndrome but also neck problems.  Renal question was whether was carpal tunnel syndrome or radiculopathy.  Neurology wants to repeat the EMG with a different focus on not clear with they want.  He did have an elevation of the CPK to 440.    Patient's foot pot drop appears to be due to full lumbar disc disease.  Several foramen on the left are severely narrow.      CHIEF COMPLAINT: Hypertension  HPI:     ONSET:      QUALITY/COURSE:   Unchanged.     INTENSITY/SEVERITY:  Average blood pressure is unknown.      MODIFIERS/TREATMENTS:  Taking medications: yes. .High sodium intake: no. alcohol: no      The following symptoms are positive only if BOLDED, otherwise are negative.      SYMPTOMS/RELATED: Possible medication side effects include:   Depression..  . Cough. . Constipation.    REVIEW OF SYMPTOMS: . Weight_loss . Weight_gain . Leg_cramps ..    TARGET ORGAN DAMAGE:: angina/ prior myocardial infarction, chronic kidney disease, heart failure, left ventricular hypertrophy, peripheral artery disease, prior coronary revascularization, retinopathy, stroke. transient ischemic attack.      Review of Systems   Eyes: Negative for visual disturbance.   Respiratory: Negative for chest tightness and shortness of breath.    Cardiovascular: Negative for chest pain and leg swelling.   Neurological: Negative for dizziness, syncope, weakness and headaches.   Psychiatric/Behavioral: Negative for dysphoric mood. The patient is not nervous/anxious.          Objective:      Vitals:    10/19/20 1607   BP: 128/82   Pulse: 78   Resp: 16   Temp: 98.6 °F (37 °C)   TempSrc: Oral   SpO2: 95%   Weight: 84.1 kg (185 lb 6.5 oz)   Height: 5' 7" (1.702 m)   PainSc: 0-No pain     Physical Exam  Vitals signs and nursing note reviewed.   Constitutional:       " Appearance: He is well-developed.   Cardiovascular:      Rate and Rhythm: Normal rate and regular rhythm.      Heart sounds: Normal heart sounds.   Pulmonary:      Effort: Pulmonary effort is normal.      Breath sounds: Normal breath sounds.   Abdominal:      Palpations: Abdomen is soft.      Tenderness: There is no abdominal tenderness.   Neurological:      Mental Status: He is alert.   Psychiatric:         Behavior: Behavior normal.         Thought Content: Thought content normal.       Recent Results (from the past 1008 hour(s))   CBC auto differential    Collection Time: 10/13/20  7:35 AM   Result Value Ref Range    WBC 6.86 3.90 - 12.70 K/uL    RBC 5.11 4.60 - 6.20 M/uL    Hemoglobin 16.5 14.0 - 18.0 g/dL    Hematocrit 48.8 40.0 - 54.0 %    Mean Corpuscular Volume 96 82 - 98 fL    Mean Corpuscular Hemoglobin 32.3 (H) 27.0 - 31.0 pg    Mean Corpuscular Hemoglobin Conc 33.8 32.0 - 36.0 g/dL    RDW 13.9 11.5 - 14.5 %    Platelets 221 150 - 350 K/uL    MPV 11.0 9.2 - 12.9 fL    Immature Granulocytes 0.3 0.0 - 0.5 %    Gran # (ANC) 4.0 1.8 - 7.7 K/uL    Immature Grans (Abs) 0.02 0.00 - 0.04 K/uL    Lymph # 1.9 1.0 - 4.8 K/uL    Mono # 0.7 0.3 - 1.0 K/uL    Eos # 0.1 0.0 - 0.5 K/uL    Baso # 0.06 0.00 - 0.20 K/uL    nRBC 0 0 /100 WBC    Gran% 57.9 38.0 - 73.0 %    Lymph% 28.1 18.0 - 48.0 %    Mono% 10.8 4.0 - 15.0 %    Eosinophil% 2.0 0.0 - 8.0 %    Basophil% 0.9 0.0 - 1.9 %    Differential Method Automated    Comprehensive metabolic panel    Collection Time: 10/13/20  7:35 AM   Result Value Ref Range    Sodium 141 136 - 145 mmol/L    Potassium 3.3 (L) 3.5 - 5.1 mmol/L    Chloride 100 95 - 110 mmol/L    CO2 29 23 - 29 mmol/L    Glucose 85 70 - 110 mg/dL    BUN, Bld 15 8 - 23 mg/dL    Creatinine 0.8 0.5 - 1.4 mg/dL    Calcium 9.1 8.7 - 10.5 mg/dL    Total Protein 7.0 6.0 - 8.4 g/dL    Albumin 3.7 3.5 - 5.2 g/dL    Total Bilirubin 1.4 (H) 0.1 - 1.0 mg/dL    Alkaline Phosphatase 79 55 - 135 U/L    AST 26 10 - 40 U/L     ALT 28 10 - 44 U/L    Anion Gap 12 8 - 16 mmol/L    eGFR if African American >60.0 >60 mL/min/1.73 m^2    eGFR if non African American >60.0 >60 mL/min/1.73 m^2   COVID-19 (SARS CoV-2) IgG Antibody    Collection Time: 10/13/20  7:35 AM   Result Value Ref Range    Antibody SARS CoV-2 Negative Negative   CK    Collection Time: 10/13/20  7:35 AM   Result Value Ref Range     (H) 20 - 200 U/L   ALDOLASE    Collection Time: 10/13/20  7:35 AM   Result Value Ref Range    Aldolase 7.5 1.2 - 7.6 U/L   Sedimentation rate    Collection Time: 10/13/20  7:35 AM   Result Value Ref Range    Sed Rate 2 0 - 10 mm/Hr   C-REACTIVE PROTEIN    Collection Time: 10/13/20  7:35 AM   Result Value Ref Range    CRP 4.9 0.0 - 8.2 mg/L   DWAYNE    Collection Time: 10/13/20  7:35 AM   Result Value Ref Range    DWAYNE Screen Negative <1:80 Negative <1:80          Assessment:       1. Left foot drop    2. Carpal tunnel syndrome, unspecified laterality    3. Essential hypertension    4. Cervical radiculopathy    5. Elevated CK          Plan:       Left foot drop    Carpal tunnel syndrome, unspecified laterality    Essential hypertension  -     CBC auto differential; Future; Expected date: 10/19/2020  -     CK; Future; Expected date: 10/19/2020  -     Comprehensive Metabolic Panel; Future; Expected date: 10/19/2020  -     Lipid Panel; Future; Expected date: 10/19/2020    Cervical radiculopathy    Elevated CK  -     CK; Future; Expected date: 10/19/2020      No follow-ups on file.

## 2020-10-19 NOTE — PATIENT INSTRUCTIONS
Thank you for choosing [facility].     Please fill out the patient experience survey.    Avoid  alcohol  Low-Salt Diet  This diet removes foods that are high in salt. It also limits the amount of salt you use when cooking. It is most often used for people with high blood pressure, edema (fluid retention), and kidney, liver, or heart disease.  Table salt contains the mineral sodium. Your body needs sodium to work normally. But too much sodium can make your health problems worse. Your healthcare provider is recommending a low-salt (also called low-sodium) diet for you. Your total daily allowance of salt is 1,500 to 2,300 milligrams (mg). It is less than 1 teaspoon of table salt. This means you can have only about 500 to 700 mg of sodium at each meal. People with certain health problems should limit salt intake to the lower end of the recommended range.    When you cook, don´t add much salt. If you can cook without using salt, even better. Don´t add salt to your food at the table.  When shopping, read food labels. Salt is often called sodium on the label. Choose foods that are salt-free, low salt, or very low salt. Note that foods with reduced salt may not lower your salt intake enough.    Beans, potatoes, and pasta  Ok: Dry beans, split peas, lentils, potatoes, rice, macaroni, pasta, spaghetti without added salt  Avoid: Potato chips, tortilla chips, and similar products  Breads and cereals  Ok: Low-sodium breads, rolls, cereals, and cakes; low-salt crackers, matzo crackers  Avoid: Salted crackers, pretzels, popcorn, Gabonese toast, pancakes, muffins  Dairy  Ok: Milk, chocolate milk, hot chocolate mix, low-salt cheeses, and yogurt  Avoid: Processed cheese and cheese spreads; Roquefort, Camembert, and cottage cheese; buttermilk, instant breakfast drink  Desserts  Ok: Ice cream, frozen yogurt, juice bars, gelatin, cookies and pies, sugar, honey, jelly, hard candy  Avoid: Most pies, cakes and cookies prepared or processed  with salt; instant pudding  Drinks  Ok: Tea, coffee, fizzy (carbonated) drinks, juices  Avoid: Flavored coffees, electrolyte replacement drinks, sports drinks  Meats  Ok: All fresh meat, fish, poultry, low-salt tuna, eggs, egg substitute  Avoid: Smoked, pickled, brine-cured, or salted meats and fish. This includes barraza, chipped beef, corned beef, hot dogs, deli meats, ham, kosher meats, salt pork, sausage, canned tuna, salted codfish, smoked salmon, herring, sardines, or anchovies.  Seasonings and spices  Ok: Most seasonings are okay. Good substitutes for salt include: fresh herb blends, hot sauce, lemon, garlic, barton, vinegar, dry mustard, parsley, cilantro, horseradish, tomato paste, regular margarine, mayonnaise, unsalted butter, cream cheese, vegetable oil, cream, low-salt salad dressing and gravy.  Avoid: Regular ketchup, relishes, pickles, soy sauce, teriyaki sauce, Worcestershire sauce, BBQ sauce, tartar sauce, meat tenderizer, chili sauce, regular gravy, regular salad dressing, salted butter  Soups  Ok: Low-salt soups and broths made with allowed foods  Avoid: Bouillon cubes, soups with smoked or salted meats, regular soup and broth  Vegetables  Ok: Most vegetables are okay; also low-salt tomato and vegetable juices  Avoid: Sauerkraut and other brine-soaked vegetables; pickles and other pickled vegetables; tomato juice, olives  © 9345-6308 [employer]. [address] [place] [address], [place] [address]. All rights reserved. This information is not intended as a substitute for professional medical care. Always follow your healthcare professional's instructions.

## 2020-10-23 ENCOUNTER — PROCEDURE VISIT (OUTPATIENT)
Dept: NEUROLOGY | Facility: CLINIC | Age: 75
End: 2020-10-23
Payer: MEDICARE

## 2020-10-23 DIAGNOSIS — R53.1 WEAKNESS: ICD-10-CM

## 2020-10-23 DIAGNOSIS — M54.12 CERVICAL RADICULOPATHY: ICD-10-CM

## 2020-10-23 DIAGNOSIS — M21.372 BILATERAL FOOT-DROP: ICD-10-CM

## 2020-10-23 DIAGNOSIS — G60.9 IDIOPATHIC PERIPHERAL NEUROPATHY: Primary | ICD-10-CM

## 2020-10-23 DIAGNOSIS — M54.16 LUMBAR RADICULOPATHY: ICD-10-CM

## 2020-10-23 DIAGNOSIS — M21.371 BILATERAL FOOT-DROP: ICD-10-CM

## 2020-10-23 PROCEDURE — 95886 MUSC TEST DONE W/N TEST COMP: CPT | Mod: 26,S$PBB,, | Performed by: PSYCHIATRY & NEUROLOGY

## 2020-10-23 PROCEDURE — 95886 PR EMG COMPLETE, W/ NERVE CONDUCTION STUDIES, 5+ MUSCLES: ICD-10-PCS | Mod: 26,S$PBB,, | Performed by: PSYCHIATRY & NEUROLOGY

## 2020-10-23 PROCEDURE — 95911 NRV CNDJ TEST 9-10 STUDIES: CPT | Mod: PBBFAC,PN | Performed by: PSYCHIATRY & NEUROLOGY

## 2020-10-23 PROCEDURE — 95886 MUSC TEST DONE W/N TEST COMP: CPT | Mod: PBBFAC,PN | Performed by: PSYCHIATRY & NEUROLOGY

## 2020-10-23 PROCEDURE — 95911 NRV CNDJ TEST 9-10 STUDIES: CPT | Mod: 26,S$PBB,, | Performed by: PSYCHIATRY & NEUROLOGY

## 2020-10-23 PROCEDURE — 95911 PR NERVE CONDUCTION STUDY; 9-10 STUDIES: ICD-10-PCS | Mod: 26,S$PBB,, | Performed by: PSYCHIATRY & NEUROLOGY

## 2020-11-08 NOTE — PROCEDURES
[facility]   [facility]  [address] [facility] [place] [address]  [phone]             Full Name: Michael Ann Gender: Male  Patient ID: [id number] YOB: 1945      Visit Date: 10/23/2020 08:10  Age: 75 Years 6 Months Old  Examining Physician: Roxana Arreguin D.O., ABPN, AOBNP, ABEM   Referring Physician: Roxana Arreguin D.O.   Technologist: Sunshine BANEGAS   Height: 5 feet 8 inch  History: Patient reports numbness on the tip of the fingers of the hands. He has weakness and imbalance in the legs. He has bilateral foot drop, worse on the left.  He also has a history of cervical spine surgery. Evaluate the left upper and lower extremities for radiculopathy vs neuropathy.      Sensory NCS      Nerve / Sites Rec. Site Onset Lat Peak Lat NP Amp Segments Distance Velocity     ms ms µV  cm m/s   L Median - Digit II (Antidromic)      Wrist Dig II 3.13 4.01 2.1 Wrist - Dig II 13 42      Ref.   ?3.80 ?10.0 Ref.  ?50   L Ulnar - Digit V (Antidromic)      Wrist Dig V 2.86 3.49 3.5 Wrist - Dig V 11 38      Ref.   ?3.20 ?10.0 Ref.  ?50   L Radial - Anatomical snuff box (Forearm)      Forearm Wrist 2.08 2.76 5.2 Forearm - Wrist 10 48      Ref.   ?2.80 ?10.0 Ref.     L Sural - Ankle (Calf)      Calf Ankle NR NR NR Calf - Ankle 14 NR      Ref.   ?4.60 ?3.0 Ref.  ?40   L Superficial peroneal - Ankle      Lat leg Ankle NR NR NR Lat leg - Ankle 12 NR      Ref.   ?4.60 ?3.0 Ref.         Motor NCS      Nerve / Sites Muscle Latency Ref. Amplitude Ref. Amp % Duration Segments Distance Lat Diff Velocity Ref.     ms ms mV mV % ms  cm ms m/s m/s   L Median - APB      Wrist APB 3.80 ?4.00 6.6 ?5.0 100 6.77 Wrist - APB          Elbow APB 9.17  6.6  101 7.45 Elbow - Wrist 25.5 5.36 48 ?50   L Ulnar - ADM      Wrist ADM 2.34 ?3.10 9.1 ?7.0 100 6.82 Wrist - ADM          B.Elbow ADM 6.88  8.8  96.7 6.82 B.Elbow - Wrist 24 4.53 53 ?50      A.Elbow ADM 8.96  7.4  82.1 7.03  A.Elbow - B.Elbow 11 2.08 53    L Peroneal - EDB      Ankle EDB NR ?6.00 NR ?2.5 NR NR Ankle - EDB       L Peroneal - Tib Ant      Fib Head Tib Ant 3.02 ?4.50 1.0 ?3.0 100 6.77 Fib Head - Tib Ant          Pop fossa Tib Ant 5.05  1.2  122 7.24 Pop fossa - Fib Head 9 2.03 44 ?40   L Tibial - AH      Ankle AH NR ?6.00 NR ?2.5 NR NR Ankle - AH           EMG Summary Table     Spontaneous Recruitment Activation Duration Amplitude Polyphasia Comment   Muscle Ins Act Fib Fasc Pattern - - - - -   L. Tibialis anterior Inc +2 +1 Mk Dec Poor N/+1 -1 N/+1 Nascent Unit   L. Gastrocnemius (Medial head) Inc Occ 0 Mod Dec Fair N/-1 -1 Normal Normal   L. Extensor hallucis longus Dec 0 0 None None    No Motor Units   L. Flexor digitorum longus Normal 0 0 Mk Dec Fair N/+1 +1 N/+1 Normal   L. Vastus medialis Normal 0 0 Mod Dec Normal +1 +1 Normal Normal   L. Tensor fasciae latae Normal 0 0 Sl Dec Normal N/+1 +1 +1 Normal   L. Biceps femoris (short head) Normal 0 0 Sl Dec Normal +1 N/+1 +1 Normal   L. Lumbar paraspinals Normal 0 0 Normal Normal Normal Normal Normal Normal   L. First dorsal interosseous Normal 0 0 Sl Dec Normal N/+1 +1 +1 Normal   L. Abductor pollicis brevis Normal 0 0 Sl Dec Normal N/+1 +1 +1 Normal   L. Pronator teres Normal 0 0 Sl Dec Normal +1 +1 N/+1 Normal   L. Extensor digitorum communis Normal 0 0 Normal Normal Normal Normal Normal Normal   L. Biceps brachii Normal 0 0 Sl Dec Normal N/+1 +1 N/+1 Normal   L. Triceps brachii Normal 0 0 Sl Dec Normal +1 +1 N/+1 Normal   L. Deltoid Normal 0 0 Mod Dec Normal N/+1 +1 N/+1 Normal   L. Cervical paraspinals Normal 0 0 Normal Normal Normal Normal Normal Normal         Summary:  Nerve conduction studies were performed on the left upper and lower extremities.  Left median and ulnar sensory responses were prolonged with reduced amplitudes.  Left radial sensory response was normal in latency with a reduced amplitude.  Left sural and superficial sensory responses were absent.   Left median motor response was normal in amplitude and latency with a borderline velocity.  Left ulnar motor response was normal in amplitude, latency and velocity.  Left peroneal motor response recording the extensor digitorum brevis muscle was absent.  Left peroneal motor response recording the tibialis anterior was reduced in amplitude with a normal latency and velocity.  Left tibial motor response was absent.    Needle EMG was performed in the left upper and lower extremities as well as the paraspinal muscles.  Active denervation was present in the left tibialis anterior and medial gastrocnemius muscles.  Motor units were absent in the left extensor hallucis longus muscle.  Nascent motor units were present in the left tibialis anterior muscle.  Motor units were small with decreased recruitment in the left medial gastrocnemius muscle.  Motor units were large, long and at times polyphasic with reduced recruitment in the left flexor digitorum longus, vastus medialis, tensor fascial jono, short head of the biceps femoris, first dorsal interosseous, abductor pollicis brevis, pronator teres, biceps, triceps, and deltoid muscles.  All other motor unit morphology and recruitment patterns were normal.    Impression: This is an abnormal EMG of the left upper and lower extremities.  There is electrophysiologic evidence of the followin. Chronic, moderately severe, axonal, length dependent sensorimotor, peripheral polyneuropathy with active denervation.  2. Chronic, mild to moderate, L5 radiculopathy without active denervation.  Additional radiculopathies at L4 and S1 cannot entirely be excluded.  3. Chronic, moderately severe, left C6 radiculopathy without active denervation.  There is no evidence of plexopathy, focal neuropathy or myopathy on this study.      Thank you for referring to the [facility] in [place].  Please feel free to contact the clinic if you have any further questions  regarding this study or report.         ____________________________  Roxana Arreguin D.O., MALLORIE, JEROME, CORNELIO

## 2020-11-09 NOTE — PROCEDURES
[facility]   [facility]  [address] [facility] [place] [address]  [phone]             Full Name: Michael Ann Gender: Male  Patient ID: [id number] YOB: 1945      Visit Date: 10/16/2020 08:31  Age: 75 Years 6 Months Old  Examining Physician: Roxana Arreguin D.O., ABPN, AOBNP, ABEM   Referring Physician: Roxana Arreguin D.O.   Technologist: Sunshine BANEGAS   Height: 5 feet 8 inch  History: Symptoms consist of numbness on the tip of the fingers. He has weakness and imbalance in the lower extremities. Patient states he has trouble lifting the foot and toes. Previous history of neck surgery anteriorly. Evaluate right upper and lower extremity for focal neuropathy versus radiculopathy versus peripheral neuropathy.      Sensory NCS      Nerve / Sites Rec. Site Onset Lat Peak Lat NP Amp Segments Distance Peak Diff Velocity     ms ms µV  cm ms m/s   R Median - Digit II (Antidromic)      Wrist Dig II 2.97 3.75 6.1 Wrist - Dig II 13  44      Ref.   ?3.80 ?10.0 Ref.   ?50   R Ulnar - Digit V (Antidromic)      Wrist Dig V 2.45 3.18 8.7 Wrist - Dig V 11  45      Ref.   ?3.20 ?10.0 Ref.   ?50   R Sural - Ankle (Calf)      Calf Ankle NR NR NR Calf - Ankle 14  NR      Ref.   ?4.60 ?3.0 Ref.   ?40   R Superficial peroneal - Ankle      Lat leg Ankle NR NR NR Lat leg - Ankle 12  NR      Ref.   ?4.60 ?3.0 Ref.      Median - Mixed Palmar      Med - Palm Wrist 2.14 2.86 5.3 Med - Palm - Wrist 14  66      Uln - Palm Wrist 1.98 2.60 9.3 Uln - Palm - Wrist 14  71        Med - Palm - Uln - Palm  0.26         Ref.  ?0.60        Motor NCS      Nerve / Sites Muscle Latency Ref. Amplitude Ref. Amp % Duration Segments Distance Lat Diff Velocity Ref.     ms ms mV mV % ms  cm ms m/s m/s   R Median - APB      Wrist APB 2.92 ?4.00 6.3 ?5.0 100 5.63 Wrist - APB          Elbow APB 8.18  6.0  95.3 6.09 Elbow - Wrist 26 5.26 49 ?50   R Ulnar - ADM      Wrist ADM 2.08 ?3.10 8.5  ?7.0 100 6.51 Wrist - ADM          B.Elbow ADM 5.94  7.1  83.8 6.98 B.Elbow - Wrist 23 3.85 60 ?50      A.Elbow ADM 7.76  6.7  78.7 7.71 A.Elbow - B.Elbow 11 1.82 60    R Peroneal - EDB      Ankle EDB NR ?6.00 NR ?2.5 NR NR Ankle - EDB       R Peroneal - Tib Ant      Fib Head Tib Ant 2.50 ?4.50 1.1 ?3.0 100 6.35 Fib Head - Tib Ant          Pop fossa Tib Ant 5.00  1.1  97.7 6.46 Pop fossa - Fib Head 8.5 2.50 34 ?40   R Tibial - AH      Ankle AH NR ?6.00 NR ?2.5 NR NR Ankle - AH           EMG Summary Table     Spontaneous Recruitment Activation Duration Amplitude Polyphasia Comment   Muscle Ins Act Fib Fasc Pattern - - - - -   R. First dorsal interosseous Normal 0 0 Sl Dec Normal N/+1 N/+1 +1 Normal   R. Abductor pollicis brevis Normal 0 0 Sl Dec Normal Normal N/+1 Normal Normal   R. Pronator teres Inc Occ 0 Sl Dec Normal +1 +1 +1 Normal   R. Extensor digitorum communis Normal 0 0 Sl Dec Normal N/+1 N/+1 Normal Normal   R. Biceps brachii Normal 0 0 Sl Dec Normal Normal N/+1 Normal Normal   R. Triceps brachii Normal 0 0 Sl Dec Normal +1 Normal Normal Normal   R. Deltoid Normal 0 0 Sl Dec Normal N/+1 Normal Normal Normal   R. Cervical paraspinals Normal 0 0 Normal Normal Normal Normal Normal Normal   R. Tibialis anterior Inc +2 0 Mk Dec Normal +1 +2 +1 doublets   R. Gastrocnemius (Medial head) Inc +2 0 Mod Dec Normal +1 N/-1 N/+1 Normal   R. Extensor hallucis longus Normal 0 0 Mk Dec Normal N/-1 N/-1 Normal Nascent Unit   R. Flexor digitorum longus Normal 0 0 Mk Dec Normal +1 N/-1 N/+1 Normal   R. Vastus medialis Normal 0 0 Mod Dec Normal +2 +1 +2 Normal   R. Biceps femoris (short head) Normal 0 0 Sl Dec Normal +2 +1 +1 Normal   R. Tensor fasciae latae Normal 0 0 Sl Dec Normal +1 N/+1 +1 Normal   R. Lumbar paraspinals Normal 0 0 Normal Normal Normal Normal Normal Normal         Summary:  Nerve conduction studies were performed on the right upper and lower extremities.  Right median sensory response was reduced in  amplitude with normal latency.  Right ulnar sensory response was reduced in amplitude with normal latency.  Right sural and superficial peroneal sensory responses were absent.  Right median motor response was normal in amplitude and latency with a borderline velocity.  Right ulnar motor response was normal in amplitude, latency and velocity.  Right peroneal motor response recording the extensor digitorum brevis muscle was absent.  Right peroneal motor response recording the tibialis anterior was markedly reduced amplitude with normal latency and slowing of the velocity.  Right tibial motor response was absent.  Right median versus ulnar mixed palmar comparison studies revealed no significant difference in latency between the median and ulnar nerve.  Needle EMG was performed of the right upper and lower extremities.  Active denervation was present in the right pronator teres, tibialis anterior, and medial gastrocnemius muscles.  Motor units were large, long and poly phasic with reduced recruitment in the right 1st dorsal interosseous, pronator teres, tibialis anterior, medial gastrocnemius, flexor digitorum longus, vastus medialis, short head of the biceps femoris, and tensor fascia jono muscles.  Motor units were enlarged with reduced recruitment in the right abductor pollicis brevis, extensor digitorum communis, biceps, triceps, and deltoid.  Nascent motor units were present in the extensor hallucis longus.    Impression:  This is an abnormal EMG of the right upper and lower extremity.  There is electrophysiologic evidence of the followin. Chronic, moderately severe, length-dependent, axonal, peripheral polyneuropathy with active denervation.  2. Chronic, mild, right, C6 radiculopathy with slight active denervation.  3. Chronic, mild to moderate, right lumbosacral polyradiculopathy affecting the L4, L5 and S1 nerve roots without active denervation.  There is no evidence of any other focal neuropathy,  plexopathy or myopathy on the study.      Thank you for referring to the [facility] in [place].  Please feel free to contact the clinic if you have any further questions regarding this study or report.         ____________________________  Roxana Arreguin D.O., ABPN, AOBNP, CORNELIO

## 2020-11-20 ENCOUNTER — TELEPHONE (OUTPATIENT)
Dept: NEUROLOGY | Facility: CLINIC | Age: 75
End: 2020-11-20

## 2020-11-20 NOTE — TELEPHONE ENCOUNTER
----- Message from Kandi Medina sent at 11/20/2020  1:37 PM CST -----  Type:  Patient Returning Call    Who Called: pt wife   Who Left Message for Patient: pt wife  Does the patient know what this is regarding?: pt wife need a call about pt EMG pt had done   Would the patient rather a call back or a response via [facility]?  Call   Best Call Back Number: [phone]  Additional Information:  call back         The patient is a 85y Female complaining of cough.

## 2020-11-23 ENCOUNTER — TELEPHONE (OUTPATIENT)
Dept: NEUROLOGY | Facility: CLINIC | Age: 75
End: 2020-11-23

## 2020-11-23 NOTE — TELEPHONE ENCOUNTER
----- Message from Amaya Tirado sent at 11/23/2020 10:26 AM CST -----  Regarding: advice  Contact: Wife/Izabel/[phone] (home)  Type: Needs Medical Advice  Who Called:  Wife/Izabel/[phone] (home)       Additional Information: Patient had an EMG test in 10/16 and 10/23/20. Office was supposed to confer with Dr. Vidal on the results. They never heard from the office again. Did office talk with Dr. Vidal? They need a copy of the results of these two tests, that they never received. They have no idea of what is going on. Does the patient need surgery? When and who needs to see him next. Please call to advise of the status and what is the next step. Patient can hardly walk.

## 2020-11-27 ENCOUNTER — TELEPHONE (OUTPATIENT)
Dept: NEUROLOGY | Facility: CLINIC | Age: 75
End: 2020-11-27

## 2020-11-27 NOTE — TELEPHONE ENCOUNTER
Spoke with pt wife and mailed copies of EMGs per request. Wife had a lot of questions. Recommended calling back to schedule follow up appt with Dr. Arreguin once schedule open up. Pt wife voiced understanding.

## 2020-12-11 ENCOUNTER — TELEPHONE (OUTPATIENT)
Dept: NEUROLOGY | Facility: CLINIC | Age: 75
End: 2020-12-11

## 2020-12-11 NOTE — TELEPHONE ENCOUNTER
----- Message from Rashawn Connelly sent at 12/11/2020 12:30 PM CST -----  Contact: wife/Izabel Paiz called in and stated she would like to schedule an appointment for patient for sometime in January to go over EMG results & what his next plan of treatment would be.    Call back number is [phone]

## 2021-01-05 ENCOUNTER — TELEPHONE (OUTPATIENT)
Dept: FAMILY MEDICINE | Facility: CLINIC | Age: 76
End: 2021-01-05

## 2021-01-05 DIAGNOSIS — Z91.89 AT INCREASED RISK OF EXPOSURE TO COVID-19 VIRUS: Primary | ICD-10-CM

## 2021-01-12 ENCOUNTER — PATIENT OUTREACH (OUTPATIENT)
Dept: ADMINISTRATIVE | Facility: OTHER | Age: 76
End: 2021-01-12

## 2021-01-12 ENCOUNTER — LAB VISIT (OUTPATIENT)
Dept: LAB | Facility: HOSPITAL | Age: 76
End: 2021-01-12
Attending: INTERNAL MEDICINE
Payer: MEDICARE

## 2021-01-12 DIAGNOSIS — R74.8 ELEVATED CK: ICD-10-CM

## 2021-01-12 DIAGNOSIS — I10 ESSENTIAL HYPERTENSION: ICD-10-CM

## 2021-01-12 DIAGNOSIS — Z91.89 AT INCREASED RISK OF EXPOSURE TO COVID-19 VIRUS: ICD-10-CM

## 2021-01-12 LAB
ALBUMIN SERPL BCP-MCNC: 3.5 G/DL (ref 3.5–5.2)
ALP SERPL-CCNC: 60 U/L (ref 55–135)
ALT SERPL W/O P-5'-P-CCNC: 29 U/L (ref 10–44)
ANION GAP SERPL CALC-SCNC: 9 MMOL/L (ref 8–16)
AST SERPL-CCNC: 26 U/L (ref 10–40)
BASOPHILS # BLD AUTO: 0.05 K/UL (ref 0–0.2)
BASOPHILS NFR BLD: 0.8 % (ref 0–1.9)
BILIRUB SERPL-MCNC: 1 MG/DL (ref 0.1–1)
BUN SERPL-MCNC: 20 MG/DL (ref 8–23)
CALCIUM SERPL-MCNC: 8.7 MG/DL (ref 8.7–10.5)
CHLORIDE SERPL-SCNC: 105 MMOL/L (ref 95–110)
CHOLEST SERPL-MCNC: 151 MG/DL (ref 120–199)
CHOLEST/HDLC SERPL: 3.9 {RATIO} (ref 2–5)
CK SERPL-CCNC: 334 U/L (ref 20–200)
CO2 SERPL-SCNC: 29 MMOL/L (ref 23–29)
CREAT SERPL-MCNC: 0.8 MG/DL (ref 0.5–1.4)
DIFFERENTIAL METHOD: ABNORMAL
EOSINOPHIL # BLD AUTO: 0.2 K/UL (ref 0–0.5)
EOSINOPHIL NFR BLD: 2.6 % (ref 0–8)
ERYTHROCYTE [DISTWIDTH] IN BLOOD BY AUTOMATED COUNT: 13.7 % (ref 11.5–14.5)
EST. GFR  (AFRICAN AMERICAN): >60 ML/MIN/1.73 M^2
EST. GFR  (NON AFRICAN AMERICAN): >60 ML/MIN/1.73 M^2
GLUCOSE SERPL-MCNC: 92 MG/DL (ref 70–110)
HCT VFR BLD AUTO: 47.5 % (ref 40–54)
HDLC SERPL-MCNC: 39 MG/DL (ref 40–75)
HDLC SERPL: 25.8 % (ref 20–50)
HGB BLD-MCNC: 15.6 G/DL (ref 14–18)
IMM GRANULOCYTES # BLD AUTO: 0.01 K/UL (ref 0–0.04)
IMM GRANULOCYTES NFR BLD AUTO: 0.2 % (ref 0–0.5)
LDLC SERPL CALC-MCNC: 91.8 MG/DL (ref 63–159)
LYMPHOCYTES # BLD AUTO: 1.8 K/UL (ref 1–4.8)
LYMPHOCYTES NFR BLD: 29.4 % (ref 18–48)
MCH RBC QN AUTO: 32.3 PG (ref 27–31)
MCHC RBC AUTO-ENTMCNC: 32.8 G/DL (ref 32–36)
MCV RBC AUTO: 98 FL (ref 82–98)
MONOCYTES # BLD AUTO: 0.7 K/UL (ref 0.3–1)
MONOCYTES NFR BLD: 11.2 % (ref 4–15)
NEUTROPHILS # BLD AUTO: 3.5 K/UL (ref 1.8–7.7)
NEUTROPHILS NFR BLD: 55.8 % (ref 38–73)
NONHDLC SERPL-MCNC: 112 MG/DL
NRBC BLD-RTO: 0 /100 WBC
PLATELET # BLD AUTO: 205 K/UL (ref 150–350)
PMV BLD AUTO: 10.8 FL (ref 9.2–12.9)
POTASSIUM SERPL-SCNC: 3.7 MMOL/L (ref 3.5–5.1)
PROT SERPL-MCNC: 6.5 G/DL (ref 6–8.4)
RBC # BLD AUTO: 4.83 M/UL (ref 4.6–6.2)
SARS-COV-2 IGG SERPLBLD QL IA.RAPID: NEGATIVE
SODIUM SERPL-SCNC: 143 MMOL/L (ref 136–145)
TRIGL SERPL-MCNC: 101 MG/DL (ref 30–150)
WBC # BLD AUTO: 6.26 K/UL (ref 3.9–12.7)

## 2021-01-12 PROCEDURE — 86769 SARS-COV-2 COVID-19 ANTIBODY: CPT

## 2021-01-12 PROCEDURE — 80061 LIPID PANEL: CPT

## 2021-01-12 PROCEDURE — 82550 ASSAY OF CK (CPK): CPT

## 2021-01-12 PROCEDURE — 80053 COMPREHEN METABOLIC PANEL: CPT

## 2021-01-12 PROCEDURE — 85025 COMPLETE CBC W/AUTO DIFF WBC: CPT

## 2021-01-13 ENCOUNTER — TELEPHONE (OUTPATIENT)
Dept: FAMILY MEDICINE | Facility: CLINIC | Age: 76
End: 2021-01-13

## 2021-01-14 ENCOUNTER — OFFICE VISIT (OUTPATIENT)
Dept: NEUROLOGY | Facility: CLINIC | Age: 76
End: 2021-01-14
Payer: MEDICARE

## 2021-01-14 VITALS
SYSTOLIC BLOOD PRESSURE: 141 MMHG | DIASTOLIC BLOOD PRESSURE: 81 MMHG | HEART RATE: 70 BPM | HEIGHT: 67 IN | WEIGHT: 187.06 LBS | TEMPERATURE: 98 F | BODY MASS INDEX: 29.36 KG/M2

## 2021-01-14 DIAGNOSIS — M48.061 SPINAL STENOSIS OF LUMBAR REGION WITHOUT NEUROGENIC CLAUDICATION: ICD-10-CM

## 2021-01-14 DIAGNOSIS — M21.372 LEFT FOOT DROP: ICD-10-CM

## 2021-01-14 DIAGNOSIS — M54.16 LUMBAR RADICULOPATHY: ICD-10-CM

## 2021-01-14 DIAGNOSIS — G60.9 IDIOPATHIC PERIPHERAL NEUROPATHY: Primary | ICD-10-CM

## 2021-01-14 DIAGNOSIS — R74.8 ELEVATED CK: ICD-10-CM

## 2021-01-14 PROCEDURE — 99417 PR PROLONGED SVC, OUTPT, W/WO DIRECT PT CONTACT,  EA ADDTL 15 MIN: ICD-10-PCS | Mod: S$PBB,,, | Performed by: PSYCHIATRY & NEUROLOGY

## 2021-01-14 PROCEDURE — 99999 PR PBB SHADOW E&M-EST. PATIENT-LVL IV: ICD-10-PCS | Mod: PBBFAC,,, | Performed by: PSYCHIATRY & NEUROLOGY

## 2021-01-14 PROCEDURE — 99417 PROLNG OP E/M EACH 15 MIN: CPT | Mod: S$PBB,,, | Performed by: PSYCHIATRY & NEUROLOGY

## 2021-01-14 PROCEDURE — 99999 PR PBB SHADOW E&M-EST. PATIENT-LVL IV: CPT | Mod: PBBFAC,,, | Performed by: PSYCHIATRY & NEUROLOGY

## 2021-01-14 PROCEDURE — 99215 OFFICE O/P EST HI 40 MIN: CPT | Mod: S$PBB,,, | Performed by: PSYCHIATRY & NEUROLOGY

## 2021-01-14 PROCEDURE — 99214 OFFICE O/P EST MOD 30 MIN: CPT | Mod: PBBFAC,PN | Performed by: PSYCHIATRY & NEUROLOGY

## 2021-01-14 PROCEDURE — 99215 PR OFFICE/OUTPT VISIT, EST, LEVL V, 40-54 MIN: ICD-10-PCS | Mod: S$PBB,,, | Performed by: PSYCHIATRY & NEUROLOGY

## 2021-01-15 ENCOUNTER — LAB VISIT (OUTPATIENT)
Dept: LAB | Facility: HOSPITAL | Age: 76
End: 2021-01-15
Attending: PSYCHIATRY & NEUROLOGY
Payer: MEDICARE

## 2021-01-15 DIAGNOSIS — M54.16 LUMBAR RADICULOPATHY: ICD-10-CM

## 2021-01-15 DIAGNOSIS — M21.372 LEFT FOOT DROP: ICD-10-CM

## 2021-01-15 DIAGNOSIS — R74.8 ELEVATED CK: ICD-10-CM

## 2021-01-15 DIAGNOSIS — M48.061 SPINAL STENOSIS OF LUMBAR REGION WITHOUT NEUROGENIC CLAUDICATION: ICD-10-CM

## 2021-01-15 DIAGNOSIS — G60.9 IDIOPATHIC PERIPHERAL NEUROPATHY: ICD-10-CM

## 2021-01-15 LAB
ALBUMIN SERPL BCP-MCNC: 3.9 G/DL (ref 3.5–5.2)
ALP SERPL-CCNC: 69 U/L (ref 55–135)
ALT SERPL W/O P-5'-P-CCNC: 31 U/L (ref 10–44)
ANION GAP SERPL CALC-SCNC: 10 MMOL/L (ref 8–16)
AST SERPL-CCNC: 30 U/L (ref 10–40)
BASOPHILS # BLD AUTO: 0.05 K/UL (ref 0–0.2)
BASOPHILS NFR BLD: 0.8 % (ref 0–1.9)
BILIRUB SERPL-MCNC: 1 MG/DL (ref 0.1–1)
BUN SERPL-MCNC: 22 MG/DL (ref 8–23)
CALCIUM SERPL-MCNC: 9.6 MG/DL (ref 8.7–10.5)
CHLORIDE SERPL-SCNC: 104 MMOL/L (ref 95–110)
CK SERPL-CCNC: 341 U/L (ref 20–200)
CO2 SERPL-SCNC: 29 MMOL/L (ref 23–29)
CREAT SERPL-MCNC: 0.8 MG/DL (ref 0.5–1.4)
DIFFERENTIAL METHOD: ABNORMAL
EOSINOPHIL # BLD AUTO: 0.2 K/UL (ref 0–0.5)
EOSINOPHIL NFR BLD: 2.3 % (ref 0–8)
ERYTHROCYTE [DISTWIDTH] IN BLOOD BY AUTOMATED COUNT: 13.9 % (ref 11.5–14.5)
EST. GFR  (AFRICAN AMERICAN): >60 ML/MIN/1.73 M^2
EST. GFR  (NON AFRICAN AMERICAN): >60 ML/MIN/1.73 M^2
FOLATE SERPL-MCNC: 10.9 NG/ML (ref 4–24)
GLUCOSE SERPL-MCNC: 97 MG/DL (ref 70–110)
HCT VFR BLD AUTO: 50.8 % (ref 40–54)
HGB BLD-MCNC: 16.6 G/DL (ref 14–18)
IMM GRANULOCYTES # BLD AUTO: 0.01 K/UL (ref 0–0.04)
IMM GRANULOCYTES NFR BLD AUTO: 0.2 % (ref 0–0.5)
LYMPHOCYTES # BLD AUTO: 1.7 K/UL (ref 1–4.8)
LYMPHOCYTES NFR BLD: 25.5 % (ref 18–48)
MCH RBC QN AUTO: 32 PG (ref 27–31)
MCHC RBC AUTO-ENTMCNC: 32.7 G/DL (ref 32–36)
MCV RBC AUTO: 98 FL (ref 82–98)
MONOCYTES # BLD AUTO: 0.6 K/UL (ref 0.3–1)
MONOCYTES NFR BLD: 9.1 % (ref 4–15)
NEUTROPHILS # BLD AUTO: 4 K/UL (ref 1.8–7.7)
NEUTROPHILS NFR BLD: 62.1 % (ref 38–73)
NRBC BLD-RTO: 0 /100 WBC
PLATELET # BLD AUTO: 216 K/UL (ref 150–350)
PMV BLD AUTO: 10.9 FL (ref 9.2–12.9)
POTASSIUM SERPL-SCNC: 4.1 MMOL/L (ref 3.5–5.1)
PROT SERPL-MCNC: 6.9 G/DL (ref 6–8.4)
RBC # BLD AUTO: 5.19 M/UL (ref 4.6–6.2)
SODIUM SERPL-SCNC: 143 MMOL/L (ref 136–145)
VIT B12 SERPL-MCNC: 492 PG/ML (ref 210–950)
WBC # BLD AUTO: 6.5 K/UL (ref 3.9–12.7)

## 2021-01-15 PROCEDURE — 86334 PATHOLOGIST INTERPRETATION IFE: ICD-10-PCS | Mod: 26,,, | Performed by: PATHOLOGY

## 2021-01-15 PROCEDURE — 84165 PROTEIN E-PHORESIS SERUM: CPT | Mod: 26,,, | Performed by: PATHOLOGY

## 2021-01-15 PROCEDURE — 86334 IMMUNOFIX E-PHORESIS SERUM: CPT

## 2021-01-15 PROCEDURE — 84165 PROTEIN E-PHORESIS SERUM: CPT

## 2021-01-15 PROCEDURE — 84165 PATHOLOGIST INTERPRETATION SPE: ICD-10-PCS | Mod: 26,,, | Performed by: PATHOLOGY

## 2021-01-15 PROCEDURE — 80053 COMPREHEN METABOLIC PANEL: CPT

## 2021-01-15 PROCEDURE — 82746 ASSAY OF FOLIC ACID SERUM: CPT

## 2021-01-15 PROCEDURE — 85025 COMPLETE CBC W/AUTO DIFF WBC: CPT

## 2021-01-15 PROCEDURE — 84425 ASSAY OF VITAMIN B-1: CPT

## 2021-01-15 PROCEDURE — 86235 NUCLEAR ANTIGEN ANTIBODY: CPT | Mod: 59

## 2021-01-15 PROCEDURE — 84207 ASSAY OF VITAMIN B-6: CPT

## 2021-01-15 PROCEDURE — 82607 VITAMIN B-12: CPT

## 2021-01-15 PROCEDURE — 82550 ASSAY OF CK (CPK): CPT

## 2021-01-15 PROCEDURE — 86334 IMMUNOFIX E-PHORESIS SERUM: CPT | Mod: 26,,, | Performed by: PATHOLOGY

## 2021-01-15 PROCEDURE — 83516 IMMUNOASSAY NONANTIBODY: CPT | Mod: 59

## 2021-01-19 ENCOUNTER — TELEPHONE (OUTPATIENT)
Dept: NEUROLOGY | Facility: CLINIC | Age: 76
End: 2021-01-19

## 2021-01-19 LAB
ALBUMIN SERPL ELPH-MCNC: 3.84 G/DL (ref 3.35–5.55)
ALPHA1 GLOB SERPL ELPH-MCNC: 0.24 G/DL (ref 0.17–0.41)
ALPHA2 GLOB SERPL ELPH-MCNC: 0.72 G/DL (ref 0.43–0.99)
B-GLOBULIN SERPL ELPH-MCNC: 0.81 G/DL (ref 0.5–1.1)
GAMMA GLOB SERPL ELPH-MCNC: 0.98 G/DL (ref 0.67–1.58)
INTERPRETATION SERPL IFE-IMP: NORMAL
PATHOLOGIST INTERPRETATION IFE: NORMAL
PATHOLOGIST INTERPRETATION SPE: NORMAL
PROT SERPL-MCNC: 6.6 G/DL (ref 6–8.4)
PYRIDOXAL SERPL-MCNC: 19 UG/L (ref 5–50)
VIT B1 BLD-MCNC: 62 UG/L (ref 38–122)

## 2021-01-20 ENCOUNTER — OFFICE VISIT (OUTPATIENT)
Dept: FAMILY MEDICINE | Facility: CLINIC | Age: 76
End: 2021-01-20
Payer: MEDICARE

## 2021-01-20 VITALS
TEMPERATURE: 98 F | HEIGHT: 67 IN | DIASTOLIC BLOOD PRESSURE: 80 MMHG | WEIGHT: 183 LBS | BODY MASS INDEX: 28.72 KG/M2 | RESPIRATION RATE: 16 BRPM | SYSTOLIC BLOOD PRESSURE: 118 MMHG | HEART RATE: 82 BPM | OXYGEN SATURATION: 96 %

## 2021-01-20 DIAGNOSIS — I10 ESSENTIAL HYPERTENSION: ICD-10-CM

## 2021-01-20 DIAGNOSIS — E78.5 HYPERLIPIDEMIA, UNSPECIFIED HYPERLIPIDEMIA TYPE: ICD-10-CM

## 2021-01-20 DIAGNOSIS — M21.372 LEFT FOOT DROP: Primary | ICD-10-CM

## 2021-01-20 PROCEDURE — 99214 PR OFFICE/OUTPT VISIT, EST, LEVL IV, 30-39 MIN: ICD-10-PCS | Mod: S$GLB,,, | Performed by: INTERNAL MEDICINE

## 2021-01-20 PROCEDURE — 99214 OFFICE O/P EST MOD 30 MIN: CPT | Mod: S$GLB,,, | Performed by: INTERNAL MEDICINE

## 2021-01-21 PROCEDURE — 81374 HLA I TYPING 1 ANTIGEN LR: CPT | Mod: PO

## 2021-01-22 LAB
AMPHIPHYSIN AB TITR SER: NEGATIVE TITER
CV2 IGG TITR SER: NEGATIVE TITER
GLIAL NUC TYPE 1 AB TITR SER: NEGATIVE TITER
HU1 AB TITR SER: NEGATIVE TITER
HU2 AB TITR SER IF: NEGATIVE TITER
HU3 AB TITR SER: NEGATIVE TITER
IMMUNOLOGIST REVIEW: NORMAL
NACHR AB SER-SCNC: 0 NMOL/L
PAVAL REFLEX TEST ADDED: NORMAL
PCA-1 AB TITR SER: NEGATIVE TITER
PCA-2 AB TITR SER: NEGATIVE TITER
PCA-TR AB TITR SER: NEGATIVE TITER
STRIA MUS AB TITR SER: NEGATIVE TITER
VGCC-N BIND AB SER-SCNC: 0 NMOL/L
VGCC-P/Q BIND AB SER-SCNC: 0 NMOL/L
VGKC AB SER-SCNC: 0 NMOL/L

## 2021-01-25 LAB
HLA B27 INTERPRETATION: NORMAL
HLA-B27 RELATED AG QL: NEGATIVE
HLA-B27 RELATED AG QL: NORMAL

## 2021-02-02 LAB
EJ AB SER QL: NOT DETECTED
ENA JO1 AB SER IA-ACNC: NORMAL AI
KU AB SER QL: NOT DETECTED
MI2 AB SER QL: NOT DETECTED
OJ AB SER QL: NOT DETECTED
PL12 AB SER QL: NOT DETECTED
PL7 AB SER QL: NOT DETECTED
SRP AB SERPL QL: NOT DETECTED

## 2021-02-08 ENCOUNTER — IMMUNIZATION (OUTPATIENT)
Dept: FAMILY MEDICINE | Facility: CLINIC | Age: 76
End: 2021-02-08
Payer: MEDICARE

## 2021-02-08 DIAGNOSIS — Z23 NEED FOR VACCINATION: Primary | ICD-10-CM

## 2021-02-08 PROCEDURE — 0001A COVID-19, MRNA, LNP-S, PF, 30 MCG/0.3 ML DOSE VACCINE: CPT | Mod: CV19,,, | Performed by: FAMILY MEDICINE

## 2021-02-08 PROCEDURE — 91300 COVID-19, MRNA, LNP-S, PF, 30 MCG/0.3 ML DOSE VACCINE: ICD-10-PCS | Mod: ,,, | Performed by: FAMILY MEDICINE

## 2021-02-08 PROCEDURE — 0001A COVID-19, MRNA, LNP-S, PF, 30 MCG/0.3 ML DOSE VACCINE: ICD-10-PCS | Mod: CV19,,, | Performed by: FAMILY MEDICINE

## 2021-02-08 PROCEDURE — 91300 COVID-19, MRNA, LNP-S, PF, 30 MCG/0.3 ML DOSE VACCINE: CPT | Mod: ,,, | Performed by: FAMILY MEDICINE

## 2021-02-19 ENCOUNTER — PATIENT OUTREACH (OUTPATIENT)
Dept: ADMINISTRATIVE | Facility: OTHER | Age: 76
End: 2021-02-19

## 2021-02-23 ENCOUNTER — OFFICE VISIT (OUTPATIENT)
Dept: NEUROLOGY | Facility: CLINIC | Age: 76
End: 2021-02-23
Payer: MEDICARE

## 2021-02-23 VITALS
BODY MASS INDEX: 29.39 KG/M2 | WEIGHT: 187.25 LBS | HEART RATE: 79 BPM | SYSTOLIC BLOOD PRESSURE: 120 MMHG | DIASTOLIC BLOOD PRESSURE: 81 MMHG | HEIGHT: 67 IN

## 2021-02-23 DIAGNOSIS — M54.16 LUMBAR RADICULOPATHY: ICD-10-CM

## 2021-02-23 DIAGNOSIS — R74.8 HYPERCKEMIA: ICD-10-CM

## 2021-02-23 DIAGNOSIS — G60.9 IDIOPATHIC PERIPHERAL NEUROPATHY: Primary | ICD-10-CM

## 2021-02-23 DIAGNOSIS — M21.372 BILATERAL FOOT-DROP: ICD-10-CM

## 2021-02-23 DIAGNOSIS — M21.371 BILATERAL FOOT-DROP: ICD-10-CM

## 2021-02-23 PROCEDURE — 99999 PR PBB SHADOW E&M-EST. PATIENT-LVL IV: ICD-10-PCS | Mod: PBBFAC,,, | Performed by: PSYCHIATRY & NEUROLOGY

## 2021-02-23 PROCEDURE — 99214 OFFICE O/P EST MOD 30 MIN: CPT | Mod: PBBFAC,PO | Performed by: PSYCHIATRY & NEUROLOGY

## 2021-02-23 PROCEDURE — 99999 PR PBB SHADOW E&M-EST. PATIENT-LVL IV: CPT | Mod: PBBFAC,,, | Performed by: PSYCHIATRY & NEUROLOGY

## 2021-02-23 PROCEDURE — 99215 PR OFFICE/OUTPT VISIT, EST, LEVL V, 40-54 MIN: ICD-10-PCS | Mod: S$PBB,,, | Performed by: PSYCHIATRY & NEUROLOGY

## 2021-02-23 PROCEDURE — 99215 OFFICE O/P EST HI 40 MIN: CPT | Mod: S$PBB,,, | Performed by: PSYCHIATRY & NEUROLOGY

## 2021-03-01 ENCOUNTER — IMMUNIZATION (OUTPATIENT)
Dept: FAMILY MEDICINE | Facility: CLINIC | Age: 76
End: 2021-03-01
Payer: MEDICARE

## 2021-03-01 DIAGNOSIS — Z23 NEED FOR VACCINATION: Primary | ICD-10-CM

## 2021-03-01 PROCEDURE — 91300 COVID-19, MRNA, LNP-S, PF, 30 MCG/0.3 ML DOSE VACCINE: CPT | Mod: PBBFAC,PO

## 2021-03-01 PROCEDURE — 0002A COVID-19, MRNA, LNP-S, PF, 30 MCG/0.3 ML DOSE VACCINE: CPT | Mod: PBBFAC,PO

## 2021-04-19 PROBLEM — M54.16 LUMBAR RADICULOPATHY: Status: ACTIVE | Noted: 2021-04-19

## 2021-04-19 PROBLEM — M21.371 BILATERAL FOOT-DROP: Status: ACTIVE | Noted: 2021-04-19

## 2021-04-19 PROBLEM — M21.372 BILATERAL FOOT-DROP: Status: ACTIVE | Noted: 2021-04-19

## 2021-04-19 PROBLEM — R74.8 HYPERCKEMIA: Status: ACTIVE | Noted: 2021-04-19

## 2021-04-19 PROBLEM — G60.9 IDIOPATHIC PERIPHERAL NEUROPATHY: Status: ACTIVE | Noted: 2021-04-19

## 2021-07-13 ENCOUNTER — LAB VISIT (OUTPATIENT)
Dept: LAB | Facility: HOSPITAL | Age: 76
End: 2021-07-13
Attending: INTERNAL MEDICINE
Payer: MEDICARE

## 2021-07-13 DIAGNOSIS — E78.5 HYPERLIPIDEMIA, UNSPECIFIED HYPERLIPIDEMIA TYPE: ICD-10-CM

## 2021-07-13 DIAGNOSIS — I10 ESSENTIAL HYPERTENSION: ICD-10-CM

## 2021-07-13 LAB
ALBUMIN SERPL BCP-MCNC: 3.7 G/DL (ref 3.5–5.2)
ALP SERPL-CCNC: 51 U/L (ref 55–135)
ALT SERPL W/O P-5'-P-CCNC: 27 U/L (ref 10–44)
ANION GAP SERPL CALC-SCNC: 6 MMOL/L (ref 8–16)
AST SERPL-CCNC: 25 U/L (ref 10–40)
BILIRUB SERPL-MCNC: 0.8 MG/DL (ref 0.1–1)
BUN SERPL-MCNC: 20 MG/DL (ref 8–23)
CALCIUM SERPL-MCNC: 9.6 MG/DL (ref 8.7–10.5)
CHLORIDE SERPL-SCNC: 104 MMOL/L (ref 95–110)
CHOLEST SERPL-MCNC: 215 MG/DL (ref 120–199)
CHOLEST/HDLC SERPL: 5.2 {RATIO} (ref 2–5)
CO2 SERPL-SCNC: 30 MMOL/L (ref 23–29)
CREAT SERPL-MCNC: 0.9 MG/DL (ref 0.5–1.4)
EST. GFR  (AFRICAN AMERICAN): >60 ML/MIN/1.73 M^2
EST. GFR  (NON AFRICAN AMERICAN): >60 ML/MIN/1.73 M^2
GLUCOSE SERPL-MCNC: 90 MG/DL (ref 70–110)
HDLC SERPL-MCNC: 41 MG/DL (ref 40–75)
HDLC SERPL: 19.1 % (ref 20–50)
LDLC SERPL CALC-MCNC: 154.6 MG/DL (ref 63–159)
NONHDLC SERPL-MCNC: 174 MG/DL
POTASSIUM SERPL-SCNC: 3.6 MMOL/L (ref 3.5–5.1)
PROT SERPL-MCNC: 6.8 G/DL (ref 6–8.4)
SODIUM SERPL-SCNC: 140 MMOL/L (ref 136–145)
TRIGL SERPL-MCNC: 97 MG/DL (ref 30–150)

## 2021-07-13 PROCEDURE — 80061 LIPID PANEL: CPT | Performed by: INTERNAL MEDICINE

## 2021-07-13 PROCEDURE — 36415 COLL VENOUS BLD VENIPUNCTURE: CPT | Mod: PO | Performed by: INTERNAL MEDICINE

## 2021-07-13 PROCEDURE — 80053 COMPREHEN METABOLIC PANEL: CPT | Performed by: INTERNAL MEDICINE

## 2021-07-20 ENCOUNTER — OFFICE VISIT (OUTPATIENT)
Dept: FAMILY MEDICINE | Facility: CLINIC | Age: 76
End: 2021-07-20
Payer: MEDICARE

## 2021-07-20 VITALS
DIASTOLIC BLOOD PRESSURE: 84 MMHG | HEART RATE: 72 BPM | OXYGEN SATURATION: 98 % | WEIGHT: 184.31 LBS | RESPIRATION RATE: 18 BRPM | SYSTOLIC BLOOD PRESSURE: 131 MMHG | TEMPERATURE: 98 F | HEIGHT: 67 IN | BODY MASS INDEX: 28.93 KG/M2

## 2021-07-20 DIAGNOSIS — E78.5 HYPERLIPIDEMIA, UNSPECIFIED HYPERLIPIDEMIA TYPE: Primary | ICD-10-CM

## 2021-07-20 DIAGNOSIS — M21.372 BILATERAL FOOT-DROP: ICD-10-CM

## 2021-07-20 DIAGNOSIS — M21.371 BILATERAL FOOT-DROP: ICD-10-CM

## 2021-07-20 DIAGNOSIS — I10 ESSENTIAL HYPERTENSION: ICD-10-CM

## 2021-07-20 PROCEDURE — 99214 OFFICE O/P EST MOD 30 MIN: CPT | Mod: S$GLB,,, | Performed by: INTERNAL MEDICINE

## 2021-07-20 PROCEDURE — 99214 PR OFFICE/OUTPT VISIT, EST, LEVL IV, 30-39 MIN: ICD-10-PCS | Mod: S$GLB,,, | Performed by: INTERNAL MEDICINE

## 2021-07-20 RX ORDER — ATORVASTATIN CALCIUM 40 MG/1
40 TABLET, FILM COATED ORAL DAILY
Qty: 90 TABLET | Refills: 1 | Status: SHIPPED | OUTPATIENT
Start: 2021-07-20 | End: 2021-11-29

## 2021-07-21 ENCOUNTER — PATIENT OUTREACH (OUTPATIENT)
Dept: ADMINISTRATIVE | Facility: HOSPITAL | Age: 76
End: 2021-07-21

## 2021-09-27 DIAGNOSIS — I10 ESSENTIAL HYPERTENSION: ICD-10-CM

## 2021-09-27 DIAGNOSIS — R60.0 BILATERAL LEG EDEMA: ICD-10-CM

## 2021-09-27 DIAGNOSIS — R10.13 EPIGASTRIC PAIN: ICD-10-CM

## 2021-09-27 RX ORDER — PANTOPRAZOLE SODIUM 40 MG/1
40 TABLET, DELAYED RELEASE ORAL DAILY
Qty: 90 TABLET | Refills: 3 | Status: SHIPPED | OUTPATIENT
Start: 2021-09-27 | End: 2023-02-10 | Stop reason: SDUPTHER

## 2021-09-27 RX ORDER — TRIAMTERENE/HYDROCHLOROTHIAZID 37.5-25 MG
1 TABLET ORAL DAILY
Qty: 90 TABLET | Refills: 3 | Status: SHIPPED | OUTPATIENT
Start: 2021-09-27 | End: 2022-07-08 | Stop reason: SDUPTHER

## 2021-10-19 ENCOUNTER — LAB VISIT (OUTPATIENT)
Dept: LAB | Facility: HOSPITAL | Age: 76
End: 2021-10-19
Attending: INTERNAL MEDICINE
Payer: MEDICARE

## 2021-10-19 DIAGNOSIS — I10 ESSENTIAL HYPERTENSION: ICD-10-CM

## 2021-10-19 DIAGNOSIS — E78.5 HYPERLIPIDEMIA, UNSPECIFIED HYPERLIPIDEMIA TYPE: ICD-10-CM

## 2021-10-19 LAB
ALBUMIN SERPL BCP-MCNC: 3.7 G/DL (ref 3.5–5.2)
ALP SERPL-CCNC: 61 U/L (ref 55–135)
ALT SERPL W/O P-5'-P-CCNC: 37 U/L (ref 10–44)
ANION GAP SERPL CALC-SCNC: 9 MMOL/L (ref 8–16)
AST SERPL-CCNC: 35 U/L (ref 10–40)
BILIRUB SERPL-MCNC: 1.2 MG/DL (ref 0.1–1)
BUN SERPL-MCNC: 19 MG/DL (ref 8–23)
CALCIUM SERPL-MCNC: 9.3 MG/DL (ref 8.7–10.5)
CHLORIDE SERPL-SCNC: 101 MMOL/L (ref 95–110)
CHOLEST SERPL-MCNC: 148 MG/DL (ref 120–199)
CHOLEST/HDLC SERPL: 3.7 {RATIO} (ref 2–5)
CO2 SERPL-SCNC: 30 MMOL/L (ref 23–29)
CREAT SERPL-MCNC: 0.9 MG/DL (ref 0.5–1.4)
EST. GFR  (AFRICAN AMERICAN): >60 ML/MIN/1.73 M^2
EST. GFR  (NON AFRICAN AMERICAN): >60 ML/MIN/1.73 M^2
GLUCOSE SERPL-MCNC: 96 MG/DL (ref 70–110)
HDLC SERPL-MCNC: 40 MG/DL (ref 40–75)
HDLC SERPL: 27 % (ref 20–50)
LDLC SERPL CALC-MCNC: 89.8 MG/DL (ref 63–159)
NONHDLC SERPL-MCNC: 108 MG/DL
POTASSIUM SERPL-SCNC: 3.8 MMOL/L (ref 3.5–5.1)
PROT SERPL-MCNC: 6.5 G/DL (ref 6–8.4)
SODIUM SERPL-SCNC: 140 MMOL/L (ref 136–145)
TRIGL SERPL-MCNC: 91 MG/DL (ref 30–150)

## 2021-10-19 PROCEDURE — 36415 COLL VENOUS BLD VENIPUNCTURE: CPT | Mod: PO | Performed by: INTERNAL MEDICINE

## 2021-10-19 PROCEDURE — 80061 LIPID PANEL: CPT | Performed by: INTERNAL MEDICINE

## 2021-10-19 PROCEDURE — 80053 COMPREHEN METABOLIC PANEL: CPT | Performed by: INTERNAL MEDICINE

## 2021-10-26 ENCOUNTER — OFFICE VISIT (OUTPATIENT)
Dept: FAMILY MEDICINE | Facility: CLINIC | Age: 76
End: 2021-10-26
Payer: MEDICARE

## 2021-10-26 VITALS
BODY MASS INDEX: 28.89 KG/M2 | SYSTOLIC BLOOD PRESSURE: 130 MMHG | HEIGHT: 67 IN | OXYGEN SATURATION: 95 % | WEIGHT: 184.06 LBS | RESPIRATION RATE: 18 BRPM | DIASTOLIC BLOOD PRESSURE: 80 MMHG | HEART RATE: 65 BPM | TEMPERATURE: 98 F

## 2021-10-26 DIAGNOSIS — Z12.11 COLON CANCER SCREENING: ICD-10-CM

## 2021-10-26 DIAGNOSIS — E78.5 HYPERLIPIDEMIA, UNSPECIFIED HYPERLIPIDEMIA TYPE: ICD-10-CM

## 2021-10-26 DIAGNOSIS — I10 ESSENTIAL HYPERTENSION: Primary | ICD-10-CM

## 2021-10-26 PROCEDURE — 99214 OFFICE O/P EST MOD 30 MIN: CPT | Mod: S$GLB,,, | Performed by: INTERNAL MEDICINE

## 2021-10-26 PROCEDURE — 99214 PR OFFICE/OUTPT VISIT, EST, LEVL IV, 30-39 MIN: ICD-10-PCS | Mod: S$GLB,,, | Performed by: INTERNAL MEDICINE

## 2021-10-29 ENCOUNTER — IMMUNIZATION (OUTPATIENT)
Dept: PRIMARY CARE CLINIC | Facility: CLINIC | Age: 76
End: 2021-10-29
Payer: MEDICARE

## 2021-10-29 DIAGNOSIS — Z23 NEED FOR VACCINATION: Primary | ICD-10-CM

## 2021-10-29 PROCEDURE — 0003A COVID-19, MRNA, LNP-S, PF, 30 MCG/0.3 ML DOSE VACCINE: CPT | Mod: S$GLB,,, | Performed by: FAMILY MEDICINE

## 2021-10-29 PROCEDURE — 0003A COVID-19, MRNA, LNP-S, PF, 30 MCG/0.3 ML DOSE VACCINE: ICD-10-PCS | Mod: S$GLB,,, | Performed by: FAMILY MEDICINE

## 2021-10-29 PROCEDURE — 91300 COVID-19, MRNA, LNP-S, PF, 30 MCG/0.3 ML DOSE VACCINE: CPT | Mod: S$GLB,,, | Performed by: FAMILY MEDICINE

## 2021-10-29 PROCEDURE — 91300 COVID-19, MRNA, LNP-S, PF, 30 MCG/0.3 ML DOSE VACCINE: ICD-10-PCS | Mod: S$GLB,,, | Performed by: FAMILY MEDICINE

## 2021-11-18 ENCOUNTER — OFFICE VISIT (OUTPATIENT)
Dept: FAMILY MEDICINE | Facility: CLINIC | Age: 76
End: 2021-11-18
Payer: MEDICARE

## 2021-11-18 ENCOUNTER — TELEPHONE (OUTPATIENT)
Dept: FAMILY MEDICINE | Facility: CLINIC | Age: 76
End: 2021-11-18
Payer: MEDICARE

## 2021-11-18 VITALS
WEIGHT: 186.63 LBS | RESPIRATION RATE: 18 BRPM | HEIGHT: 67 IN | TEMPERATURE: 98 F | OXYGEN SATURATION: 97 % | HEART RATE: 70 BPM | BODY MASS INDEX: 29.29 KG/M2 | SYSTOLIC BLOOD PRESSURE: 122 MMHG | DIASTOLIC BLOOD PRESSURE: 84 MMHG

## 2021-11-18 DIAGNOSIS — B02.7 DISSEMINATED HERPES ZOSTER: Primary | ICD-10-CM

## 2021-11-18 DIAGNOSIS — E66.3 OVERWEIGHT (BMI 25.0-29.9): ICD-10-CM

## 2021-11-18 PROCEDURE — 99213 PR OFFICE/OUTPT VISIT, EST, LEVL III, 20-29 MIN: ICD-10-PCS | Mod: S$GLB,,, | Performed by: FAMILY MEDICINE

## 2021-11-18 PROCEDURE — 99213 OFFICE O/P EST LOW 20 MIN: CPT | Mod: S$GLB,,, | Performed by: FAMILY MEDICINE

## 2021-11-18 RX ORDER — VALACYCLOVIR HYDROCHLORIDE 1 G/1
1000 TABLET, FILM COATED ORAL 3 TIMES DAILY
Qty: 42 TABLET | Refills: 0 | Status: SHIPPED | OUTPATIENT
Start: 2021-11-18 | End: 2021-12-03 | Stop reason: SDUPTHER

## 2021-12-01 DIAGNOSIS — B02.7 DISSEMINATED HERPES ZOSTER: ICD-10-CM

## 2021-12-01 RX ORDER — VALACYCLOVIR HYDROCHLORIDE 1 G/1
1000 TABLET, FILM COATED ORAL 3 TIMES DAILY
Qty: 42 TABLET | Refills: 0 | Status: CANCELLED | OUTPATIENT
Start: 2021-12-01 | End: 2021-12-15

## 2021-12-02 RX ORDER — GABAPENTIN 300 MG/1
300 CAPSULE ORAL 3 TIMES DAILY
Qty: 90 CAPSULE | Refills: 1 | Status: SHIPPED | OUTPATIENT
Start: 2021-12-02 | End: 2022-05-03 | Stop reason: SDUPTHER

## 2022-01-11 ENCOUNTER — OFFICE VISIT (OUTPATIENT)
Dept: FAMILY MEDICINE | Facility: CLINIC | Age: 77
End: 2022-01-11
Payer: MEDICARE

## 2022-01-11 VITALS
BODY MASS INDEX: 27.94 KG/M2 | SYSTOLIC BLOOD PRESSURE: 129 MMHG | OXYGEN SATURATION: 99 % | TEMPERATURE: 98 F | HEIGHT: 67 IN | DIASTOLIC BLOOD PRESSURE: 82 MMHG | WEIGHT: 178 LBS | HEART RATE: 98 BPM

## 2022-01-11 DIAGNOSIS — B02.29 POST HERPETIC NEURALGIA: Primary | ICD-10-CM

## 2022-01-11 DIAGNOSIS — I10 ESSENTIAL HYPERTENSION: ICD-10-CM

## 2022-01-11 DIAGNOSIS — B02.7 DISSEMINATED HERPES ZOSTER: ICD-10-CM

## 2022-01-11 PROCEDURE — 99214 PR OFFICE/OUTPT VISIT, EST, LEVL IV, 30-39 MIN: ICD-10-PCS | Mod: S$GLB,,, | Performed by: INTERNAL MEDICINE

## 2022-01-11 PROCEDURE — 99214 OFFICE O/P EST MOD 30 MIN: CPT | Mod: S$GLB,,, | Performed by: INTERNAL MEDICINE

## 2022-01-11 RX ORDER — TRAMADOL HYDROCHLORIDE 50 MG/1
50 TABLET ORAL EVERY 6 HOURS PRN
Qty: 90 EACH | Refills: 2 | Status: SHIPPED | OUTPATIENT
Start: 2022-01-11 | End: 2022-01-11

## 2022-01-11 RX ORDER — AMITRIPTYLINE HYDROCHLORIDE 10 MG/1
10 TABLET, FILM COATED ORAL NIGHTLY PRN
Qty: 30 TABLET | Refills: 2 | Status: SHIPPED | OUTPATIENT
Start: 2022-01-11 | End: 2022-03-08 | Stop reason: SDUPTHER

## 2022-01-11 RX ORDER — TRAMADOL HYDROCHLORIDE 50 MG/1
50 TABLET ORAL EVERY 6 HOURS PRN
Qty: 90 EACH | Refills: 2 | Status: SHIPPED | OUTPATIENT
Start: 2022-01-11 | End: 2023-01-02

## 2022-01-11 RX ORDER — AMOXICILLIN 250 MG
1 CAPSULE ORAL 2 TIMES DAILY
Qty: 60 TABLET | Refills: 5 | Status: SHIPPED | OUTPATIENT
Start: 2022-01-11 | End: 2023-02-10

## 2022-01-11 RX ORDER — AMOXICILLIN 250 MG
1 CAPSULE ORAL 2 TIMES DAILY
Qty: 60 TABLET | Refills: 5 | COMMUNITY
Start: 2022-01-11 | End: 2022-01-11

## 2022-01-11 RX ORDER — AMITRIPTYLINE HYDROCHLORIDE 10 MG/1
10 TABLET, FILM COATED ORAL NIGHTLY PRN
Qty: 30 TABLET | Refills: 2 | Status: SHIPPED | OUTPATIENT
Start: 2022-01-11 | End: 2022-01-11

## 2022-01-11 NOTE — PATIENT INSTRUCTIONS
Get pain bloc with capsaicin on the Internet.  Lidocaine patch is up to 3 for 12 hours a day        If you have labs scheduled at [facility] you need to make an appointment. This is a measure to protect you from covid 19.      Thank you for choosing [facility].     Please fill out the patient experience survey.  The

## 2022-01-11 NOTE — PROGRESS NOTES
"Subjective:      2:56 PM     Patient ID: Michael Ann Jr. is a 76 y.o. male.    Chief Complaint: neuralgia (From shingles pt states, left side abdomen pain. No relief with any of his medication)    HPI     CHIEF :COMPLAINT:  Pain where he had shingles  HPI:  Had severe shingles in the left flank 2 months ago  ONSET:     2 months   ago.   DURATION: .  Continuous  QUALITY/COURSE: .  Improving  LOCATION:  Left flank  INTENSITY/SEVERITY: # 5/10 but was 10/10 pain for a month (on 1 to 10 scale)  CONTEXT/WHEN:          MODIFIERS/TREATMENTS: Taking Medications: ? .     Compliant with Taking Prescribed Medications: ? yes  . Prior X-Rays: no  Prior Labs: no        The below section is positive for the patient ONLY if BOLDED:    SYMPTOMS/RELATED:  Burning          CHIEF COMPLAINT: Hypertension  HPI:     ONSET:      QUALITY/COURSE:   Unchanged.     INTENSITY/SEVERITY:  Average blood pressure is unknown.      MODIFIERS/TREATMENTS:  Taking medications: yes. .High sodium intake: no. alcohol: no      The following symptoms are positive only if BOLDED, otherwise are negative.      SYMPTOMS/RELATED: Possible medication side effects include:   Depression..  . Cough. . Constipation.    REVIEW OF SYMPTOMS: . Weight_loss . Weight_gain . Leg_cramps .Potency_problems .    TARGET ORGAN DAMAGE:: angina/ prior myocardial infarction, chronic kidney disease, heart failure, left ventricular hypertrophy, peripheral artery disease, prior coronary revascularization, retinopathy, stroke. transient ischemic attack.    Lost weight because he was in so much pain he could not eat.  He is eating now  Review of Systems      Objective:      Vitals:    01/11/22 1441   BP: 129/82   Pulse: 98   Temp: 97.9 °F (36.6 °C)   TempSrc: Temporal   SpO2: 99%   Weight: 80.7 kg (178 lb)   Height: 5' 7" (1.702 m)   PainSc:   4     Physical Exam  Vitals and nursing note reviewed.   Constitutional:       Appearance: He is well-developed and well-nourished. "   Cardiovascular:      Rate and Rhythm: Normal rate and regular rhythm.      Heart sounds: Normal heart sounds.   Pulmonary:      Effort: Pulmonary effort is normal.      Breath sounds: Normal breath sounds.   Abdominal:      Palpations: Abdomen is soft.      Tenderness: There is no abdominal tenderness.   Skin:     Findings: Rash (Left flank) present.   Neurological:      Mental Status: He is alert.   Psychiatric:         Mood and Affect: Mood and affect normal.         Behavior: Behavior normal.         Thought Content: Thought content normal.       No results found for this or any previous visit (from the past 1008 hour(s)).       Assessment:       1. Post herpetic neuralgia    2. Disseminated herpes zoster    3. Essential hypertension          Plan:       Post herpetic neuralgia  -     amitriptyline (ELAVIL) 10 MG tablet; Take 1 tablet (10 mg total) by mouth nightly as needed for Pain.  Dispense: 30 tablet; Refill: 2  -     traMADoL (ULTRAM) 50 mg tablet; Take 1 tablet (50 mg total) by mouth every 6 (six) hours as needed for Pain.  Dispense: 90 each; Refill: 2  -     senna-docusate 8.6-50 mg (SENNA WITH DOCUSATE SODIUM) 8.6-50 mg per tablet; Take 1 tablet by mouth 2 (two) times daily.  Dispense: 60 tablet; Refill: 5    Disseminated herpes zoster    Essential hypertension  -     CBC Auto Differential; Future; Expected date: 01/11/2022  -     Comprehensive Metabolic Panel; Future; Expected date: 01/11/2022      Follow up in about 3 months (around 4/11/2022) for if you are not better return in one month.

## 2022-02-09 DIAGNOSIS — I10 ESSENTIAL HYPERTENSION: ICD-10-CM

## 2022-02-09 RX ORDER — AMLODIPINE BESYLATE 2.5 MG/1
2.5 TABLET ORAL DAILY
Qty: 90 TABLET | Refills: 3 | Status: SHIPPED | OUTPATIENT
Start: 2022-02-09 | End: 2023-01-02

## 2022-03-08 DIAGNOSIS — B02.29 POST HERPETIC NEURALGIA: ICD-10-CM

## 2022-03-08 RX ORDER — AMITRIPTYLINE HYDROCHLORIDE 10 MG/1
10 TABLET, FILM COATED ORAL NIGHTLY PRN
Qty: 30 TABLET | Refills: 2 | Status: SHIPPED | OUTPATIENT
Start: 2022-03-08 | End: 2022-04-07

## 2022-04-19 ENCOUNTER — LAB VISIT (OUTPATIENT)
Dept: LAB | Facility: HOSPITAL | Age: 77
End: 2022-04-19
Attending: INTERNAL MEDICINE
Payer: MEDICARE

## 2022-04-19 DIAGNOSIS — I10 ESSENTIAL HYPERTENSION: ICD-10-CM

## 2022-04-19 DIAGNOSIS — E78.5 HYPERLIPIDEMIA, UNSPECIFIED HYPERLIPIDEMIA TYPE: ICD-10-CM

## 2022-04-19 LAB
ALBUMIN SERPL BCP-MCNC: 3.5 G/DL (ref 3.5–5.2)
ALP SERPL-CCNC: 57 U/L (ref 55–135)
ALT SERPL W/O P-5'-P-CCNC: 27 U/L (ref 10–44)
ANION GAP SERPL CALC-SCNC: 9 MMOL/L (ref 8–16)
AST SERPL-CCNC: 25 U/L (ref 10–40)
BASOPHILS # BLD AUTO: 0.06 K/UL (ref 0–0.2)
BASOPHILS NFR BLD: 0.9 % (ref 0–1.9)
BILIRUB SERPL-MCNC: 1.2 MG/DL (ref 0.1–1)
BUN SERPL-MCNC: 20 MG/DL (ref 8–23)
CALCIUM SERPL-MCNC: 9.1 MG/DL (ref 8.7–10.5)
CHLORIDE SERPL-SCNC: 100 MMOL/L (ref 95–110)
CHOLEST SERPL-MCNC: 157 MG/DL (ref 120–199)
CHOLEST/HDLC SERPL: 4.2 {RATIO} (ref 2–5)
CO2 SERPL-SCNC: 30 MMOL/L (ref 23–29)
CREAT SERPL-MCNC: 0.8 MG/DL (ref 0.5–1.4)
DIFFERENTIAL METHOD: ABNORMAL
EOSINOPHIL # BLD AUTO: 0.2 K/UL (ref 0–0.5)
EOSINOPHIL NFR BLD: 2.3 % (ref 0–8)
ERYTHROCYTE [DISTWIDTH] IN BLOOD BY AUTOMATED COUNT: 12.4 % (ref 11.5–14.5)
EST. GFR  (AFRICAN AMERICAN): >60 ML/MIN/1.73 M^2
EST. GFR  (NON AFRICAN AMERICAN): >60 ML/MIN/1.73 M^2
GLUCOSE SERPL-MCNC: 91 MG/DL (ref 70–110)
HCT VFR BLD AUTO: 45.3 % (ref 40–54)
HDLC SERPL-MCNC: 37 MG/DL (ref 40–75)
HDLC SERPL: 23.6 % (ref 20–50)
HGB BLD-MCNC: 15.4 G/DL (ref 14–18)
IMM GRANULOCYTES # BLD AUTO: 0.01 K/UL (ref 0–0.04)
IMM GRANULOCYTES NFR BLD AUTO: 0.2 % (ref 0–0.5)
LDLC SERPL CALC-MCNC: 101 MG/DL (ref 63–159)
LYMPHOCYTES # BLD AUTO: 1.9 K/UL (ref 1–4.8)
LYMPHOCYTES NFR BLD: 28.4 % (ref 18–48)
MCH RBC QN AUTO: 33.6 PG (ref 27–31)
MCHC RBC AUTO-ENTMCNC: 34 G/DL (ref 32–36)
MCV RBC AUTO: 99 FL (ref 82–98)
MONOCYTES # BLD AUTO: 0.7 K/UL (ref 0.3–1)
MONOCYTES NFR BLD: 10.9 % (ref 4–15)
NEUTROPHILS # BLD AUTO: 3.8 K/UL (ref 1.8–7.7)
NEUTROPHILS NFR BLD: 57.3 % (ref 38–73)
NONHDLC SERPL-MCNC: 120 MG/DL
NRBC BLD-RTO: 0 /100 WBC
PLATELET # BLD AUTO: 222 K/UL (ref 150–450)
PMV BLD AUTO: 10.5 FL (ref 9.2–12.9)
POTASSIUM SERPL-SCNC: 3.4 MMOL/L (ref 3.5–5.1)
PROT SERPL-MCNC: 6.3 G/DL (ref 6–8.4)
RBC # BLD AUTO: 4.58 M/UL (ref 4.6–6.2)
SODIUM SERPL-SCNC: 139 MMOL/L (ref 136–145)
TRIGL SERPL-MCNC: 95 MG/DL (ref 30–150)
WBC # BLD AUTO: 6.54 K/UL (ref 3.9–12.7)

## 2022-04-19 PROCEDURE — 36415 COLL VENOUS BLD VENIPUNCTURE: CPT | Mod: PO | Performed by: INTERNAL MEDICINE

## 2022-04-19 PROCEDURE — 80061 LIPID PANEL: CPT | Performed by: INTERNAL MEDICINE

## 2022-04-19 PROCEDURE — 85025 COMPLETE CBC W/AUTO DIFF WBC: CPT | Performed by: INTERNAL MEDICINE

## 2022-04-19 PROCEDURE — 80053 COMPREHEN METABOLIC PANEL: CPT | Performed by: INTERNAL MEDICINE

## 2022-04-25 ENCOUNTER — TELEPHONE (OUTPATIENT)
Dept: FAMILY MEDICINE | Facility: CLINIC | Age: 77
End: 2022-04-25
Payer: MEDICARE

## 2022-04-25 NOTE — TELEPHONE ENCOUNTER
----- Message from Ana Goyal sent at 4/25/2022 11:00 AM CDT -----  Contact: pt  Type: Needs Medical Advice         Who Called: pt   Best Call Back Number:[phone]  Additional Information: Requesting a call back regarding pt wife tested pos for covid. Pt is not currently showing symptoms. Pt has appt on 04/27. Pt needs to know if he soul get tested and if he can keep his appt or need to reschedule   Please Advise- Thank you

## 2022-04-25 NOTE — TELEPHONE ENCOUNTER
Patient was advised to cancel appointment on 4- due to wife's Covid positive status. Advised he would need to quarantine despite not having any symptoms. Patient states he agrees to cancel appointment. States he plans to be tested for Covid on tomorrow. States he will call back to reschedule once quarantine time frame has passed, and his wife is completely feeling better.

## 2022-04-26 ENCOUNTER — OFFICE VISIT (OUTPATIENT)
Dept: URGENT CARE | Facility: CLINIC | Age: 77
End: 2022-04-26
Payer: MEDICARE

## 2022-04-26 VITALS
TEMPERATURE: 97 F | RESPIRATION RATE: 16 BRPM | HEIGHT: 67 IN | DIASTOLIC BLOOD PRESSURE: 73 MMHG | BODY MASS INDEX: 27.78 KG/M2 | OXYGEN SATURATION: 98 % | WEIGHT: 177 LBS | SYSTOLIC BLOOD PRESSURE: 121 MMHG | HEART RATE: 59 BPM

## 2022-04-26 DIAGNOSIS — Z20.822 EXPOSURE TO CONFIRMED CASE OF COVID-19: Primary | ICD-10-CM

## 2022-04-26 LAB
CTP QC/QA: YES
SARS-COV-2 AG RESP QL IA.RAPID: NEGATIVE

## 2022-04-26 PROCEDURE — 87811 SARS CORONAVIRUS 2 ANTIGEN POCT, MANUAL READ: ICD-10-PCS | Mod: QW,CR,S$GLB, | Performed by: NURSE PRACTITIONER

## 2022-04-26 PROCEDURE — 87811 SARS-COV-2 COVID19 W/OPTIC: CPT | Mod: QW,CR,S$GLB, | Performed by: NURSE PRACTITIONER

## 2022-04-26 PROCEDURE — 99203 OFFICE O/P NEW LOW 30 MIN: CPT | Mod: CR,S$GLB,, | Performed by: NURSE PRACTITIONER

## 2022-04-26 PROCEDURE — 99203 PR OFFICE/OUTPT VISIT, NEW, LEVL III, 30-44 MIN: ICD-10-PCS | Mod: CR,S$GLB,, | Performed by: NURSE PRACTITIONER

## 2022-04-26 NOTE — PATIENT INSTRUCTIONS
General Discharge Instructions   If you were prescribed a narcotic or controlled medication, do not drive or operate heavy equipment or machinery while taking these medications.  If you were prescribed antibiotics, please take them to completion.  You must understand that you've received an Urgent Care treatment only and that you may be released before all your medical problems are known or treated. You, the patient, will arrange for follow up care as instructed.  Follow up with your PCP or specialty clinic as directed in the next 1-2 weeks if not improved or as needed.  You can call [phone] to schedule an appointment with the appropriate provider.  If your condition worsens we recommend that you receive another evaluation at the emergency room immediately or contact your primary medical clinics after hours call service to discuss your concerns.  Please return here or go to the Emergency Department for any concerns or worsening of condition.      WE CANNOT RULE OUT ALL POSSIBLE CAUSES OF YOUR SYMPTOMS IN THE URGENT CARE SETTING PLEASE GO TO THE ER IF YOU FEELS YOUR CONDITION IS WORSENING OR YOU WOULD LIKE EMERGENT EVALUATION.

## 2022-04-26 NOTE — PROGRESS NOTES
"Subjective:       Patient ID: Michael Ann Jr. is a 77 y.o. male.    Vitals:  height is 5' 7" (1.702 m) and weight is 80.3 kg (177 lb). His oral temperature is 97.3 °F (36.3 °C). His blood pressure is 121/73 and his pulse is 59 (abnormal). His respiration is 16 and oxygen saturation is 98%.     Chief Complaint: COVID-19 Concerns    Pt states "Wife tested positive for covid here yesterday and was told to come be tested; pt denies any symptoms."    ROS        Objective:      Physical Exam   HENT:   Head: Normocephalic and atraumatic.   Ears:   Right Ear: External ear normal.   Left Ear: External ear normal.   Nose: Nose normal.   Mouth/Throat: Mucous membranes are moist. Oropharynx is clear.   Pulmonary/Chest: Effort normal and breath sounds normal.   Abdominal: Normal appearance. Soft.   Neurological: He is alert.   Nursing note and vitals reviewed.        Results for orders placed or performed in visit on 04/26/22   SARS Coronavirus 2 Antigen, POCT Manual Read   Result Value Ref Range    SARS Coronavirus 2 Antigen Negative Negative     Acceptable Yes      Assessment:       1. Exposure to confirmed case of COVID-19          Plan:         Exposure to confirmed case of COVID-19  -     SARS Coronavirus 2 Antigen, POCT Manual Read                  Patient Instructions   General Discharge Instructions   If you were prescribed a narcotic or controlled medication, do not drive or operate heavy equipment or machinery while taking these medications.  If you were prescribed antibiotics, please take them to completion.  You must understand that you've received an Urgent Care treatment only and that you may be released before all your medical problems are known or treated. You, the patient, will arrange for follow up care as instructed.  Follow up with your PCP or specialty clinic as directed in the next 1-2 weeks if not improved or as needed.  You can call [phone] to schedule an appointment with " the appropriate provider.  If your condition worsens we recommend that you receive another evaluation at the emergency room immediately or contact your primary medical clinics after hours call service to discuss your concerns.  Please return here or go to the Emergency Department for any concerns or worsening of condition.      WE CANNOT RULE OUT ALL POSSIBLE CAUSES OF YOUR SYMPTOMS IN THE URGENT CARE SETTING PLEASE GO TO THE ER IF YOU FEELS YOUR CONDITION IS WORSENING OR YOU WOULD LIKE EMERGENT EVALUATION.

## 2022-05-03 ENCOUNTER — TELEPHONE (OUTPATIENT)
Dept: FAMILY MEDICINE | Facility: CLINIC | Age: 77
End: 2022-05-03
Payer: MEDICARE

## 2022-05-03 DIAGNOSIS — B02.7 DISSEMINATED HERPES ZOSTER: ICD-10-CM

## 2022-05-03 RX ORDER — GABAPENTIN 300 MG/1
300 CAPSULE ORAL 3 TIMES DAILY
Qty: 90 CAPSULE | Refills: 1 | Status: SHIPPED | OUTPATIENT
Start: 2022-05-03 | End: 2022-10-14 | Stop reason: SDUPTHER

## 2022-05-03 NOTE — TELEPHONE ENCOUNTER
Former patient of Dr. Baptiste. Has existing appointment to establish care with Dr. Purvis on 6-. Requesting refill of Gabapentin. Preferred pharmacy: [facility]. Please advise. Thank you.

## 2022-05-03 NOTE — TELEPHONE ENCOUNTER
Appointment to establish care scheduled for the date of 6-. Patient's wife (Izabel) agreed to appointment date, time, and location.

## 2022-05-03 NOTE — TELEPHONE ENCOUNTER
----- Message from Kely Frazier sent at 5/3/2022  1:43 PM CDT -----  Contact: pt [phone]  Type:  Sooner Appointment Request    Caller is requesting a sooner appointment.  Caller declined first available appointment listed below.  Caller will not accept being placed on the waitlist and is requesting a message be sent to doctor.    Name of Caller:  Pt  When is the first available appointment?  6/22  Symptoms:  Est care and med refill  Best Call Back Number:  [phone]    Additional Information:  Pt had to cancel appt for 4/28 due to covid. Pls call back and advise     Pt would like to schedule appt with her  also Izabel Ann

## 2022-05-19 ENCOUNTER — TELEPHONE (OUTPATIENT)
Dept: FAMILY MEDICINE | Facility: CLINIC | Age: 77
End: 2022-05-19
Payer: MEDICARE

## 2022-06-15 ENCOUNTER — OFFICE VISIT (OUTPATIENT)
Dept: FAMILY MEDICINE | Facility: CLINIC | Age: 77
End: 2022-06-15
Payer: MEDICARE

## 2022-06-15 VITALS
BODY MASS INDEX: 28.07 KG/M2 | HEART RATE: 69 BPM | TEMPERATURE: 98 F | OXYGEN SATURATION: 96 % | HEIGHT: 67 IN | DIASTOLIC BLOOD PRESSURE: 64 MMHG | SYSTOLIC BLOOD PRESSURE: 110 MMHG | WEIGHT: 178.81 LBS | RESPIRATION RATE: 17 BRPM

## 2022-06-15 DIAGNOSIS — R26.81 GAIT INSTABILITY: Primary | ICD-10-CM

## 2022-06-15 DIAGNOSIS — B02.29 HZV (HERPES ZOSTER VIRUS) POST HERPETIC NEURALGIA: ICD-10-CM

## 2022-06-15 PROCEDURE — 99999 PR PBB SHADOW E&M-EST. PATIENT-LVL V: CPT | Mod: PBBFAC,,, | Performed by: FAMILY MEDICINE

## 2022-06-15 PROCEDURE — 99212 OFFICE O/P EST SF 10 MIN: CPT | Mod: S$PBB,,, | Performed by: FAMILY MEDICINE

## 2022-06-15 PROCEDURE — 99212 PR OFFICE/OUTPT VISIT, EST, LEVL II, 10-19 MIN: ICD-10-PCS | Mod: S$PBB,,, | Performed by: FAMILY MEDICINE

## 2022-06-15 PROCEDURE — 99999 PR PBB SHADOW E&M-EST. PATIENT-LVL V: ICD-10-PCS | Mod: PBBFAC,,, | Performed by: FAMILY MEDICINE

## 2022-06-15 PROCEDURE — 99215 OFFICE O/P EST HI 40 MIN: CPT | Mod: PBBFAC,PO | Performed by: FAMILY MEDICINE

## 2022-06-15 NOTE — PATIENT INSTRUCTIONS
Omari Rodriguez,     If you are due for any health screening(s) below please notify me so we can arrange them to be ordered and scheduled to maintain your health. Most healthy patients complete it. Don't lose out on improving your health.     Health Maintenance   Topic Date Due    Lipid Panel  04/19/2023    TETANUS VACCINE  11/15/2026    Hepatitis C Screening  Completed

## 2022-06-15 NOTE — PROGRESS NOTES
Subjective:       Patient ID: Michael Ann Jr. is a 77 y.o. male.    Chief Complaint: Establish Care    Patient is a 77 year old female  with history of diaphragmatic hernia, erectile dysfunction following prostatectomy, stress incontinence, basal cell carcinoma of left shoulder, idiopathic peripheral neuropathy, lumbar radiculopathy, bilateral foot drop, m presenting for routine health maintenance and to establish care, patient endorses compliance with medication regimen and denies any acute symptoms today.           Current Outpatient Medications:     albuterol (PROVENTIL/VENTOLIN HFA) 90 mcg/actuation inhaler, Inhale 2 puffs into the lungs every 6 (six) hours as needed for Wheezing., Disp: 1 each, Rfl: 11    amitriptyline (ELAVIL) 10 MG tablet, TAKE 1 TABLET BY MOUTH AT  NIGHT AS NEEDED FOR PAIN, Disp: 30 tablet, Rfl: 11    amLODIPine (NORVASC) 2.5 MG tablet, Take 1 tablet (2.5 mg total) by mouth once daily., Disp: 90 tablet, Rfl: 3    atorvastatin (LIPITOR) 40 MG tablet, TAKE 1 TABLET BY MOUTH ONCE DAILY, Disp: 90 tablet, Rfl: 3    fluticasone propionate (FLONASE) 50 mcg/actuation nasal spray, 2 sprays (100 mcg total) by Each Nostril route once daily., Disp: 16 g, Rfl: 11    FLUZONE HIGH-DOSE 2018-19, PF, 180 mcg/0.5 mL vaccine, ADM 0.5ML IM UTD, Disp: , Rfl: 0    gabapentin (NEURONTIN) 300 MG capsule, Take 1 capsule (300 mg total) by mouth 3 (three) times daily. (Patient taking differently: Take 300 mg by mouth every evening.), Disp: 90 capsule, Rfl: 1    ipratropium (ATROVENT) 42 mcg (0.06 %) nasal spray, 2 sprays by Nasal route 4 (four) times daily., Disp: 15 mL, Rfl: 5    ketoconazole (NIZORAL) 2 % cream, Apply twice a day to rash on face when needed., Disp: 30 g, Rfl: 5    ketoconazole (NIZORAL) 2 % shampoo, Use to wash hair qod for itchy/scaly scalp, Disp: 120 mL, Rfl: 5    miSOPROStoL (CYTOTEC) 100 MCG Tab, TAKE 1 TABLET BY MOUTH  TWICE DAILY WITH MEALS, Disp: 180 tablet, Rfl: 3     multivit-min/FA/lycopen/lutein (CENTRUM SILVER MEN ORAL), Take by mouth., Disp: , Rfl:     pantoprazole (PROTONIX) 40 MG tablet, Take 1 tablet (40 mg total) by mouth once daily., Disp: 90 tablet, Rfl: 3    PREVNAR 13, PF, 0.5 mL Syrg, ADM 0.5ML IM UTD, Disp: , Rfl: 0    senna-docusate 8.6-50 mg (SENNA WITH DOCUSATE SODIUM) 8.6-50 mg per tablet, Take 1 tablet by mouth 2 (two) times daily., Disp: 60 tablet, Rfl: 5    traMADoL (ULTRAM) 50 mg tablet, Take 1 tablet (50 mg total) by mouth every 6 (six) hours as needed for Pain., Disp: 90 each, Rfl: 2    triamterene-hydrochlorothiazide 37.5-25 mg (MAXZIDE-25) 37.5-25 mg per tablet, Take 1 tablet by mouth once daily., Disp: 90 tablet, Rfl: 3    valACYclovir (VALTREX) 1000 MG tablet, Take 1 tablet (1,000 mg total) by mouth 3 (three) times daily. for 14 days, Disp: 42 tablet, Rfl: 0    Review of patient's allergies indicates:  No Known Allergies    Past Medical History:   Diagnosis Date    Colon polyps     Hearing loss     Hiatal hernia     Hypertension     Prostate disease     Squamous cell carcinoma of skin     Tuberculosis        Past Surgical History:   Procedure Laterality Date    eye lid surgey      HAND SURGERY      HAND SURGERY      HERNIA REPAIR      HERNIA REPAIR      NECK SURGERY      NECK SURGERY      PROSTATE SURGERY      PROSTATECTOMY      SHOULDER SURGERY      SKIN CANCER EXCISION      Skin Cancer removed from his left arm and left shoulder         Family History   Problem Relation Age of Onset    Prostate cancer Brother     Prostate cancer Brother        Social History     Tobacco Use    Smoking status: Never Smoker    Smokeless tobacco: Never Used   Substance Use Topics    Alcohol use: No     Alcohol/week: 0.0 standard drinks    Drug use: No       Review of Systems   Constitutional: Negative for activity change, appetite change, chills, diaphoresis, fatigue, fever and unexpected weight change.   HENT: Negative for hearing loss,  "postnasal drip, rhinorrhea, sinus pressure/congestion, sore throat and trouble swallowing.    Eyes: Negative for visual disturbance.   Respiratory: Negative for apnea, chest tightness, shortness of breath and wheezing.    Cardiovascular: Negative for chest pain.   Gastrointestinal: Negative for abdominal pain, blood in stool, change in bowel habit, constipation, diarrhea, nausea, vomiting and change in bowel habit.   Endocrine: Negative for polydipsia and polyphagia.   Genitourinary: Negative for dysuria, enuresis, flank pain, frequency and urgency.   Integumentary:  Negative for rash and mole/lesion.   Neurological: Negative for dizziness, light-headedness, headaches and memory loss.   Psychiatric/Behavioral: Negative for confusion, decreased concentration, sleep disturbance and suicidal ideas. The patient is not nervous/anxious.            Objective:      Vitals:    06/15/22 1021   BP: 110/64   BP Location: Right arm   Patient Position: Sitting   BP Method: Medium (Manual)   Pulse: 69   Resp: 17   Temp: 98 °F (36.7 °C)   TempSrc: Oral   SpO2: 96%   Weight: 81.1 kg (178 lb 12.7 oz)   Height: 5' 7" (1.702 m)     Physical Exam  Constitutional:       General: He is not in acute distress.     Appearance: He is obese. He is not ill-appearing, toxic-appearing or diaphoretic.   HENT:      Head: Normocephalic and atraumatic.      Nose: Nose normal. No congestion or rhinorrhea.      Mouth/Throat:      Mouth: Mucous membranes are moist.      Pharynx: No oropharyngeal exudate or posterior oropharyngeal erythema.   Eyes:      General: No scleral icterus.        Right eye: No discharge.         Left eye: No discharge.      Conjunctiva/sclera: Conjunctivae normal.      Pupils: Pupils are equal, round, and reactive to light.   Cardiovascular:      Rate and Rhythm: Normal rate and regular rhythm.      Pulses: Normal pulses.      Heart sounds: Normal heart sounds. No murmur heard.    No friction rub. No gallop.   Pulmonary:      " Effort: Pulmonary effort is normal. No respiratory distress.      Breath sounds: Normal breath sounds. No stridor. No wheezing, rhonchi or rales.   Chest:      Chest wall: No tenderness.   Abdominal:      General: Abdomen is flat. Bowel sounds are normal. There is no distension.      Palpations: Abdomen is soft. There is no mass.      Tenderness: There is no abdominal tenderness. There is no guarding or rebound.      Hernia: No hernia is present.   Musculoskeletal:      Cervical back: No rigidity or tenderness.   Neurological:      Mental Status: He is alert.           Assessment:       1. Gait instability    2. HZV (herpes zoster virus) post herpetic neuralgia        Plan:           Gait instability  -     WALKER FOR HOME USE  -     Ambulatory referral/consult to Physical/Occupational Therapy; Future; Expected date: 06/22/2022    HZV (herpes zoster virus) post herpetic neuralgia        Follow up in about 6 months (around 12/15/2022).    Sarahy Purvis MD           DISCLAIMER: This note was prepared with [employer] Naturally Speaking voice recognition transcription software. Garbled syntax, mangled pronouns, and other bizarre constructions may be attributed to that software system.

## 2022-07-08 DIAGNOSIS — R60.0 BILATERAL LEG EDEMA: ICD-10-CM

## 2022-07-08 DIAGNOSIS — I10 ESSENTIAL HYPERTENSION: ICD-10-CM

## 2022-07-08 RX ORDER — TRIAMTERENE/HYDROCHLOROTHIAZID 37.5-25 MG
1 TABLET ORAL DAILY
Qty: 90 TABLET | Refills: 3 | Status: SHIPPED | OUTPATIENT
Start: 2022-07-08 | End: 2023-02-10 | Stop reason: SDUPTHER

## 2022-07-08 NOTE — TELEPHONE ENCOUNTER
----- Message from Manohar Bird sent at 7/8/2022  9:42 AM CDT -----  Type:  RX Refill Request    Who Called:  Izabel Ann (Spouse)  Refill or New Rx:   RX Name and Strength: triamterene-hydrochlorothiazide 37.5-25 mg (MAXZIDE-25) 37.5-25 mg per tablet  How is the patient currently taking it? (ex. 1XDay):  1XDay  Is this a 30 day or 90 day RX:  90    Preferred Pharmacy with phone number:    [facility]  ([facility]) - [place], [place] - [address]  [address]  [facility] [address]  [facility] [address]  Phone: [phone] Fax: [phone]        Local or Mail Order: Mail Order  Ordering Provider:  Jah Jimenez Call Back Number:  [phone]  Additional Information:

## 2022-09-24 ENCOUNTER — IMMUNIZATION (OUTPATIENT)
Dept: FAMILY MEDICINE | Facility: CLINIC | Age: 77
End: 2022-09-24
Payer: MEDICARE

## 2022-09-24 PROCEDURE — G0008 ADMIN INFLUENZA VIRUS VAC: HCPCS | Mod: PBBFAC,PO

## 2022-10-14 DIAGNOSIS — B02.7 DISSEMINATED HERPES ZOSTER: ICD-10-CM

## 2022-10-14 DIAGNOSIS — B02.29 POST HERPETIC NEURALGIA: ICD-10-CM

## 2022-10-14 RX ORDER — GABAPENTIN 300 MG/1
300 CAPSULE ORAL 3 TIMES DAILY
Qty: 90 CAPSULE | Refills: 1 | Status: SHIPPED | OUTPATIENT
Start: 2022-10-14 | End: 2022-11-26

## 2022-10-14 RX ORDER — TRAMADOL HYDROCHLORIDE 50 MG/1
50 TABLET ORAL EVERY 6 HOURS PRN
Qty: 90 EACH | Refills: 2 | Status: CANCELLED | OUTPATIENT
Start: 2022-10-14

## 2022-10-14 NOTE — TELEPHONE ENCOUNTER
----- Message from Ray Malone sent at 10/14/2022  4:02 PM CDT -----  Type:  RX Refill Request    Who Called:  patient's wife  Refill or New Rx:  refill  RX Name and Strength:  traMADoL (ULTRAM) 50 mg tablet & gabapentin (NEURONTIN) 300 MG capsule  How is the patient currently taking it? (ex. 1XDay):  1x day  Is this a 30 day or 90 day RX:    Preferred Pharmacy with phone number:    [facility]  ([facility]) - [address] [address]  Phone: [phone] Fax: [phone]     Local or Mail Order:  Local  Ordering Provider:  Mail  Best Call Back Number:  [phone]   Additional Information:

## 2022-10-14 NOTE — TELEPHONE ENCOUNTER
LOV 6/15/22- Dr. Purvis and prior Dr. Baptiste  NOV 2/10/23    Left message for patient to return call to clinic to schedule sooner appt.    Pt has not seen and [facility] prior

## 2022-10-18 ENCOUNTER — NURSE TRIAGE (OUTPATIENT)
Dept: ADMINISTRATIVE | Facility: CLINIC | Age: 77
End: 2022-10-18
Payer: MEDICARE

## 2022-10-18 ENCOUNTER — OFFICE VISIT (OUTPATIENT)
Dept: URGENT CARE | Facility: CLINIC | Age: 77
End: 2022-10-18
Payer: MEDICARE

## 2022-10-18 VITALS
OXYGEN SATURATION: 95 % | TEMPERATURE: 98 F | DIASTOLIC BLOOD PRESSURE: 72 MMHG | SYSTOLIC BLOOD PRESSURE: 103 MMHG | HEIGHT: 67 IN | RESPIRATION RATE: 16 BRPM | WEIGHT: 182 LBS | BODY MASS INDEX: 28.56 KG/M2 | HEART RATE: 86 BPM

## 2022-10-18 DIAGNOSIS — J06.9 URI WITH COUGH AND CONGESTION: Primary | ICD-10-CM

## 2022-10-18 DIAGNOSIS — U07.1 COVID-19 VIRUS INFECTION: ICD-10-CM

## 2022-10-18 DIAGNOSIS — R89.4 INFLUENZA A VIRUS NOT DETECTED: ICD-10-CM

## 2022-10-18 LAB
CTP QC/QA: YES
CTP QC/QA: YES
FLUAV AG NPH QL: NEGATIVE
FLUBV AG NPH QL: NEGATIVE
SARS-COV-2 AG RESP QL IA.RAPID: POSITIVE

## 2022-10-18 PROCEDURE — 99214 PR OFFICE/OUTPT VISIT, EST, LEVL IV, 30-39 MIN: ICD-10-PCS | Mod: CS,S$GLB,, | Performed by: NURSE PRACTITIONER

## 2022-10-18 PROCEDURE — 87804 POCT INFLUENZA A/B: ICD-10-PCS | Mod: 59,QW,CR, | Performed by: NURSE PRACTITIONER

## 2022-10-18 PROCEDURE — 87811 SARS-COV-2 COVID19 W/OPTIC: CPT | Mod: QW,CR,S$GLB, | Performed by: NURSE PRACTITIONER

## 2022-10-18 PROCEDURE — 99214 OFFICE O/P EST MOD 30 MIN: CPT | Mod: CS,S$GLB,, | Performed by: NURSE PRACTITIONER

## 2022-10-18 PROCEDURE — 87811 SARS CORONAVIRUS 2 ANTIGEN POCT, MANUAL READ: ICD-10-PCS | Mod: QW,CR,S$GLB, | Performed by: NURSE PRACTITIONER

## 2022-10-18 PROCEDURE — 87804 INFLUENZA ASSAY W/OPTIC: CPT | Mod: QW,CR,, | Performed by: NURSE PRACTITIONER

## 2022-10-18 RX ORDER — CETIRIZINE HYDROCHLORIDE 10 MG/1
10 TABLET ORAL DAILY PRN
Qty: 7 TABLET | Refills: 0 | Status: SHIPPED | OUTPATIENT
Start: 2022-10-18 | End: 2023-01-02

## 2022-10-18 RX ORDER — FLUTICASONE PROPIONATE 50 MCG
2 SPRAY, SUSPENSION (ML) NASAL DAILY PRN
Qty: 15.8 ML | Refills: 0 | Status: SHIPPED | OUTPATIENT
Start: 2022-10-18 | End: 2022-11-01

## 2022-10-18 NOTE — PROGRESS NOTES
"Subjective:       Patient ID: Michael Ann Jr. is a 77 y.o. male.    Vitals:  height is 5' 7" (1.702 m) and weight is 82.6 kg (182 lb). His oral temperature is 98 °F (36.7 °C). His blood pressure is 103/72 and his pulse is 86. His respiration is 16 and oxygen saturation is 95%.     Chief Complaint: COVID-19 Concerns    Pt states  he has had a fever with relatively mild productive cough, runny nose, and congestion. His symptoms started 4 days ago. He has been self treating with saline nasal spray and tylenol and reports good symptom control. He reports wanting to be seen today to get a flu and covid test.     Constitution: Positive for fever. Negative for chills.   HENT:  Positive for congestion. Negative for ear pain, sinus pressure and sore throat.    Neck: Negative for painful lymph nodes.   Cardiovascular:  Negative for chest pain, palpitations and sob on exertion.   Respiratory:  Positive for cough and sputum production. Negative for shortness of breath.    Gastrointestinal:  Negative for abdominal pain, nausea, vomiting and diarrhea.   Skin:  Negative for rash.   Neurological:  Negative for dizziness, light-headedness, passing out, disorientation and altered mental status.   Hematologic/Lymphatic: Negative for swollen lymph nodes.   Psychiatric/Behavioral:  Negative for altered mental status, disorientation and confusion.      Objective:      Physical Exam   Constitutional: He is oriented to person, place, and time. He appears well-developed. He is cooperative.  Non-toxic appearance. He does not appear ill. No distress.   HENT:   Head: Normocephalic and atraumatic.   Ears:   Right Ear: Hearing, tympanic membrane, external ear and ear canal normal.   Left Ear: Hearing, tympanic membrane, external ear and ear canal normal.   Nose: Rhinorrhea present. No mucosal edema, purulent discharge, nasal deformity or congestion. No epistaxis. Right sinus exhibits no maxillary sinus tenderness and no frontal sinus " tenderness. Left sinus exhibits no maxillary sinus tenderness and no frontal sinus tenderness.   Mouth/Throat: Uvula is midline, oropharynx is clear and moist and mucous membranes are normal. Mucous membranes are moist. No trismus in the jaw. Normal dentition. No uvula swelling. No oropharyngeal exudate, posterior oropharyngeal edema, posterior oropharyngeal erythema, tonsillar abscesses or cobblestoning. Tonsils are 0 on the right. Tonsils are 0 on the left. Oropharynx is clear.   Eyes: Conjunctivae and lids are normal. No scleral icterus.   Neck: Trachea normal and phonation normal. Neck supple. No edema present. No erythema present. No neck rigidity present.   Cardiovascular: Normal rate, regular rhythm, normal heart sounds and normal pulses.   Pulmonary/Chest: Effort normal and breath sounds normal. No respiratory distress. He has no decreased breath sounds. He has no rhonchi.   Abdominal: Normal appearance.   Musculoskeletal: Normal range of motion.         General: No deformity. Normal range of motion.   Lymphadenopathy:     He has no cervical adenopathy.   Neurological: no focal deficit. He is alert and oriented to person, place, and time. He exhibits normal muscle tone. Coordination normal.   Skin: Skin is warm, dry, intact, not diaphoretic and not pale. Capillary refill takes 2 to 3 seconds.   Psychiatric: His speech is normal and behavior is normal. Judgment and thought content normal.   Nursing note and vitals reviewed.      Assessment:       1. URI with cough and congestion    2. COVID-19 virus infection    3. Influenza A virus not detected          Plan:         URI with cough and congestion  -     SARS Coronavirus 2 Antigen, POCT Manual Read  -     POCT Influenza A/B    COVID-19 virus infection    Influenza A virus not detected       4=covid risk score     I have discussed the test results and physical exam findings with the patient. We discussed paxlovid anti viral medication and symptomatic care at  length including benefits and possible drawbacks to each.  At this time he states he does not want to take paxlovid and elects to continue with symptomatic care. We discussed symptom monitoring, conservative care methods, medication use, and follow up orders. The patient verbalized understanding and agreement with the plan of care.     Quarantine at home for 5 days from onset of symptoms, AND 24 hours fever free without the use of antipyretic medication. After returning to work, you must wear face coverings until 10 days from onset of symptoms  Tylenol/motrin otc for fever/pain  Stop all over the counter cold, sinus, flu medications  Increase clear fluid intake.    Flonase nasal spray, zyrtec, and mucinex dm as needed  May use warm saltwater gargles 4 x daily and OTC anesthetic throat lozenges for sore throat  Monitor your phone for calls from [facility] home monitoring system.  Follow up with PCP after quarantine for evaluation  Go to ER for shortness of breath, chest pain, or other emergent concern  Return to clinic for new, worse symptoms.

## 2022-10-18 NOTE — LETTER
October 18, 2022      [facility]  [address]  [facility] [address]  Phone: [phone]       Patient: Michael Ann   YOB: 1945  Date of Visit: 10/18/2022    To Whom It May Concern:    Alvarez Ann  was at [facility] on 10/18/2022. The patient may return to work/school on 10/21/22with restrictions to wear a mask around others until 10/24/2022. If you have any questions or concerns, or if I can be of further assistance, please do not hesitate to contact me.    Sincerely,    Homer Dasilva Jr., BERTHAP-C

## 2022-10-18 NOTE — PATIENT INSTRUCTIONS
Quarantine at home for 5 days from onset of symptoms, AND 24 hours fever free without the use of antipyretic medication. After returning to work, you must wear face coverings until 10 days from onset of symptoms  Tylenol/motrin otc for fever/pain  Stop all over the counter cold, sinus, flu medications  Increase clear fluid intake.    Flonase nasal spray, zyrtec, and mucinex dm as needed  May use warm saltwater gargles 4 x daily and OTC anesthetic throat lozenges for sore throat  Monitor your phone for calls from [facility] home monitoring system.  Follow up with PCP after quarantine for evaluation  Go to ER for shortness of breath, chest pain, or other emergent concern  Return to clinic for new, worse symptoms.

## 2022-10-18 NOTE — TELEPHONE ENCOUNTER
1st surveillance program enrollment attempt.  Pt resides in the state of [place] therefore does not qualify for surveillance program.  Pt was enrolled in [facility] program.  Tasks removed.    Reason for Disposition   Information only question and nurse able to answer    Additional Information   Negative: Nursing judgment   Negative: Nursing judgment   Negative: Nursing judgment   Negative: Nursing judgment    Protocols used: Information Only Call - No Triage-A-[place]

## 2022-11-07 ENCOUNTER — IMMUNIZATION (OUTPATIENT)
Dept: PRIMARY CARE CLINIC | Facility: CLINIC | Age: 77
End: 2022-11-07
Payer: MEDICARE

## 2022-11-07 DIAGNOSIS — Z23 NEED FOR VACCINATION: Primary | ICD-10-CM

## 2022-11-07 PROCEDURE — 91312 COVID-19, MRNA, LNP-S, BIVALENT BOOSTER, PF, 30 MCG/0.3 ML DOSE: CPT | Mod: S$GLB,,, | Performed by: FAMILY MEDICINE

## 2022-11-07 PROCEDURE — 0124A COVID-19, MRNA, LNP-S, BIVALENT BOOSTER, PF, 30 MCG/0.3 ML DOSE: CPT | Mod: S$GLB,,, | Performed by: FAMILY MEDICINE

## 2022-11-07 PROCEDURE — 0124A COVID-19, MRNA, LNP-S, BIVALENT BOOSTER, PF, 30 MCG/0.3 ML DOSE: ICD-10-PCS | Mod: S$GLB,,, | Performed by: FAMILY MEDICINE

## 2022-11-07 PROCEDURE — 91312 COVID-19, MRNA, LNP-S, BIVALENT BOOSTER, PF, 30 MCG/0.3 ML DOSE: ICD-10-PCS | Mod: S$GLB,,, | Performed by: FAMILY MEDICINE

## 2023-01-02 ENCOUNTER — OFFICE VISIT (OUTPATIENT)
Dept: URGENT CARE | Facility: CLINIC | Age: 78
End: 2023-01-02
Attending: NURSE PRACTITIONER
Payer: MEDICARE

## 2023-01-02 VITALS
DIASTOLIC BLOOD PRESSURE: 85 MMHG | BODY MASS INDEX: 28.09 KG/M2 | RESPIRATION RATE: 16 BRPM | OXYGEN SATURATION: 99 % | WEIGHT: 179 LBS | TEMPERATURE: 98 F | SYSTOLIC BLOOD PRESSURE: 133 MMHG | HEART RATE: 66 BPM | HEIGHT: 67 IN

## 2023-01-02 DIAGNOSIS — U07.1 COVID-19 VIRUS DETECTED: ICD-10-CM

## 2023-01-02 DIAGNOSIS — R05.9 COUGH, UNSPECIFIED TYPE: Primary | ICD-10-CM

## 2023-01-02 PROCEDURE — 87804 POCT INFLUENZA A/B: ICD-10-PCS | Mod: QW,,, | Performed by: NURSE PRACTITIONER

## 2023-01-02 PROCEDURE — 99214 PR OFFICE/OUTPT VISIT, EST, LEVL IV, 30-39 MIN: ICD-10-PCS | Mod: CS,S$GLB,, | Performed by: NURSE PRACTITIONER

## 2023-01-02 PROCEDURE — 87804 INFLUENZA ASSAY W/OPTIC: CPT | Mod: QW,,, | Performed by: NURSE PRACTITIONER

## 2023-01-02 PROCEDURE — 87811 SARS CORONAVIRUS 2 ANTIGEN POCT, MANUAL READ: ICD-10-PCS | Mod: QW,CR,S$GLB, | Performed by: NURSE PRACTITIONER

## 2023-01-02 PROCEDURE — 87811 SARS-COV-2 COVID19 W/OPTIC: CPT | Mod: QW,CR,S$GLB, | Performed by: NURSE PRACTITIONER

## 2023-01-02 PROCEDURE — 99214 OFFICE O/P EST MOD 30 MIN: CPT | Mod: CS,S$GLB,, | Performed by: NURSE PRACTITIONER

## 2023-01-02 RX ORDER — ALBUTEROL SULFATE 90 UG/1
1 AEROSOL, METERED RESPIRATORY (INHALATION) EVERY 6 HOURS PRN
Qty: 18 G | Refills: 0 | Status: SHIPPED | OUTPATIENT
Start: 2023-01-02 | End: 2023-02-10 | Stop reason: SDUPTHER

## 2023-01-02 NOTE — PROGRESS NOTES
"Subjective:       Patient ID: Michael Ann Jr. is a 77 y.o. male.    Vitals:  height is 5' 7" (1.702 m) and weight is 81.2 kg (179 lb). His temperature is 97.8 °F (36.6 °C). His blood pressure is 133/85 and his pulse is 66. His respiration is 16 and oxygen saturation is 99%.     Chief Complaint: Cough    Pt states he is having a little congestion, slight cough. Denies any other symptoms. Has tried Mucinex and tussin DM. Pt has had symptoms X 1 week.       HENT:  Positive for congestion.    Respiratory:  Positive for cough.      Objective:      Physical Exam   Constitutional: He is oriented to person, place, and time.   HENT:   Head: Normocephalic and atraumatic.   Ears:   Right Ear: Tympanic membrane, external ear and ear canal normal.   Left Ear: Tympanic membrane, external ear and ear canal normal.   Nose: Congestion present.   Mouth/Throat: Posterior oropharyngeal erythema present.   Eyes: Conjunctivae are normal. Extraocular movement intact   Neck: Neck supple.   Cardiovascular: Normal rate, regular rhythm, normal heart sounds and normal pulses.   Pulmonary/Chest: Effort normal and breath sounds normal.   Abdominal: Normal appearance. Soft.   Musculoskeletal: Normal range of motion.         General: Normal range of motion.   Neurological: He is alert and oriented to person, place, and time.   Skin: Skin is warm and dry. Capillary refill takes 2 to 3 seconds.   Psychiatric: His behavior is normal. Mood normal.   Nursing note and vitals reviewed.      Assessment:       1. Cough, unspecified type    2. COVID-19 virus detected      Covid antigen: Positive    Influenza A/B: Negative     Plan:       Influenza negative, Covid positive, VSS, afebrile. Symptoms present over 7 days and mild with non-productive cough.  He is vaccinated. He declined Paxlovid due to mild symptoms with duration over 1 week.  Cough, unspecified type  -     SARS Coronavirus 2 Antigen, POCT Manual Read  -     POCT Influenza " A/B    COVID-19 virus detected  -     albuterol (VENTOLIN HFA) 90 mcg/actuation inhaler; Inhale 1 puff into the lungs every 6 (six) hours as needed for Wheezing. Rescue  Dispense: 18 g; Refill: 0    Symptomatic treatment to include:    Rest, increase fluid intake to include electrolyte replacement  Ibuprofen/Tylenol as directed for fever, sore throat, body aches  Mucinex liquid as directed  Warm, salt water gargles, over the counter throat lozenges or sprays as desires.   ER for difficulty breathing not relieved by rest, excessive lethargy and/or change in mental status  Albuterol inhaler (if prescribed) for chest tightness, shortness or breath, wheezing, or tight/wheezing cough especially when brought on with deep breath.  Follow CDC isolation guidelines as provided  Patient Instructions   POSITIVE COVID TEST      You have tested positive for COVID-19 today.  Please note that patients who test positive for COVID-19 are required by the CDC to undergo isolation for 5 days, then wearing a mask around others for an additional 5 days, after their symptoms first began following the new updated guidelines of 12/27/2021. This isolation starts from the day you first developed symptoms, not the day of your positive test. For example, if your symptoms began on a Monday but tested positive on the following Wednesday, your 5-day isolation begins from that Monday, not the Wednesday you tested positive.  However, if you are asymptomatic (a person who does not have any symptoms) and COVID-19 positive, your 5-day isolation begins on the day you tested positive, regardless of exposure date.  Also, per the CDC guidelines, once your 5 days have passed, symptoms have resolved or are improving, and you have not had fever greater than 100.4F in the last 24 hours without taking any fever reducers such as Tylenol (Acetaminophen) or Motrin (Ibuprofen), you may return to your normal activities including social distancing, wearing masks, and  frequent handwashing - YOU DO NOT NEED ANOTHER TEST IN ORDER TO END YOUR QUARANTINE.

## 2023-01-02 NOTE — PATIENT INSTRUCTIONS
Symptomatic treatment to include:    Rest, increase fluid intake to include electrolyte replacement  Ibuprofen/Tylenol as directed for fever, sore throat, body aches  Mucinex liquid as directed  Warm, salt water gargles, over the counter throat lozenges or sprays as desires.   ER for difficulty breathing not relieved by rest, excessive lethargy and/or change in mental status  Albuterol inhaler (if prescribed) for chest tightness, shortness or breath, wheezing, or tight/wheezing cough especially when brought on with deep breath.  Follow CDC isolation guidelines as provided  Patient Instructions   POSITIVE COVID TEST      You have tested positive for COVID-19 today.  Please note that patients who test positive for COVID-19 are required by the CDC to undergo isolation for 5 days, then wearing a mask around others for an additional 5 days, after their symptoms first began following the new updated guidelines of 12/27/2021. This isolation starts from the day you first developed symptoms, not the day of your positive test. For example, if your symptoms began on a Monday but tested positive on the following Wednesday, your 5-day isolation begins from that Monday, not the Wednesday you tested positive.  However, if you are asymptomatic (a person who does not have any symptoms) and COVID-19 positive, your 5-day isolation begins on the day you tested positive, regardless of exposure date.  Also, per the CDC guidelines, once your 5 days have passed, symptoms have resolved or are improving, and you have not had fever greater than 100.4F in the last 24 hours without taking any fever reducers such as Tylenol (Acetaminophen) or Motrin (Ibuprofen), you may return to your normal activities including social distancing, wearing masks, and frequent handwashing - YOU DO NOT NEED ANOTHER TEST IN ORDER TO END YOUR QUARANTINE.

## 2023-02-03 ENCOUNTER — HOSPITAL ENCOUNTER (EMERGENCY)
Facility: HOSPITAL | Age: 78
Discharge: HOME | End: 2023-02-03
Attending: EMERGENCY MEDICINE
Payer: MEDICARE

## 2023-02-03 VITALS
HEIGHT: 67 IN | TEMPERATURE: 98 F | WEIGHT: 180 LBS | HEART RATE: 88 BPM | OXYGEN SATURATION: 96 % | BODY MASS INDEX: 28.25 KG/M2 | SYSTOLIC BLOOD PRESSURE: 127 MMHG | RESPIRATION RATE: 18 BRPM | DIASTOLIC BLOOD PRESSURE: 88 MMHG

## 2023-02-03 DIAGNOSIS — R53.1 WEAKNESS: ICD-10-CM

## 2023-02-03 DIAGNOSIS — R42 DIZZINESS: Primary | ICD-10-CM

## 2023-02-03 LAB
ALBUMIN SERPL BCP-MCNC: 4.2 G/DL (ref 3.5–5.2)
ALP SERPL-CCNC: 55 U/L (ref 55–135)
ALT SERPL W/O P-5'-P-CCNC: 35 U/L (ref 10–44)
ANION GAP SERPL CALC-SCNC: 13 MMOL/L (ref 8–16)
AST SERPL-CCNC: 28 U/L (ref 10–40)
BASOPHILS # BLD AUTO: 0.04 K/UL (ref 0–0.2)
BASOPHILS NFR BLD: 0.7 % (ref 0–1.9)
BILIRUB SERPL-MCNC: 1.2 MG/DL (ref 0.1–1)
BNP SERPL-MCNC: 20 PG/ML (ref 0–99)
BUN SERPL-MCNC: 12 MG/DL (ref 8–23)
CALCIUM SERPL-MCNC: 9.3 MG/DL (ref 8.7–10.5)
CHLORIDE SERPL-SCNC: 97 MMOL/L (ref 95–110)
CO2 SERPL-SCNC: 28 MMOL/L (ref 23–29)
CREAT SERPL-MCNC: 0.8 MG/DL (ref 0.5–1.4)
DIFFERENTIAL METHOD: ABNORMAL
EOSINOPHIL # BLD AUTO: 0 K/UL (ref 0–0.5)
EOSINOPHIL NFR BLD: 0.5 % (ref 0–8)
ERYTHROCYTE [DISTWIDTH] IN BLOOD BY AUTOMATED COUNT: 13.5 % (ref 11.5–14.5)
EST. GFR  (NO RACE VARIABLE): >60 ML/MIN/1.73 M^2
GLUCOSE SERPL-MCNC: 98 MG/DL (ref 70–110)
HCT VFR BLD AUTO: 49.9 % (ref 40–54)
HGB BLD-MCNC: 17.5 G/DL (ref 14–18)
IMM GRANULOCYTES # BLD AUTO: 0.02 K/UL (ref 0–0.04)
IMM GRANULOCYTES NFR BLD AUTO: 0.3 % (ref 0–0.5)
LYMPHOCYTES # BLD AUTO: 1.2 K/UL (ref 1–4.8)
LYMPHOCYTES NFR BLD: 19.5 % (ref 18–48)
MAGNESIUM SERPL-MCNC: 2.1 MG/DL (ref 1.6–2.6)
MCH RBC QN AUTO: 32.9 PG (ref 27–31)
MCHC RBC AUTO-ENTMCNC: 35.1 G/DL (ref 32–36)
MCV RBC AUTO: 94 FL (ref 82–98)
MONOCYTES # BLD AUTO: 0.6 K/UL (ref 0.3–1)
MONOCYTES NFR BLD: 9.2 % (ref 4–15)
NEUTROPHILS # BLD AUTO: 4.2 K/UL (ref 1.8–7.7)
NEUTROPHILS NFR BLD: 69.8 % (ref 38–73)
NRBC BLD-RTO: 0 /100 WBC
PLATELET # BLD AUTO: 200 K/UL (ref 150–450)
PMV BLD AUTO: 10.5 FL (ref 9.2–12.9)
POTASSIUM SERPL-SCNC: 3.5 MMOL/L (ref 3.5–5.1)
PROT SERPL-MCNC: 7.5 G/DL (ref 6–8.4)
RBC # BLD AUTO: 5.32 M/UL (ref 4.6–6.2)
SODIUM SERPL-SCNC: 138 MMOL/L (ref 136–145)
TROPONIN I SERPL HS-MCNC: 6.7 PG/ML (ref 0–14.9)
WBC # BLD AUTO: 5.95 K/UL (ref 3.9–12.7)

## 2023-02-03 PROCEDURE — 83880 ASSAY OF NATRIURETIC PEPTIDE: CPT | Performed by: EMERGENCY MEDICINE

## 2023-02-03 PROCEDURE — 83735 ASSAY OF MAGNESIUM: CPT | Performed by: EMERGENCY MEDICINE

## 2023-02-03 PROCEDURE — 80053 COMPREHEN METABOLIC PANEL: CPT | Performed by: EMERGENCY MEDICINE

## 2023-02-03 PROCEDURE — 99284 EMERGENCY DEPT VISIT MOD MDM: CPT | Mod: 25

## 2023-02-03 PROCEDURE — 85025 COMPLETE CBC W/AUTO DIFF WBC: CPT | Performed by: EMERGENCY MEDICINE

## 2023-02-03 PROCEDURE — 93005 ELECTROCARDIOGRAM TRACING: CPT | Performed by: SPECIALIST

## 2023-02-03 PROCEDURE — 93010 ELECTROCARDIOGRAM REPORT: CPT | Mod: ,,, | Performed by: SPECIALIST

## 2023-02-03 PROCEDURE — 84484 ASSAY OF TROPONIN QUANT: CPT | Performed by: EMERGENCY MEDICINE

## 2023-02-03 PROCEDURE — 93010 EKG 12-LEAD: ICD-10-PCS | Mod: ,,, | Performed by: SPECIALIST

## 2023-02-03 NOTE — ED PROVIDER NOTES
Encounter Date: 2/3/2023       History     Chief Complaint   Patient presents with    Dizziness     Pt states that his son past away last week and he has been feeling very lightheaded and is worried that he might be having a heart attack. Pt has no cardiac history but son  of MI     Patient presents complaining of lightheadedness.  Patient experienced recent loss of his son in the last 1 week and he has been feeling some lightheadedness.  No chest pain or shortness of breath.  Patient has a family history of heart disease.    Review of patient's allergies indicates:  No Known Allergies  Past Medical History:   Diagnosis Date    Colon polyps     Hearing loss     Hiatal hernia     Hypertension     Prostate disease     Squamous cell carcinoma of skin     Tuberculosis      Past Surgical History:   Procedure Laterality Date    eye lid surgey      HAND SURGERY      HAND SURGERY      HERNIA REPAIR      HERNIA REPAIR      NECK SURGERY      NECK SURGERY      PROSTATE SURGERY      PROSTATECTOMY      SHOULDER SURGERY      SKIN CANCER EXCISION      Skin Cancer removed from his left arm and left shoulder       Family History   Problem Relation Age of Onset    Prostate cancer Brother     Prostate cancer Brother      Social History     Tobacco Use    Smoking status: Never    Smokeless tobacco: Never   Substance Use Topics    Alcohol use: No     Alcohol/week: 0.0 standard drinks    Drug use: No     Review of Systems   All other systems reviewed and are negative.    Physical Exam     Initial Vitals [23 1041]   BP Pulse Resp Temp SpO2   (!) 154/91 110 20 98.1 °F (36.7 °C) 95 %      MAP       --         Physical Exam    Nursing note and vitals reviewed.  Constitutional: He appears well-developed and well-nourished. He is not diaphoretic. No distress.   HENT:   Head: Normocephalic and atraumatic.   Mouth/Throat: Oropharynx is clear and moist.   Eyes: EOM are normal.   Neck: Neck supple.   Normal range of  motion.  Cardiovascular:  Normal rate, regular rhythm, normal heart sounds and intact distal pulses.           Pulmonary/Chest: Breath sounds normal. No respiratory distress.   Abdominal: Abdomen is soft.   Musculoskeletal:         General: Normal range of motion.      Cervical back: Normal range of motion and neck supple.     Neurological: He is alert and oriented to person, place, and time. He has normal strength.   Vision-normal  Neglect-normal  Aphasia - normal  Pronator drift - normal  Cerebellum - normal   Skin: Skin is warm and dry.   Psychiatric: He has a normal mood and affect. His behavior is normal. Judgment and thought content normal.       ED Course   Procedures  Labs Reviewed   CBC W/ AUTO DIFFERENTIAL - Abnormal; Notable for the following components:       Result Value    MCH 32.9 (*)     All other components within normal limits   COMPREHENSIVE METABOLIC PANEL - Abnormal; Notable for the following components:    Total Bilirubin 1.2 (*)     All other components within normal limits   MAGNESIUM   TROPONIN I HIGH SENSITIVITY   B-TYPE NATRIURETIC PEPTIDE   TROPONIN I HIGH SENSITIVITY        ECG Results              EKG 12-lead (Weakness) Age > 50 (In process)  Result time 02/03/23 11:32:30      In process by Interface, Lab In [facility] (02/03/23 11:32:30)                   Narrative:    Test Reason : R53.1,    Vent. Rate : 092 BPM     Atrial Rate : 092 BPM     P-R Int : 204 ms          QRS Dur : 092 ms      QT Int : 366 ms       P-R-T Axes : 017 034 009 degrees     QTc Int : 452 ms    Normal sinus rhythm  Nonspecific ST abnormality  Abnormal ECG  When compared with ECG of 13-MAR-2020 11:42,  Inverted T waves have replaced nonspecific T wave abnormality in Inferior  leads    Referred By: [email or web address]   SELF           Confirmed By:                                   Imaging Results              X-Ray Chest 1 View (Final result)  Result time 02/03/23 12:17:57   Procedure changed from X-Ray Chest AP Portable      Final result by Harrison Aguero MD (02/03/23 12:17:57)                   Narrative:    Chest single view    CLINICAL DATA: Chest pain    FINDINGS: AP view of the chest is compared to August 2017.    Inspiratory effort is very shallow. Heart size is upper normal. Moderate-sized hiatal hernia is unchanged in appearance. There is atelectasis at both lung bases. The upper lung zones are clear. No effusions are identified.    IMPRESSION:  1. Limited examination secondary to shallow inspiratory effort.  2. Mild bibasilar atelectasis.  3. Hiatal hernia.    Electronically signed by:  Harrison Aguero MD  2/3/2023 12:17 PM CST Workstation: [id number]                                     Medications - No data to display  Medical Decision Making:   Initial Assessment:   No apparent distress  Differential Diagnosis:   Considerations include but are not limited to acute kidney injury, dehydration, electrolyte abnormalities, acute coronary syndrome, dysrhythmia  Independently Interpreted Test(s):   I have ordered and independently interpreted EKG Reading(s) - see summary below       <> Summary of EKG Reading(s): EKG is regular rate and rhythm without ST elevation  Clinical Tests:   Lab Tests: Reviewed and Ordered  Radiological Study: Ordered and Reviewed  Medical Tests: Reviewed and Ordered  ED Management:  [facility]    Patient presents for emergent evaluation of acute dizziness that poses a threat to life and/or bodily function.    In the ED patient found to have acute lightheadedness, grief.    I ordered labs and personally reviewed them.  Labs significant for negative troponin of 6.  I ordered X-rays and personally reviewed them and reviewed the radiologist interpretation.  Xray significant for no acute cardiopulmonary process.    I ordered EKG and personally reviewed it.  EKG significant for no ST elevation.        Discharge [facility]    Patient has no evidence of any acute cardiac condition.  Patient experiencing tremendous grief  from recent loss of son.  Patient was discharged in stable condition.  Detailed return precautions discussed.           ED Course as of 02/03/23 1424   Fri Feb 03, 2023   1308 Troponin I High Sensitivity: 6.7 [AP]   1308 Magnesium: 2.1 [AP]   1308 BNP: 20 [AP]   1308 WBC: 5.95 [AP]   1308 Hemoglobin: 17.5 [AP]   1308 Hematocrit: 49.9 [AP]   1308 Platelets: 200 [AP]   1308 Sodium: 138 [AP]   1308 Potassium: 3.5 [AP]   1308 Chloride: 97 [AP]   1308 CO2: 28 [AP]   1308 BUN: 12 [AP]   1308 Creatinine: 0.8 [AP]   1308 Calcium: 9.3 [AP]   1308 PROTEIN TOTAL: 7.5 [AP]   1308 Albumin: 4.2 [AP]   1308 Alkaline Phosphatase: 55 [AP]   1308 AST: 28 [AP]   1308 ALT: 35 [AP]   1308 Anion Gap: 13 [AP]      ED Course User Index  [AP] Arsh Guillen MD                 Clinical Impression:   Final diagnoses:  [R42] Dizziness (Primary)        ED Disposition Condition    Discharge Stable          ED Prescriptions    None       Follow-up Information       Follow up With Specialties Details Why Contact Info    Roel Tyson MD Family Medicine In 1 week  [address] [facility] [address]  [phone]               Arsh Guillen MD  02/03/23 1426

## 2023-02-10 ENCOUNTER — OFFICE VISIT (OUTPATIENT)
Dept: FAMILY MEDICINE | Facility: CLINIC | Age: 78
End: 2023-02-10
Payer: MEDICARE

## 2023-02-10 VITALS
OXYGEN SATURATION: 97 % | TEMPERATURE: 98 F | BODY MASS INDEX: 28.02 KG/M2 | SYSTOLIC BLOOD PRESSURE: 114 MMHG | DIASTOLIC BLOOD PRESSURE: 66 MMHG | HEART RATE: 70 BPM | HEIGHT: 67 IN | RESPIRATION RATE: 16 BRPM | WEIGHT: 178.56 LBS

## 2023-02-10 DIAGNOSIS — Z00.00 HEALTHCARE MAINTENANCE: Primary | ICD-10-CM

## 2023-02-10 DIAGNOSIS — G60.9 IDIOPATHIC PERIPHERAL NEUROPATHY: ICD-10-CM

## 2023-02-10 DIAGNOSIS — R79.9 ABNORMAL FINDING OF BLOOD CHEMISTRY, UNSPECIFIED: ICD-10-CM

## 2023-02-10 DIAGNOSIS — I10 ESSENTIAL HYPERTENSION: ICD-10-CM

## 2023-02-10 DIAGNOSIS — Z12.11 COLON CANCER SCREENING: ICD-10-CM

## 2023-02-10 DIAGNOSIS — M54.16 LUMBAR RADICULOPATHY: ICD-10-CM

## 2023-02-10 DIAGNOSIS — Z91.89 FRAMINGHAM CARDIAC RISK >20% IN NEXT 10 YEARS: ICD-10-CM

## 2023-02-10 DIAGNOSIS — I10 PRIMARY HYPERTENSION: ICD-10-CM

## 2023-02-10 DIAGNOSIS — R06.2 WHEEZING: ICD-10-CM

## 2023-02-10 DIAGNOSIS — E78.5 HYPERLIPIDEMIA, UNSPECIFIED HYPERLIPIDEMIA TYPE: ICD-10-CM

## 2023-02-10 DIAGNOSIS — R60.0 BILATERAL LEG EDEMA: ICD-10-CM

## 2023-02-10 DIAGNOSIS — R10.13 EPIGASTRIC PAIN: ICD-10-CM

## 2023-02-10 DIAGNOSIS — E78.2 MIXED HYPERLIPIDEMIA: ICD-10-CM

## 2023-02-10 DIAGNOSIS — Z00.00 ENCOUNTER FOR PREVENTIVE HEALTH EXAMINATION: ICD-10-CM

## 2023-02-10 DIAGNOSIS — B02.7 DISSEMINATED HERPES ZOSTER: ICD-10-CM

## 2023-02-10 PROCEDURE — 99999 PR PBB SHADOW E&M-EST. PATIENT-LVL IV: ICD-10-PCS | Mod: PBBFAC,,, | Performed by: STUDENT IN AN ORGANIZED HEALTH CARE EDUCATION/TRAINING PROGRAM

## 2023-02-10 PROCEDURE — 99214 OFFICE O/P EST MOD 30 MIN: CPT | Mod: S$PBB,,, | Performed by: STUDENT IN AN ORGANIZED HEALTH CARE EDUCATION/TRAINING PROGRAM

## 2023-02-10 PROCEDURE — 99999 PR PBB SHADOW E&M-EST. PATIENT-LVL IV: CPT | Mod: PBBFAC,,, | Performed by: STUDENT IN AN ORGANIZED HEALTH CARE EDUCATION/TRAINING PROGRAM

## 2023-02-10 PROCEDURE — 99214 OFFICE O/P EST MOD 30 MIN: CPT | Mod: PBBFAC,PO | Performed by: STUDENT IN AN ORGANIZED HEALTH CARE EDUCATION/TRAINING PROGRAM

## 2023-02-10 PROCEDURE — 99214 PR OFFICE/OUTPT VISIT, EST, LEVL IV, 30-39 MIN: ICD-10-PCS | Mod: S$PBB,,, | Performed by: STUDENT IN AN ORGANIZED HEALTH CARE EDUCATION/TRAINING PROGRAM

## 2023-02-10 RX ORDER — FLUTICASONE PROPIONATE 50 MCG
1 SPRAY, SUSPENSION (ML) NASAL DAILY
Qty: 18.2 G | Refills: 3 | Status: SHIPPED | OUTPATIENT
Start: 2023-02-10

## 2023-02-10 RX ORDER — AMITRIPTYLINE HYDROCHLORIDE 10 MG/1
10 TABLET, FILM COATED ORAL NIGHTLY PRN
Qty: 90 TABLET | Refills: 3 | Status: SHIPPED | OUTPATIENT
Start: 2023-02-10 | End: 2024-02-10

## 2023-02-10 RX ORDER — PANTOPRAZOLE SODIUM 40 MG/1
40 TABLET, DELAYED RELEASE ORAL DAILY
Qty: 90 TABLET | Refills: 3 | Status: SHIPPED | OUTPATIENT
Start: 2023-02-10

## 2023-02-10 RX ORDER — IPRATROPIUM BROMIDE 42 UG/1
2 SPRAY, METERED NASAL 4 TIMES DAILY
COMMUNITY
End: 2023-02-10 | Stop reason: SDUPTHER

## 2023-02-10 RX ORDER — AMLODIPINE BESYLATE 2.5 MG/1
2.5 TABLET ORAL DAILY
Qty: 90 TABLET | Refills: 3 | Status: SHIPPED | OUTPATIENT
Start: 2023-02-10 | End: 2023-12-15

## 2023-02-10 RX ORDER — MISOPROSTOL 100 UG/1
100 TABLET ORAL 2 TIMES DAILY WITH MEALS
Qty: 180 TABLET | Refills: 3 | Status: SHIPPED | OUTPATIENT
Start: 2023-02-10 | End: 2024-02-10

## 2023-02-10 RX ORDER — IPRATROPIUM BROMIDE 42 UG/1
2 SPRAY, METERED NASAL 4 TIMES DAILY
Qty: 15 ML | Status: SHIPPED | OUTPATIENT
Start: 2023-02-10 | End: 2023-02-10

## 2023-02-10 RX ORDER — GABAPENTIN 300 MG/1
CAPSULE ORAL
Qty: 270 CAPSULE | Refills: 3 | Status: SHIPPED | OUTPATIENT
Start: 2023-02-10 | End: 2024-03-01 | Stop reason: SDUPTHER

## 2023-02-10 RX ORDER — FLUTICASONE PROPIONATE 50 MCG
1 SPRAY, SUSPENSION (ML) NASAL DAILY
COMMUNITY
End: 2023-02-10 | Stop reason: SDUPTHER

## 2023-02-10 RX ORDER — PERPHENAZINE 16 MG
600 TABLET ORAL DAILY
Qty: 90 EACH | Refills: 1 | Status: SHIPPED | OUTPATIENT
Start: 2023-02-10 | End: 2023-02-15 | Stop reason: SDUPTHER

## 2023-02-10 RX ORDER — AMITRIPTYLINE HYDROCHLORIDE 10 MG/1
10 TABLET, FILM COATED ORAL NIGHTLY PRN
COMMUNITY
End: 2023-02-10 | Stop reason: SDUPTHER

## 2023-02-10 RX ORDER — AMLODIPINE BESYLATE 2.5 MG/1
2.5 TABLET ORAL DAILY
COMMUNITY
End: 2023-02-10 | Stop reason: SDUPTHER

## 2023-02-10 RX ORDER — ATORVASTATIN CALCIUM 40 MG/1
40 TABLET, FILM COATED ORAL DAILY
Qty: 90 TABLET | Refills: 3 | Status: SHIPPED | OUTPATIENT
Start: 2023-02-10 | End: 2023-12-15

## 2023-02-10 RX ORDER — IPRATROPIUM BROMIDE 42 UG/1
2 SPRAY, METERED NASAL 4 TIMES DAILY
Qty: 15 ML | Refills: 7 | Status: SHIPPED | OUTPATIENT
Start: 2023-02-10 | End: 2023-02-21

## 2023-02-10 RX ORDER — ALBUTEROL SULFATE 90 UG/1
1 AEROSOL, METERED RESPIRATORY (INHALATION) EVERY 6 HOURS PRN
Qty: 18 G | Refills: 1 | Status: SHIPPED | OUTPATIENT
Start: 2023-02-10 | End: 2023-02-15 | Stop reason: SDUPTHER

## 2023-02-10 RX ORDER — TRIAMTERENE/HYDROCHLOROTHIAZID 37.5-25 MG
1 TABLET ORAL DAILY
Qty: 90 TABLET | Refills: 3 | Status: SHIPPED | OUTPATIENT
Start: 2023-02-10 | End: 2024-02-09

## 2023-02-10 NOTE — PROGRESS NOTES
[facility] Note    Subjective:    The HPI and pertinent ROS is included in the Diagnostic Impression Remarks section at the end of the note. Please see below for further details. Chief complaint is at end of note.     Michael is a pleasant intelligent patient who is here for evaluation.     Modified Medications    Modified Medication Previous Medication    ALBUTEROL (VENTOLIN HFA) 90 MCG/ACTUATION INHALER albuterol (VENTOLIN HFA) 90 mcg/actuation inhaler       Inhale 1 puff into the lungs every 6 (six) hours as needed for Wheezing. Rescue    Inhale 1 puff into the lungs every 6 (six) hours as needed for Wheezing. Rescue    AMITRIPTYLINE (ELAVIL) 10 MG TABLET amitriptyline (ELAVIL) 10 MG tablet       Take 1 tablet (10 mg total) by mouth nightly as needed for Insomnia.    Take 10 mg by mouth nightly as needed for Insomnia.    AMLODIPINE (NORVASC) 2.5 MG TABLET amLODIPine (NORVASC) 2.5 MG tablet       Take 1 tablet (2.5 mg total) by mouth once daily.    Take 2.5 mg by mouth once daily.    ATORVASTATIN (LIPITOR) 40 MG TABLET atorvastatin (LIPITOR) 40 MG tablet       Take 1 tablet (40 mg total) by mouth once daily.    TAKE 1 TABLET BY MOUTH ONCE DAILY    FLUTICASONE PROPIONATE (FLONASE) 50 MCG/ACTUATION NASAL SPRAY fluticasone propionate (FLONASE) 50 mcg/actuation nasal spray       1 spray (50 mcg total) by Each Nostril route once daily.    1 spray by Each Nostril route once daily.    GABAPENTIN (NEURONTIN) 300 MG CAPSULE gabapentin (NEURONTIN) 300 MG capsule       TAKE 1 CAPSULE BY MOUTH 3  TIMES DAILY    TAKE 1 CAPSULE BY MOUTH 3  TIMES DAILY    IPRATROPIUM (ATROVENT) 42 MCG (0.06 %) NASAL SPRAY ipratropium (ATROVENT) 42 mcg (0.06 %) nasal spray       2 sprays by Each Nostril route 4 (four) times daily. for 11 days    2 sprays by Each Nostril route 4 (four) times daily.    MISOPROSTOL (CYTOTEC) 100 MCG TAB miSOPROStoL (CYTOTEC) 100 MCG Tab       Take 1 tablet (100 mcg total) by mouth 2 (two) times  daily with meals.    TAKE 1 TABLET BY MOUTH  TWICE DAILY WITH MEALS    PANTOPRAZOLE (PROTONIX) 40 MG TABLET pantoprazole (PROTONIX) 40 MG tablet       Take 1 tablet (40 mg total) by mouth once daily.    Take 1 tablet (40 mg total) by mouth once daily.    TRIAMTERENE-HYDROCHLOROTHIAZIDE 37.5-25 MG (MAXZIDE-25) 37.5-25 MG PER TABLET triamterene-hydrochlorothiazide 37.5-25 mg (MAXZIDE-25) 37.5-25 mg per tablet       Take 1 tablet by mouth once daily.    Take 1 tablet by mouth once daily.       Data reviewed 274}  Previous medical records reviewed and summarized in plan section at end of note.      If you are due for any health screening(s) below please notify me so we can arrange them to be ordered and scheduled. Most healthy patients at your age complete them, but you are free to accept or refuse. If you can't do it, I'll definitely understand. If you can, I'd certainly appreciate it!     All of your core healthy metrics are met.      The following portions of the patient's history were reviewed and updated as appropriate: allergies, current medications, past family history, past medical history, past social history, past surgical history and problem list.    He  has a past medical history of Colon polyps, Hearing loss, Hiatal hernia, Hypertension, Prostate disease, Squamous cell carcinoma of skin, and Tuberculosis.  He  has a past surgical history that includes Hernia repair; Neck surgery; Hand surgery; Prostatectomy; Neck surgery; Prostate surgery; Hernia repair; Skin cancer excision; Shoulder surgery; Hand surgery; eye lid surgey; and Skin Cancer removed from his left arm and left shoulder.    He  reports that he has never smoked. He has never been exposed to tobacco smoke. He has never used smokeless tobacco. He reports that he does not drink alcohol and does not use drugs.  He family history includes Prostate cancer in his brother and brother.    Review of patient's allergies indicates:  No Known  "Allergies    Tobacco Use: Low Risk     Smoking Tobacco Use: Never    Smokeless Tobacco Use: Never    Passive Exposure: Never     Physical Examination  General appearance: alert, cooperative, no distress  Neck: no thyromegaly, no neck stiffness  Lungs: clear to auscultation, no wheezes, rales or rhonchi, symmetric air entry  Heart: normal rate, regular rhythm, normal S1, S2, no murmurs, rubs, clicks or gallops  Abdomen: soft, nontender, nondistended, no rigidity, rebound, or guarding.   Back: no point tenderness over spine  Extremities: peripheral pulses normal, no unilateral leg swelling or calf tenderness   Neurological:alert, oriented, normal speech, no new focal findings or movement disorder noted from baseline    BP Readings from Last 3 Encounters:   02/10/23 114/66   02/03/23 127/88   01/02/23 133/85     Wt Readings from Last 3 Encounters:   02/10/23 81 kg (178 lb 9.2 oz)   02/03/23 81.6 kg (180 lb)   01/02/23 81.2 kg (179 lb)     /66 (BP Location: Right arm, Patient Position: Sitting, BP Method: Large (Manual))   Pulse 70   Temp 98.2 °F (36.8 °C) (Oral)   Resp 16   Ht 5' 7" (1.702 m)   Wt 81 kg (178 lb 9.2 oz)   SpO2 97%   BMI 27.97 kg/m²    274}  Laboratory: I have reviewed old labs below:    274}    Lab Results   Component Value Date    WBC 5.95 02/03/2023    HGB 17.5 02/03/2023    HCT 49.9 02/03/2023    MCV 94 02/03/2023     02/03/2023     02/03/2023    K 3.5 02/03/2023    CL 97 02/03/2023    CALCIUM 9.3 02/03/2023    CO2 28 02/03/2023    GLU 98 02/03/2023    BUN 12 02/03/2023    CREATININE 0.8 02/03/2023    ANIONGAP 13 02/03/2023    PROT 7.5 02/03/2023    ALBUMIN 4.2 02/03/2023    BILITOT 1.2 (H) 02/03/2023    ALKPHOS 55 02/03/2023    ALT 35 02/03/2023    AST 28 02/03/2023    CHOL 157 04/19/2022    TRIG 95 04/19/2022    HDL 37 (L) 04/19/2022    LDLCALC 101.0 04/19/2022    TSH 1.498 10/02/2017    PSA <0.01 08/09/2016     Lab reviewed by me: Particular labs of significance that I " "will monitor, workup, or treat to improve are mentioned below in diagnostic impression remarks.    The 10-year ASCVD risk score (Winnie SON, et al., 2019) is: 24.2%    Values used to calculate the score:      Age: 77 years      Sex: Male      Is Non- : No      Diabetic: No      Tobacco smoker: No      Systolic Blood Pressure: 114 mmHg      Is BP treated: Yes      HDL Cholesterol: 37 mg/dL      Total Cholesterol: 157 mg/dL    Imaging/EKG: I have reviewed the pertinent results and my findings are noted in remarks.  274}    CC:   Chief Complaint   Patient presents with    [facility] Care    Hypertension    Annual Exam    Anxiety        274}    Assessment/Plan  Michael Ann Jr. is a 77 y.o. male who presents to clinic with:  1. Healthcare maintenance    2. Mixed hyperlipidemia    3. Primary hypertension    4. Idiopathic peripheral neuropathy    5. Wheezing    6. Hyperlipidemia, unspecified hyperlipidemia type    7. Disseminated herpes zoster    8. Epigastric pain    9. Essential hypertension    10. Bilateral leg edema    11. Abnormal finding of blood chemistry, unspecified    12. Encounter for preventive health examination       274}  Diagnostic Impression Remarks + HPI     Documentation entered by me for this encounter may have been done in part using speech-recognition technology. Although I have made an effort to ensure accuracy, "sound like" errors may exist and should be interpreted in context.     Hypertension well controlled continue current meds  Hyperlipidemia control uncertain continue statin recheck levels   Neuropathy-patient has had a chronic neuropathy for years in the legs will recheck B12 folate B6 along with copper levels and heavy metals continue gabapentin will try alpha lipoic acid as well  Elevated [place] risk score-no chest pain or shortness of breath does have a family history of heart issues seen a cardiologist in awhile continue statin will have him establish " with Cardiology have a stress test years ago abnormal when to discuss with Cardiology  Lumbar radiculopathy-needs improvement will continue gabapentin amitriptyline will try alpha lipoic acid rec PT. He denies bladder or bowel incontinence, urinary retention, saddle anesthesia, and unilateral weakness. No weight loss, fever, chills, or illicit IV drug use. He denies chest pain and diaphoresis. He does not endorse a ripping or tearing sensation. No dysuria or flank pain.         At this point in time the patient is considered a low risk as determined by his history, physical, and the absence of red flags. I have a low suspicion of cord compression since he does not have saddle anesthesia, bowel or bladder incontinence,  weakness, or numbness in his arms or legs. Infection is low on my differential since he denies fever, chills, night sweats, IV drug use, dysuria, flank pain, and recent surgery. I did not appreciate bony tenderness, CVA tenderness, or an infectious source. My suspicion for cancer is low since he did not endorse fever, night sweats, or unintentional weight loss. Aortic dissection is low on the differential since he denies a ripping or tearing sensation chest pain, chest pain that radiates to the back, and sudden severe abdominal pain. On my exam there was no pulse deficit or pulsatile abdominal mass. Based on the history and examination, I feel that the likelihood of a high risk condition requiring emergent imaging is so low that no further testing in this regard is warranted at this point in time.     We discussed at length the warning symptoms in order for the patient to return to clinic and/or present to the ED if unable to reach the clinic within a timely manner including but not limited to: increased pain, change in gait, change in sensation around the rectum or perineum, bowel or bladder issues/incontinent, urinary retention, and fever. Via teach back mechanism, the patient voiced understanding  of the aforementioned recommendations/instructions.    This is the extent of this pleasant patient's concerns at this present time. He did not feel chest pain upon exertion, dyspnea, nausea, vomiting, diaphoresis, or syncope. No pleuritic chest pain, unilateral leg swelling, calf tenderness, or calf pain. Negative for unintentional weight loss night sweats and fevers. Michael will return to clinic in a few months for further workup and reassessment or sooner as needed. He was instructed to call the clinic or go to the emergency department or urgent care immediately if his symptoms do not improve, worsens, or if any new symptoms develop. As we discussed that symptoms could worsen over the next 24 hours he was advised that if any increased swelling, pain, or numbness arise to go immediately to the ED. Patient knows to call any time if an emergency arises. Shared decision making occurred and he verbalized understanding in agreement with this plan. I discussed imaging findings, diagnosis, possibilities, treatment options, medications, risks, and benefits. He had many questions regarding the options and long-term effects. All questions were answered. He expressed understanding after counseling regarding the diagnosis and recommendations. He was capable and demonstrated competence with understanding of these options. Shared decision making was performed resulting in him choosing the current treatment plan. Patient handout was given with instructions and recommendations. Advised the patient that if they become pregnant to alert us immediately to assess for medication changes. I also discussed the importance of close follow up to discuss labs, change or modify his medications if needed, monitor side effects, and further evaluation of medical problems.     Additional workup planned: see labs ordered below.    See below for labs and meds ordered with associated diagnosis      1. Healthcare maintenance    2. Mixed  hyperlipidemia    3. Primary hypertension    4. Idiopathic peripheral neuropathy  - Vitamin B12 Deficiency Panel; Future  - Folate; Future  - COPPER, SERUM; Future  - HEAVY METALS SCREEN, BLOOD (QUANTITATIVE); Future    5. Wheezing  - albuterol (VENTOLIN HFA) 90 mcg/actuation inhaler; Inhale 1 puff into the lungs every 6 (six) hours as needed for Wheezing. Rescue  Dispense: 18 g; Refill: 1    6. Hyperlipidemia, unspecified hyperlipidemia type  - atorvastatin (LIPITOR) 40 MG tablet; Take 1 tablet (40 mg total) by mouth once daily.  Dispense: 90 tablet; Refill: 3    7. Disseminated herpes zoster  - gabapentin (NEURONTIN) 300 MG capsule; TAKE 1 CAPSULE BY MOUTH 3  TIMES DAILY  Dispense: 270 capsule; Refill: 3    8. Epigastric pain  - miSOPROStoL (CYTOTEC) 100 MCG Tab; Take 1 tablet (100 mcg total) by mouth 2 (two) times daily with meals.  Dispense: 180 tablet; Refill: 3  - pantoprazole (PROTONIX) 40 MG tablet; Take 1 tablet (40 mg total) by mouth once daily.  Dispense: 90 tablet; Refill: 3    9. Essential hypertension  - triamterene-hydrochlorothiazide 37.5-25 mg (MAXZIDE-25) 37.5-25 mg per tablet; Take 1 tablet by mouth once daily.  Dispense: 90 tablet; Refill: 3    10. Bilateral leg edema  - triamterene-hydrochlorothiazide 37.5-25 mg (MAXZIDE-25) 37.5-25 mg per tablet; Take 1 tablet by mouth once daily.  Dispense: 90 tablet; Refill: 3    11. Abnormal finding of blood chemistry, unspecified  - Vitamin B12 Deficiency Panel; Future  - Folate; Future  - COPPER, SERUM; Future  - HEAVY METALS SCREEN, BLOOD (QUANTITATIVE); Future    12. Encounter for preventive health examination      Roel Tyson MD   274}    If you are due for any health screening(s) below please notify me so we can arrange them to be ordered and scheduled. Most healthy patients at your age complete them, but you are free to accept or refuse.     If you can't do it, I'll definitely understand. If you can, I'd certainly appreciate it!   All of your core  healthy metrics are met.

## 2023-02-15 DIAGNOSIS — R06.2 WHEEZING: ICD-10-CM

## 2023-02-15 DIAGNOSIS — G60.9 IDIOPATHIC PERIPHERAL NEUROPATHY: ICD-10-CM

## 2023-02-15 RX ORDER — ALBUTEROL SULFATE 90 UG/1
1 AEROSOL, METERED RESPIRATORY (INHALATION) EVERY 6 HOURS PRN
Qty: 18 G | Refills: 1 | Status: SHIPPED | OUTPATIENT
Start: 2023-02-15 | End: 2023-03-21

## 2023-02-15 RX ORDER — PERPHENAZINE 16 MG
200 TABLET ORAL 3 TIMES DAILY
Qty: 270 EACH | Refills: 1 | Status: SHIPPED | OUTPATIENT
Start: 2023-02-15 | End: 2024-02-15

## 2023-02-15 NOTE — TELEPHONE ENCOUNTER
And pharmacy sent a fax informing his insurance won't cover Ventolin HFA 90 MCG, they suggest proventil HFA 108MCG   Please advise

## 2023-02-15 NOTE — TELEPHONE ENCOUNTER
Care Due:                  Date            Visit Type   Department     Provider  --------------------------------------------------------------------------------                                EP -                              PRIMARY      [facility]  Last Visit: 02-      CARE ([facility])   ERNESTINA Tyson                              EP -                              PRIMARY      [facility]  Next Visit: 05-      CARE ([facility])   MEDICINE       Roel Tyson                                                            Last  Test          Frequency    Reason                     Performed    Due Date  --------------------------------------------------------------------------------    Lipid Panel.  12 months..  atorvastatin.............  04- 04-    [facility] Embedded Care Gaps. Reference number: [id number]. 2/15/2023   2:43:26 PM CST

## 2023-02-16 ENCOUNTER — LAB VISIT (OUTPATIENT)
Dept: LAB | Facility: HOSPITAL | Age: 78
End: 2023-02-16
Attending: STUDENT IN AN ORGANIZED HEALTH CARE EDUCATION/TRAINING PROGRAM
Payer: MEDICARE

## 2023-02-16 DIAGNOSIS — E78.5 HYPERLIPIDEMIA, UNSPECIFIED HYPERLIPIDEMIA TYPE: ICD-10-CM

## 2023-02-16 DIAGNOSIS — R79.9 ABNORMAL FINDING OF BLOOD CHEMISTRY, UNSPECIFIED: ICD-10-CM

## 2023-02-16 DIAGNOSIS — G60.9 IDIOPATHIC PERIPHERAL NEUROPATHY: ICD-10-CM

## 2023-02-16 PROCEDURE — 82746 ASSAY OF FOLIC ACID SERUM: CPT | Performed by: STUDENT IN AN ORGANIZED HEALTH CARE EDUCATION/TRAINING PROGRAM

## 2023-02-16 PROCEDURE — 80061 LIPID PANEL: CPT | Performed by: STUDENT IN AN ORGANIZED HEALTH CARE EDUCATION/TRAINING PROGRAM

## 2023-02-16 PROCEDURE — 83921 ORGANIC ACID SINGLE QUANT: CPT | Performed by: STUDENT IN AN ORGANIZED HEALTH CARE EDUCATION/TRAINING PROGRAM

## 2023-02-16 PROCEDURE — 36415 COLL VENOUS BLD VENIPUNCTURE: CPT | Mod: PO | Performed by: STUDENT IN AN ORGANIZED HEALTH CARE EDUCATION/TRAINING PROGRAM

## 2023-02-16 PROCEDURE — 84207 ASSAY OF VITAMIN B-6: CPT | Performed by: STUDENT IN AN ORGANIZED HEALTH CARE EDUCATION/TRAINING PROGRAM

## 2023-02-16 PROCEDURE — 82525 ASSAY OF COPPER: CPT | Performed by: STUDENT IN AN ORGANIZED HEALTH CARE EDUCATION/TRAINING PROGRAM

## 2023-02-17 LAB
CHOLEST SERPL-MCNC: 143 MG/DL (ref 120–199)
CHOLEST/HDLC SERPL: 3 {RATIO} (ref 2–5)
FOLATE SERPL-MCNC: 13 NG/ML (ref 4–24)
HDLC SERPL-MCNC: 47 MG/DL (ref 40–75)
HDLC SERPL: 32.9 % (ref 20–50)
LDLC SERPL CALC-MCNC: 78.4 MG/DL (ref 63–159)
NONHDLC SERPL-MCNC: 96 MG/DL
TRIGL SERPL-MCNC: 88 MG/DL (ref 30–150)

## 2023-02-18 LAB
ARSENIC BLD-MCNC: <1 NG/ML
CADMIUM BLD-MCNC: 0.5 NG/ML
CITY: NORMAL
COUNTY: NORMAL
GUARDIAN FIRST NAME: NORMAL
GUARDIAN LAST NAME: NORMAL
HOME PHONE: NORMAL
LEAD BLD-MCNC: 1.2 MCG/DL
MERCURY BLD-MCNC: 1 NG/ML
RACE: NORMAL
STATE: NORMAL
STREET ADDRESS: NORMAL
VENOUS/CAPILLARY: NORMAL
VIT B12 SERPL-MCNC: 330 NG/L (ref 180–914)
ZIP: NORMAL

## 2023-02-21 LAB
COPPER SERPL-MCNC: 823 UG/L (ref 665–1480)
METHYLMALONATE SERPL-SCNC: 0.16 NMOL/ML

## 2023-02-22 ENCOUNTER — TELEPHONE (OUTPATIENT)
Dept: FAMILY MEDICINE | Facility: CLINIC | Age: 78
End: 2023-02-22
Payer: MEDICARE

## 2023-02-22 LAB — PYRIDOXAL SERPL-MCNC: 11 UG/L (ref 5–50)

## 2023-02-22 NOTE — TELEPHONE ENCOUNTER
----- Message from Elyssa Velez sent at 2/22/2023 10:54 AM CST -----  Contact: wife  Type: Needs Medical Advice  Who Called:  jillian Paiz   Best Call Back Number: [phone]  Additional Information: patient would like to have a paper copy of blood work mailed to her. Please advise.

## 2023-02-27 NOTE — PATIENT INSTRUCTIONS
Wear splint for foot drop    If weakness in legs progresses let us know immediately.     Avoid  alcohol  Low-Salt Diet  This diet removes foods that are high in salt. It also limits the amount of salt you use when cooking. It is most often used for people with high blood pressure, edema (fluid retention), and kidney, liver, or heart disease.  Table salt contains the mineral sodium. Your body needs sodium to work normally. But too much sodium can make your health problems worse. Your healthcare provider is recommending a low-salt (also called low-sodium) diet for you. Your total daily allowance of salt is 1,500 to 2,300 milligrams (mg). It is less than 1 teaspoon of table salt. This means you can have only about 500 to 700 mg of sodium at each meal. People with certain health problems should limit salt intake to the lower end of the recommended range.    When you cook, don´t add much salt. If you can cook without using salt, even better. Don´t add salt to your food at the table.  When shopping, read food labels. Salt is often called sodium on the label. Choose foods that are salt-free, low salt, or very low salt. Note that foods with reduced salt may not lower your salt intake enough.    Beans, potatoes, and pasta  Ok: Dry beans, split peas, lentils, potatoes, rice, macaroni, pasta, spaghetti without added salt  Avoid: Potato chips, tortilla chips, and similar products  Breads and cereals  Ok: Low-sodium breads, rolls, cereals, and cakes; low-salt crackers, matzo crackers  Avoid: Salted crackers, pretzels, popcorn, [place] toast, pancakes, muffins  Dairy  Ok: Milk, chocolate milk, hot chocolate mix, low-salt cheeses, and yogurt  Avoid: Processed cheese and cheese spreads; [place]ef[place], Camembert, and cottage cheese; buttermilk, instant breakfast drink  Desserts  Ok: Ice cream, frozen yogurt, juice bars, gelatin, cookies and pies, sugar, honey, jelly, hard candy  Avoid: Most pies, cakes and cookies prepared or processed  Pediatric Surgery Staff       Doing well.  Afebrile  Packing removed.  From surgical standpoint ok to DC with po abx.  Contact info given for PRN follow up    Tomi Najera MD  Pediatric Surgery       [facility] - Pediatric Acute Care  Pediatric General Surgery  Progress Note    Patient Name: Stephanie Cook  MRN: [id number]  Admission Date: 2/25/2023  Hospital Length of Stay: 2 days  Attending Physician: Gris Arnold*  Primary Care Provider: Lizbeth Obrien DO    Subjective:     Interval History: NAEON. AF, VSS. Tenderness improved. Will plan for packing removal later this morning.     Post-Op Info:  Procedure(s) (LRB):  INCISION AND DRAINAGE, ABSCESS (Left)   2 Days Post-Op       Medications:  Continuous Infusions:  Scheduled Meds:   famotidine  20 mg Oral BID    levoFLOXacin  10 mg/kg/day Oral Daily    polyethylene glycol  17 g Oral Daily    vancomycin (VANCOCIN) IVPB  15 mg/kg Intravenous Q6H     PRN Meds:acetaminophen, ibuprofen, morphine, ondansetron     Review of patient's allergies indicates:   Allergen Reactions    Milk containing products Anaphylaxis    Sulfamethoxazole     Trimethoprim     Bactrim [sulfamethoxazole-trimethoprim] Rash    Omnicef [cefdinir] Rash       Objective:     Vital Signs (Most Recent):  Temp: 97.7 °F (36.5 °C) (02/27/23 0405)  Pulse: 81 (02/27/23 0405)  Resp: 20 (02/27/23 0405)  BP: (!) 102/55 (02/27/23 0405)  SpO2: 98 % (02/27/23 0405)   Vital Signs (24h Range):  Temp:  [97.2 °F (36.2 °C)-99.2 °F (37.3 °C)] 97.7 °F (36.5 °C)  Pulse:  [81-99] 81  Resp:  [20-22] 20  SpO2:  [97 %-100 %] 98 %  BP: ()/(52-75) 102/55       Intake/Output Summary (Last 24 hours) at 2/27/2023 0748  Last data filed at 2/27/2023 0342  Gross per 24 hour   Intake 2817 ml   Output 2850 ml   Net -33 ml       Physical Exam  Vitals and nursing note reviewed.   Constitutional:       General: She is not in acute distress.     Appearance: She is well-developed and normal weight. She is not diaphoretic.    HENT:      Mouth/Throat:      Pharynx: Oropharynx is clear. No oropharyngeal exudate.   Eyes:      General: No scleral icterus.     Extraocular Movements: Extraocular movements intact.   Cardiovascular:      Rate and Rhythm: Normal rate and regular rhythm.   Pulmonary:      Effort: Pulmonary effort is normal. No respiratory distress.   Abdominal:      Comments: Soft, ND, NT   Genitourinary:     Comments: Left medial groin incision with packing and dressing in place. Some ss drainage on the dressing. Induration improved.   Musculoskeletal:         General: No deformity. Normal range of motion.   Skin:     General: Skin is warm and dry.      Coloration: Skin is not jaundiced.   Neurological:      General: No focal deficit present.      Mental Status: She is alert.      Cranial Nerves: No cranial nerve deficit.   Psychiatric:         Mood and Affect: Mood normal.         Behavior: Behavior normal.       Significant Labs:  I have reviewed all pertinent lab results within the past 24 hours.    Significant Diagnostics:  I have reviewed all pertinent imaging results/findings within the past 24 hours.    Assessment/Plan:     Abscess of left groin  Stephanie Cook is a 18 y.o. female who presents as a transfer for evaluation of a left inguinal abscess in the setting of two weeks of headache, intermittent fevers and epigastric abdominal pain, and fatigue. She is s/p I&D of the groin abscess on 2/25/23. Progressing appropriately.    - We will remove packing this morning, she can apply overlying gauze and tape as needed  - In OR was noted to have enlarged LNs right next to abscess. Likely has lymphadenitis  - Agree with abx  - Diet as tolerated  - PRN pain meds  - Ok for discharge from a surgical perspective  - Remainder of care per primary        Merlin Ames MD  Pediatric General Surgery  [facility]   with salt; instant pudding  Drinks  Ok: Tea, coffee, fizzy (carbonated) drinks, juices  Avoid: Flavored coffees, electrolyte replacement drinks, sports drinks  Meats  Ok: All fresh meat, fish, poultry, low-salt tuna, eggs, egg substitute  Avoid: Smoked, pickled, brine-cured, or salted meats and fish. This includes barraza, chipped beef, corned beef, hot dogs, deli meats, ham, kosher meats, salt pork, sausage, canned tuna, salted codfish, smoked salmon, herring, sardines, or anchovies.  Seasonings and spices  Ok: Most seasonings are okay. Good substitutes for salt include: fresh herb blends, hot sauce, lemon, garlic, barton, vinegar, dry mustard, parsley, cilantro, horseradish, tomato paste, regular margarine, mayonnaise, unsalted butter, cream cheese, vegetable oil, cream, low-salt salad dressing and gravy.  Avoid: Regular ketchup, relishes, pickles, soy sauce, teriyaki sauce, Worcestershire sauce, BBQ sauce, tartar sauce, meat tenderizer, chili sauce, regular gravy, regular salad dressing, salted butter  Soups  Ok: Low-salt soups and broths made with allowed foods  Avoid: Bouillon cubes, soups with smoked or salted meats, regular soup and broth  Vegetables  Ok: Most vegetables are okay; also low-salt tomato and vegetable juices  Avoid: Sauerkraut and other brine-soaked vegetables; pickles and other pickled vegetables; tomato juice, olives  © 7713-7200 [employer]. [address] [place] [address], [place] [address]. All rights reserved. This information is not intended as a substitute for professional medical care. Always follow your healthcare professional's instructions.      Thank you for choosing [facility].     Please fill out the patient experience survey.

## 2023-03-07 LAB — NONINV COLON CA DNA+OCC BLD SCRN STL QL: NEGATIVE

## 2023-03-21 RX ORDER — ALBUTEROL SULFATE 90 UG/1
2 AEROSOL, METERED RESPIRATORY (INHALATION) EVERY 6 HOURS PRN
Qty: 90 G | Refills: 3 | Status: SHIPPED | OUTPATIENT
Start: 2023-03-21 | End: 2024-03-20

## 2023-03-21 NOTE — TELEPHONE ENCOUNTER
No new care gaps identified.  [facility] Embedded Care Gaps. Reference number: [id number]. 3/21/2023   9:38:40 AM ANNT

## 2023-03-21 NOTE — TELEPHONE ENCOUNTER
Pt insurance does not cover the VENTOLIN HFA AER 90 MCG BASE. Pharmacy is requesting if we can send in alternative PROVENTIL  MCG/ACT AERSOL SOL

## 2023-04-11 ENCOUNTER — OFFICE VISIT (OUTPATIENT)
Dept: CARDIOLOGY | Facility: CLINIC | Age: 78
End: 2023-04-11
Payer: MEDICARE

## 2023-04-11 VITALS
BODY MASS INDEX: 27.94 KG/M2 | HEIGHT: 67 IN | RESPIRATION RATE: 16 BRPM | WEIGHT: 178 LBS | HEART RATE: 77 BPM | DIASTOLIC BLOOD PRESSURE: 76 MMHG | SYSTOLIC BLOOD PRESSURE: 118 MMHG | OXYGEN SATURATION: 96 %

## 2023-04-11 DIAGNOSIS — I10 PRIMARY HYPERTENSION: ICD-10-CM

## 2023-04-11 DIAGNOSIS — G60.9 IDIOPATHIC PERIPHERAL NEUROPATHY: ICD-10-CM

## 2023-04-11 DIAGNOSIS — M21.372 BILATERAL FOOT-DROP: ICD-10-CM

## 2023-04-11 DIAGNOSIS — Z82.49 FAMILY HISTORY OF ASCVD: ICD-10-CM

## 2023-04-11 DIAGNOSIS — E78.00 PURE HYPERCHOLESTEROLEMIA: ICD-10-CM

## 2023-04-11 DIAGNOSIS — Z91.89 FRAMINGHAM CARDIAC RISK >20% IN NEXT 10 YEARS: ICD-10-CM

## 2023-04-11 DIAGNOSIS — R94.31 NONSPECIFIC ABNORMAL ELECTROCARDIOGRAM (ECG) (EKG): ICD-10-CM

## 2023-04-11 DIAGNOSIS — M21.371 BILATERAL FOOT-DROP: ICD-10-CM

## 2023-04-11 PROCEDURE — 93010 ELECTROCARDIOGRAM REPORT: CPT | Mod: S$PBB,,, | Performed by: INTERNAL MEDICINE

## 2023-04-11 PROCEDURE — 99999 PR PBB SHADOW E&M-EST. PATIENT-LVL IV: ICD-10-PCS | Mod: PBBFAC,,, | Performed by: INTERNAL MEDICINE

## 2023-04-11 PROCEDURE — 93010 EKG 12-LEAD: ICD-10-PCS | Mod: S$PBB,,, | Performed by: INTERNAL MEDICINE

## 2023-04-11 PROCEDURE — 99214 OFFICE O/P EST MOD 30 MIN: CPT | Mod: PBBFAC,PN | Performed by: INTERNAL MEDICINE

## 2023-04-11 PROCEDURE — 99999 PR PBB SHADOW E&M-EST. PATIENT-LVL IV: CPT | Mod: PBBFAC,,, | Performed by: INTERNAL MEDICINE

## 2023-04-11 PROCEDURE — 93005 ELECTROCARDIOGRAM TRACING: CPT | Mod: PBBFAC,PN | Performed by: INTERNAL MEDICINE

## 2023-04-11 PROCEDURE — 99204 PR OFFICE/OUTPT VISIT, NEW, LEVL IV, 45-59 MIN: ICD-10-PCS | Mod: S$PBB,,, | Performed by: INTERNAL MEDICINE

## 2023-04-11 PROCEDURE — 99204 OFFICE O/P NEW MOD 45 MIN: CPT | Mod: S$PBB,,, | Performed by: INTERNAL MEDICINE

## 2023-04-11 NOTE — ASSESSMENT & PLAN NOTE
Will proceed with noninvasive testing to include Lexiscan Myoview to evaluate for coronary insufficiency   Also obtain echocardiogram with longstanding hypertension for LV function assessment chamber size morphology as well.

## 2023-04-11 NOTE — PROGRESS NOTES
Subjective:    Patient ID:  Michael Ann Jr. is a 78 y.o. male patient here for evaluation Establish Care (Previous cardio Dr. Santoro, was seen in the ED Jan 2023-- his son passed away suddenly )      History of Present Illness:  Patient is a 78-year-old gentleman with history of severe neuropathic symptoms is seeking to establish cardiovascular care.  Previously he has seen Dr. Santoro     And apparently he has been lost to follow-up since then.  He is now referred to us by primary care physician to have cardiac evaluation patient has been experience some fatigue and tiredness some shortness of breath with effort noted.  Denies having chest pain.  His balance is bad and he uses walker and his activity has been markedly limited due to altered gait as well as bilateral footdrop with severe neuropathic symptoms.    Denies having any palpitations dizziness lightheadedness or weakness and no syncopal episodes.  Denies cough or congestion no fevers chills  Denies history of tobacco usage.  Family history is noncontributory at this time.          Review of patient's allergies indicates:  No Known Allergies    Past Medical History:   Diagnosis Date    Colon polyps     Hearing loss     Hiatal hernia     Hypertension     Prostate disease     Squamous cell carcinoma of skin     Tuberculosis      Past Surgical History:   Procedure Laterality Date    eye lid surgey      HAND SURGERY      HAND SURGERY      HERNIA REPAIR      HERNIA REPAIR      NECK SURGERY      NECK SURGERY      PROSTATE SURGERY      PROSTATECTOMY      SHOULDER SURGERY      SKIN CANCER EXCISION      Skin Cancer removed from his left arm and left shoulder       Social History     Tobacco Use    Smoking status: Never     Passive exposure: Never    Smokeless tobacco: Never   Substance Use Topics    Alcohol use: No     Alcohol/week: 0.0 standard drinks    Drug use: No        Review of Systems   As noted in HPI in addition     Constitutional: Negative for  chills, fatigue and fever.   Eyes: No double vision, No blurred vision  Neuro: No headaches, No dizziness  Respiratory: Negative for cough, shortness of breath and wheezing.    Cardiovascular: Negative for chest pain. Negative for palpitations and leg swelling.   Gastrointestinal: Negative for abdominal pain, No melena, diarrhea, nausea and vomiting.   Genitourinary: Negative for dysuria and frequency, Negative for hematuria  Skin: Negative for bruising, Negative for edema or discoloration noted.   Endocrine: Negative for polyphagia, Negative for heat intolerance, Negative for cold intolerance  Psychiatric: Negative for depression, Negative for anxiety, Negative for memory loss  Musculoskeletal:  Severe musculoskeletal weakness and neuropathy associated with altered gait and bilateral foot drops noted       Objective        Vitals:    04/11/23 1112   BP: 118/76   Pulse: 77   Resp: 16       LIPIDS - LAST 2   Lab Results   Component Value Date    CHOL 143 02/16/2023    CHOL 157 04/19/2022    HDL 47 02/16/2023    HDL 37 (L) 04/19/2022    LDLCALC 78.4 02/16/2023    LDLCALC 101.0 04/19/2022    TRIG 88 02/16/2023    TRIG 95 04/19/2022    CHOLHDL 32.9 02/16/2023    CHOLHDL 23.6 04/19/2022       CBC - LAST 2  Lab Results   Component Value Date    WBC 5.95 02/03/2023    WBC 6.54 04/19/2022    RBC 5.32 02/03/2023    RBC 4.58 (L) 04/19/2022    HGB 17.5 02/03/2023    HGB 15.4 04/19/2022    HCT 49.9 02/03/2023    HCT 45.3 04/19/2022    MCV 94 02/03/2023    MCV 99 (H) 04/19/2022    MCH 32.9 (H) 02/03/2023    MCH 33.6 (H) 04/19/2022    MCHC 35.1 02/03/2023    MCHC 34.0 04/19/2022    RDW 13.5 02/03/2023    RDW 12.4 04/19/2022     02/03/2023     04/19/2022    MPV 10.5 02/03/2023    MPV 10.5 04/19/2022    GRAN 4.2 02/03/2023    GRAN 69.8 02/03/2023    LYMPH 1.2 02/03/2023    LYMPH 19.5 02/03/2023    MONO 0.6 02/03/2023    MONO 9.2 02/03/2023    BASO 0.04 02/03/2023    BASO 0.06 04/19/2022    NRBC 0 02/03/2023    NRBC 0  04/19/2022       CHEMISTRY & LIVER FUNCTION - LAST 2  Lab Results   Component Value Date     02/03/2023     04/19/2022    K 3.5 02/03/2023    K 3.4 (L) 04/19/2022    CL 97 02/03/2023     04/19/2022    CO2 28 02/03/2023    CO2 30 (H) 04/19/2022    ANIONGAP 13 02/03/2023    ANIONGAP 9 04/19/2022    BUN 12 02/03/2023    BUN 20 04/19/2022    CREATININE 0.8 02/03/2023    CREATININE 0.8 04/19/2022    GLU 98 02/03/2023    GLU 91 04/19/2022    CALCIUM 9.3 02/03/2023    CALCIUM 9.1 04/19/2022    MG 2.1 02/03/2023    ALBUMIN 4.2 02/03/2023    ALBUMIN 3.5 04/19/2022    PROT 7.5 02/03/2023    PROT 6.3 04/19/2022    ALKPHOS 55 02/03/2023    ALKPHOS 57 04/19/2022    ALT 35 02/03/2023    ALT 27 04/19/2022    AST 28 02/03/2023    AST 25 04/19/2022    BILITOT 1.2 (H) 02/03/2023    BILITOT 1.2 (H) 04/19/2022        CARDIAC PROFILE - LAST 2  Lab Results   Component Value Date    BNP 20 02/03/2023    BNP 23 08/15/2017     (H) 01/15/2021     (H) 01/12/2021    TROPONINIHS 6.7 02/03/2023        COAGULATION - LAST 2  No results found for: LABPT, INR, APTT    ENDOCRINE & PSA - LAST 2  Lab Results   Component Value Date    TSH 1.498 10/02/2017    PSA <0.01 08/09/2016        ECHOCARDIOGRAM RESULTS  Results for orders placed in visit on 01/02/18    2D Echo w/ Color Flow Doppler    Narrative  Date of Procedure: 01/02/2018        TEST DESCRIPTION      Aorta: The aortic root is normal in size, measuring 3.0 cm at sinotubular junction and 3.2 cm at Sinuses of Valsalva. The proximal ascending aorta is normal in size, measuring 3.3 cm across.    Left Atrium: The left atrial volume index is normal, measuring 14.95 cc/m2.    Left Ventricle: The left ventricle is normal in size, with an end-diastolic diameter of 3.8 cm, and an end-systolic diameter of 2.6 cm. LV wall thickness is normal, with the septum measuring 0.9 cm and the posterior wall measuring 1.0 cm across. Relative  wall thickness was increased at 0.53, and  the LV mass index was 63.5 g/m2 consistent with concentric remodeling. There are no regional wall motion abnormalities. Left ventricular systolic function appears normal. Visually estimated ejection fraction is  55-60%. The LV Doppler derived stroke volume equals 86.0 ccs.    Diastolic indices: E wave velocity 0.7 m/s, E/A ratio 0.9,  msec., E/e' ratio(avg) 9. Diastolic function is normal.    Right Atrium: The right atrium is normal in size, measuring 2.7 cm in length and 2.1 cm in width in the apical view.    Right Ventricle: The right ventricle is normal in size measuring 1.6 cm at the base in the apical right ventricle-focused view. Global right ventricular systolic function was not well seen. Tricuspid annular plane systolic excursion (TAPSE) is 1.9 cm.  Tissue Doppler-derived tricuspid annular peak systolic velocity (S prime) is .1 cm/s. The estimated PA systolic pressure is greater than 23 mmHg.    Aortic Valve:  The aortic valve is mildly sclerotic. The aortic valve is tri-leaflet in structure.    Mitral Valve:  There is trivial mitral regurgitation.    Tricuspid Valve:  There is mild tricuspid regurgitation.    Pulmonary Valve:  The pulmonic valve is not well seen.    Intracavitary: There is no evidence of pericardial effusion, intracavity mass, thrombi, or vegetation.        CONCLUSIONS  1 - No wall motion abnormalities.  2 - Normal left ventricular systolic function (EF 55-60%).  3 - The estimated PA systolic pressure is greater than 23 mmHg.  4 - Trivial mitral regurgitation.  5 - Mild tricuspid regurgitation.  6 - Intertatrial septum is hypermobile.            This document has been electronically  SIGNED BY: Ochoa Santoro MD On: 01/05/2018 11:00      CURRENT/PREVIOUS VISIT EKG  Results for orders placed or performed during the hospital encounter of 02/03/23   EKG 12-lead (Weakness) Age > 50    Collection Time: 02/03/23 11:13 AM    Narrative    Test Reason : R53.1,    Vent. Rate : 092 BPM      Atrial Rate : 092 BPM     P-R Int : 204 ms          QRS Dur : 092 ms      QT Int : 366 ms       P-R-T Axes : 017 034 009 degrees     QTc Int : 452 ms    Normal sinus rhythm  Nonspecific ST abnormality  Abnormal ECG  When compared with ECG of 13-MAR-2020 11:42,  Inverted T waves have replaced nonspecific T wave abnormality in Inferior  leads  Confirmed by Gerardo Kingsley MD ([id number]) on 2/14/2023 11:08:11 AM    Referred By: NATHANIEL   SELF           Confirmed By:Gerardo Kingsley MD     No valid procedures specified.   No results found for this or any previous visit.    No valid procedures specified.  04/11/2023 EKG shows sinus rhythm with sinus arrhythmia nonspecific ST T wave changes noted        PREVIOUS STRESS TEST              PREVIOUS ANGIOGRAM        PHYSICAL EXAM    GENERAL: well built, well nourished, well-developed in no apparent distress alert and oriented.   HEENT: Normocephalic. Pupils normal and conjunctivae normal.  Mucous membranes normal, no cyanosis or icterus, trachea central,no pallor or icterus is noted..   NECK: No JVD. No bruit..   THYROID: Thyroid not enlarged. No nodules present..   CARDIAC: Regular rate and rhythm. S1 is normal.S2 is normal.No gallops, clicks or murmurs noted at this time.  CHEST ANATOMY: normal.   LUNGS: Clear to auscultation. No wheezing or rhonchi..   ABDOMEN: Soft no masses or organomegaly.  No abdomen pulsations or bruits.  Normal bowel sounds. No pulsations and no masses felt, No guarding or rebound.   EXTREMITIES: No cyanosis, clubbing or edema noted at this time., no calf tenderness bilaterally.   PERIPHERAL VASCULAR SYSTEM: Good palpable distal pulses.   CENTRAL NERVOUS SYSTEM:  A detailed neurologic exam was not performed.    SKIN: Skin without lesions, moist, well perfused.   MUSCLE STRENGTH & TONE:  Upper body muscle strength is normal.  Lower motor weakness is present.       I HAVE REVIEWED :    The vital signs, nurses notes, and all the pertinent radiology and  labs.        Current Outpatient Medications   Medication Instructions    albuterol (PROVENTIL HFA) 90 mcg/actuation inhaler 2 puffs, Inhalation, Every 6 hours PRN, Rescue    alpha lipoic acid 200 mg, Oral, 3 times daily    amitriptyline (ELAVIL) 10 mg, Oral, Nightly PRN    amLODIPine (NORVASC) 2.5 mg, Oral, Daily    atorvastatin (LIPITOR) 40 mg, Oral, Daily    fluticasone propionate (FLONASE) 50 mcg, Each Nostril, Daily    gabapentin (NEURONTIN) 300 MG capsule TAKE 1 CAPSULE BY MOUTH 3  TIMES DAILY    miSOPROStoL (CYTOTEC) 100 mcg, Oral, 2 times daily with meals    multivit-min/FA/lycopen/lutein (CENTRUM SILVER MEN ORAL) Oral    pantoprazole (PROTONIX) 40 mg, Oral, Daily    triamterene-hydrochlorothiazide 37.5-25 mg (MAXZIDE-25) 37.5-25 mg per tablet 1 tablet, Oral, Daily          Assessment & Plan     Hypertension  Blood pressure is fairly well controlled on the current therapy pressure today is 118/76 mm Hg encouraged him to continue on Maxzide 37.5/25 mg daily and amlodipine 2.5 mg a day maintain on low-salt diet    Hyperlipidemia  Continue on low-fat low-cholesterol diet maintain on Lipitor 40 mg daily    Family history of ASCVD  Although this is noncontributory this time is still has risk factors for CAD will encourage him to proceed with cardiac workup.    Idiopathic peripheral neuropathy  Severe idiopathic peripheral neuropathy is noted.  This limiting his activity and mobility.    Bilateral foot-drop  This is resulting in altered gait and unstable gait and uses a walker for stability.    Nonspecific abnormal electrocardiogram (ECG) (EKG)  Will proceed with noninvasive testing to include Lexiscan Myoview to evaluate for coronary insufficiency   Also obtain echocardiogram with longstanding hypertension for LV function assessment chamber size morphology as well.          Follow up in about 6 weeks (around 5/23/2023).

## 2023-04-11 NOTE — ASSESSMENT & PLAN NOTE
Although this is noncontributory this time is still has risk factors for CAD will encourage him to proceed with cardiac workup.

## 2023-04-11 NOTE — ASSESSMENT & PLAN NOTE
Blood pressure is fairly well controlled on the current therapy pressure today is 118/76 mm Hg encouraged him to continue on Maxzide 37.5/25 mg daily and amlodipine 2.5 mg a day maintain on low-salt diet

## 2023-05-15 ENCOUNTER — TELEPHONE (OUTPATIENT)
Dept: CARDIOLOGY | Facility: HOSPITAL | Age: 78
End: 2023-05-15

## 2023-05-16 ENCOUNTER — HOSPITAL ENCOUNTER (OUTPATIENT)
Dept: CARDIOLOGY | Facility: HOSPITAL | Age: 78
Discharge: HOME | End: 2023-05-16
Attending: INTERNAL MEDICINE
Payer: MEDICARE

## 2023-05-16 ENCOUNTER — HOSPITAL ENCOUNTER (OUTPATIENT)
Dept: RADIOLOGY | Facility: HOSPITAL | Age: 78
Discharge: HOME | End: 2023-05-16
Attending: INTERNAL MEDICINE
Payer: MEDICARE

## 2023-05-16 VITALS — WEIGHT: 178 LBS | HEIGHT: 67 IN | BODY MASS INDEX: 27.94 KG/M2

## 2023-05-16 DIAGNOSIS — Z91.89 FRAMINGHAM CARDIAC RISK >20% IN NEXT 10 YEARS: ICD-10-CM

## 2023-05-16 DIAGNOSIS — R94.31 NONSPECIFIC ABNORMAL ELECTROCARDIOGRAM (ECG) (EKG): ICD-10-CM

## 2023-05-16 DIAGNOSIS — E78.00 PURE HYPERCHOLESTEROLEMIA: ICD-10-CM

## 2023-05-16 LAB
CV PHARM DOSE: 0.4 MG
CV STRESS BASE HR: 65 BPM
DIASTOLIC BLOOD PRESSURE: 82 MMHG
EJECTION FRACTION- HIGH: 65 %
END DIASTOLIC INDEX-HIGH: 153 ML/M2
END DIASTOLIC INDEX-LOW: 93 ML/M2
END SYSTOLIC INDEX-HIGH: 71 ML/M2
END SYSTOLIC INDEX-LOW: 31 ML/M2
NUC REST DIASTOLIC VOLUME INDEX: 80
NUC REST EJECTION FRACTION: 69
NUC REST SYSTOLIC VOLUME INDEX: 25
NUC STRESS DIASTOLIC VOLUME INDEX: 70
NUC STRESS EJECTION FRACTION: 73 %
NUC STRESS SYSTOLIC VOLUME INDEX: 19
OHS CV CPX 1 MINUTE RECOVERY HEART RATE: 107 BPM
OHS CV CPX 85 PERCENT MAX PREDICTED HEART RATE MALE: 122
OHS CV CPX MAX PREDICTED HEART RATE: 144
OHS CV CPX PATIENT IS FEMALE: 0
OHS CV CPX PATIENT IS MALE: 1
OHS CV CPX PEAK DIASTOLIC BLOOD PRESSURE: 82 MMHG
OHS CV CPX PEAK HEAR RATE: 107 BPM
OHS CV CPX PEAK RATE PRESSURE PRODUCT: NORMAL
OHS CV CPX PEAK SYSTOLIC BLOOD PRESSURE: 160 MMHG
OHS CV CPX PERCENT MAX PREDICTED HEART RATE ACHIEVED: 74
OHS CV CPX RATE PRESSURE PRODUCT PRESENTING: 8970
RETIRED EF AND QEF - SEE NOTES: 53 %
SYSTOLIC BLOOD PRESSURE: 138 MMHG

## 2023-05-16 PROCEDURE — 78452 NUCLEAR STRESS - CARDIOLOGY INTERPRETED (CUPID ONLY): ICD-10-PCS | Mod: 26,,, | Performed by: INTERNAL MEDICINE

## 2023-05-16 PROCEDURE — 93016 CV STRESS TEST SUPVJ ONLY: CPT | Mod: ,,,

## 2023-05-16 PROCEDURE — 93306 TTE W/DOPPLER COMPLETE: CPT | Mod: 26,,, | Performed by: INTERNAL MEDICINE

## 2023-05-16 PROCEDURE — A9502 TC99M TETROFOSMIN: HCPCS

## 2023-05-16 PROCEDURE — 78452 HT MUSCLE IMAGE SPECT MULT: CPT | Mod: 26,,, | Performed by: INTERNAL MEDICINE

## 2023-05-16 PROCEDURE — 93306 ECHO (CUPID ONLY): ICD-10-PCS | Mod: 26,,, | Performed by: INTERNAL MEDICINE

## 2023-05-16 PROCEDURE — 93016 NUCLEAR STRESS - CARDIOLOGY INTERPRETED (CUPID ONLY): ICD-10-PCS | Mod: ,,,

## 2023-05-16 PROCEDURE — 93306 TTE W/DOPPLER COMPLETE: CPT

## 2023-05-16 PROCEDURE — 93018 NUCLEAR STRESS - CARDIOLOGY INTERPRETED (CUPID ONLY): ICD-10-PCS | Mod: ,,, | Performed by: INTERNAL MEDICINE

## 2023-05-16 PROCEDURE — 93018 CV STRESS TEST I&R ONLY: CPT | Mod: ,,, | Performed by: INTERNAL MEDICINE

## 2023-05-16 RX ORDER — REGADENOSON 0.08 MG/ML
0.4 INJECTION, SOLUTION INTRAVENOUS ONCE
Status: COMPLETED | OUTPATIENT
Start: 2023-05-16 | End: 2023-05-16

## 2023-05-16 RX ADMIN — REGADENOSON 0.4 MG: 0.08 INJECTION, SOLUTION INTRAVENOUS at 08:05

## 2023-05-18 LAB
AORTIC ROOT ANNULUS: 3.2 CM
AORTIC VALVE CUSP SEPERATION: 1.8 CM
AV INDEX (PROSTH): 1.09
AV MEAN GRADIENT: 2 MMHG
AV PEAK GRADIENT: 4 MMHG
AV VALVE AREA: 3.43 CM2
AV VELOCITY RATIO: 0.72
BSA FOR ECHO PROCEDURE: 1.95 M2
CV ECHO LV RWT: 0.56 CM
DOP CALC AO PEAK VEL: 1.06 M/S
DOP CALC AO VTI: 22.7 CM
DOP CALC LVOT AREA: 3.1 CM2
DOP CALC LVOT DIAMETER: 2 CM
DOP CALC LVOT PEAK VEL: 0.76 M/S
DOP CALC LVOT STROKE VOLUME: 77.87 CM3
DOP CALCLVOT PEAK VEL VTI: 24.8 CM
E WAVE DECELERATION TIME: 190 MSEC
E/A RATIO: 0.79
E/E' RATIO: 6.57 M/S
ECHO LV POSTERIOR WALL: 1.05 CM (ref 0.6–1.1)
EJECTION FRACTION: 65 %
FRACTIONAL SHORTENING: 32 % (ref 28–44)
INTERVENTRICULAR SEPTUM: 0.97 CM (ref 0.6–1.1)
IVRT: 84 MSEC
LEFT ATRIUM SIZE: 4.3 CM
LEFT INTERNAL DIMENSION IN SYSTOLE: 2.56 CM (ref 2.1–4)
LEFT VENTRICLE DIASTOLIC VOLUME INDEX: 31.67 ML/M2
LEFT VENTRICLE DIASTOLIC VOLUME: 60.8 ML
LEFT VENTRICLE MASS INDEX: 61 G/M2
LEFT VENTRICLE SYSTOLIC VOLUME INDEX: 12.3 ML/M2
LEFT VENTRICLE SYSTOLIC VOLUME: 23.7 ML
LEFT VENTRICULAR INTERNAL DIMENSION IN DIASTOLE: 3.77 CM (ref 3.5–6)
LEFT VENTRICULAR MASS: 117.51 G
LV LATERAL E/E' RATIO: 5.31 M/S
LV SEPTAL E/E' RATIO: 8.63 M/S
LVOT MG: 1 MMHG
LVOT MV: 0.51 CM/S
MV PEAK A VEL: 0.87 M/S
MV PEAK E VEL: 0.69 M/S
MV STENOSIS PRESSURE HALF TIME: 60 MS
MV VALVE AREA P 1/2 METHOD: 3.67 CM2
PISA TR MAX VEL: 2.37 M/S
RA PRESSURE: 3 MMHG
RIGHT VENTRICULAR END-DIASTOLIC DIMENSION: 2.96 CM
TDI LATERAL: 0.13 M/S
TDI SEPTAL: 0.08 M/S
TDI: 0.11 M/S
TR MAX PG: 22 MMHG
TV REST PULMONARY ARTERY PRESSURE: 25 MMHG

## 2023-05-30 ENCOUNTER — OFFICE VISIT (OUTPATIENT)
Dept: CARDIOLOGY | Facility: CLINIC | Age: 78
End: 2023-05-30
Payer: MEDICARE

## 2023-05-30 VITALS
WEIGHT: 177 LBS | HEART RATE: 83 BPM | DIASTOLIC BLOOD PRESSURE: 72 MMHG | HEIGHT: 67 IN | SYSTOLIC BLOOD PRESSURE: 120 MMHG | OXYGEN SATURATION: 100 % | BODY MASS INDEX: 27.78 KG/M2

## 2023-05-30 DIAGNOSIS — I10 PRIMARY HYPERTENSION: ICD-10-CM

## 2023-05-30 DIAGNOSIS — Z82.49 FAMILY HISTORY OF ASCVD: ICD-10-CM

## 2023-05-30 DIAGNOSIS — E78.00 PURE HYPERCHOLESTEROLEMIA: ICD-10-CM

## 2023-05-30 PROCEDURE — 99213 OFFICE O/P EST LOW 20 MIN: CPT | Mod: PBBFAC,PN | Performed by: NURSE PRACTITIONER

## 2023-05-30 PROCEDURE — 99999 PR PBB SHADOW E&M-EST. PATIENT-LVL III: CPT | Mod: PBBFAC,,, | Performed by: NURSE PRACTITIONER

## 2023-05-30 PROCEDURE — 99999 PR PBB SHADOW E&M-EST. PATIENT-LVL III: ICD-10-PCS | Mod: PBBFAC,,, | Performed by: NURSE PRACTITIONER

## 2023-05-30 PROCEDURE — 99214 OFFICE O/P EST MOD 30 MIN: CPT | Mod: S$PBB,,, | Performed by: NURSE PRACTITIONER

## 2023-05-30 PROCEDURE — 99214 PR OFFICE/OUTPT VISIT, EST, LEVL IV, 30-39 MIN: ICD-10-PCS | Mod: S$PBB,,, | Performed by: NURSE PRACTITIONER

## 2023-05-30 NOTE — PROGRESS NOTES
Subjective:    Patient ID:  Michael Ann Jr. is a 78 y.o. male patient here for evaluation Results and Follow-up    History of Present Illness:     No CP, palpitations or shortness of breath  Has had episodes of dizziness; on gabapentin and BP meds  No syncope. Symptoms resolved quickly on its own  Doesn't take BP at home  Has ongoing struggles w/ neuropathy and left foot drop  Lost son in January 2023  Here for stress and echo results  No smoking; rare ETOH use  No sleep apnea          Most Recent Echocardiogram Results  Results for orders placed during the hospital encounter of 05/16/23    Echo    Interpretation Summary  · The left ventricle is normal in size with concentric remodeling and normal systolic function.  · The estimated ejection fraction is 65%.  · Normal left ventricular diastolic function.  · Normal right ventricular size with normal right ventricular systolic function.  · Normal central venous pressure (3 mmHg).  · The estimated PA systolic pressure is 25 mmHg.      Most Recent Nuclear Stress Test Results  Results for orders placed during the hospital encounter of 05/16/23    Nuclear Stress - Cardiology Interpreted    Interpretation Summary    Normal myocardial perfusion scan. There is no evidence of myocardial ischemia or infarction.    There is a mild intensity fixed perfusion abnormality in the anteroapical and apical wall of the left ventricle, secondary to soft tissue attenuation.    The gated perfusion images showed an ejection fraction of 69% at rest. The gated perfusion images showed an ejection fraction of 73% post stress. Normal ejection fraction is greater than 53%.    There is normal wall motion at rest and post stress.    LV cavity size is normal at rest and normal at stress.    The ECG portion of the study is negative for ischemia.    The patient reported no chest pain during the stress test.    During stress, rare PVCs are noted.      Most Recent Cardiac PET Stress Test  Results  No results found for this or any previous visit.      Most Recent Cardiovascular Angiogram results  No results found for this or any previous visit.      Other Most Recent Cardiology Results  Results for orders placed in visit on 01/08/18    CARDIOLOGY REPORT      REVIEW OF SYSTEMS: As noted in HPI   CARDIOVASCULAR: No recent chest pain, palpitations, arm, neck, or jaw pain.  RESPIRATORY: No recent fever, cough chills, SOB.  : No blood in the urine  GI: No nausea, vomiting, or blood in stool.   MUSCULOSKELETAL: +neuropathy and foot drop; Weak in hips, left foot brace, Fall one year ago; uses walker for balance;    NEURO: No syncope, lightheadedness, or dizziness. Per HPI  EYES: No sudden changes in vision.     Past Medical History:   Diagnosis Date    Colon polyps     Hearing loss     Hiatal hernia     Hypertension     Prostate disease     Squamous cell carcinoma of skin     Tuberculosis      Past Surgical History:   Procedure Laterality Date    eye lid surgey      HAND SURGERY      HAND SURGERY      HERNIA REPAIR      HERNIA REPAIR      NECK SURGERY      NECK SURGERY      PROSTATE SURGERY      PROSTATECTOMY      SHOULDER SURGERY      SKIN CANCER EXCISION      Skin Cancer removed from his left arm and left shoulder       Social History     Tobacco Use    Smoking status: Never     Passive exposure: Never    Smokeless tobacco: Never   Substance Use Topics    Alcohol use: No     Alcohol/week: 0.0 standard drinks    Drug use: No         Objective      Vitals:    05/30/23 0929   BP: 120/72   Pulse: 83       LAST EKG  Results for orders placed or performed in visit on 04/11/23   IN OFFICE EKG 12-LEAD (to [place])    Collection Time: 04/11/23 11:21 AM    Narrative    Test Reason : Z91.89,E78.00,R94.31,    Vent. Rate : 078 BPM     Atrial Rate : 078 BPM     P-R Int : 202 ms          QRS Dur : 094 ms      QT Int : 374 ms       P-R-T Axes : 040 063 019 degrees     QTc Int : 426 ms    Normal sinus rhythm with sinus  arrhythmia  Nonspecific ST abnormality  Abnormal ECG  When compared with ECG of 03-FEB-2023 11:13,  No significant change was found  Confirmed by Anthony Martins MD ([id number]) on 4/14/2023 12:13:18 PM    Referred By: NATHANIEL   SELF           Confirmed By:Anthony Martins MD     LIPIDS - LAST 2   Lab Results   Component Value Date    CHOL 143 02/16/2023    CHOL 157 04/19/2022    HDL 47 02/16/2023    HDL 37 (L) 04/19/2022    LDLCALC 78.4 02/16/2023    LDLCALC 101.0 04/19/2022    TRIG 88 02/16/2023    TRIG 95 04/19/2022    CHOLHDL 32.9 02/16/2023    CHOLHDL 23.6 04/19/2022     CARDIAC PROFILE - LAST 2  Lab Results   Component Value Date    BNP 20 02/03/2023    BNP 23 08/15/2017     (H) 01/15/2021     (H) 01/12/2021      CBC - LAST 2  Lab Results   Component Value Date    WBC 5.95 02/03/2023    WBC 6.54 04/19/2022    RBC 5.32 02/03/2023    RBC 4.58 (L) 04/19/2022    HGB 17.5 02/03/2023    HGB 15.4 04/19/2022    HCT 49.9 02/03/2023    HCT 45.3 04/19/2022     02/03/2023     04/19/2022     No results found for: LABPT, INR, APTT  CHEMISTRY - LAST 2  Lab Results   Component Value Date     02/03/2023     04/19/2022    K 3.5 02/03/2023    K 3.4 (L) 04/19/2022    CL 97 02/03/2023     04/19/2022    CO2 28 02/03/2023    CO2 30 (H) 04/19/2022    ANIONGAP 13 02/03/2023    ANIONGAP 9 04/19/2022    BUN 12 02/03/2023    BUN 20 04/19/2022    CREATININE 0.8 02/03/2023    CREATININE 0.8 04/19/2022    GLU 98 02/03/2023    GLU 91 04/19/2022    CALCIUM 9.3 02/03/2023    CALCIUM 9.1 04/19/2022    MG 2.1 02/03/2023    ALBUMIN 4.2 02/03/2023    ALBUMIN 3.5 04/19/2022    PROT 7.5 02/03/2023    PROT 6.3 04/19/2022    ALKPHOS 55 02/03/2023    ALKPHOS 57 04/19/2022    ALT 35 02/03/2023    ALT 27 04/19/2022    AST 28 02/03/2023    AST 25 04/19/2022    BILITOT 1.2 (H) 02/03/2023    BILITOT 1.2 (H) 04/19/2022      ENDOCRINE - LAST 2  Lab Results   Component Value Date    TSH 1.498 10/02/2017        PHYSICAL  EXAM  CONSTITUTIONAL: Well built, well nourished in no apparent distress  NECK: no carotid bruit, no JVD  LUNGS: CTA  CHEST WALL: no tenderness  HEART: regular rate and rhythm, S1, S2 normal, no murmur, click, rub or gallop   ABDOMEN: soft, non-tender; bowel sounds normal; no masses,  no organomegaly  EXTREMITIES: Extremities normal, no edema, no calf tenderness noted  NEURO: AAO X 3    I HAVE REVIEWED :    The vital signs, most recent cardiac testing, and most recent pertinent non-cardiology provider notes.    Current Outpatient Medications   Medication Instructions    albuterol (PROVENTIL HFA) 90 mcg/actuation inhaler 2 puffs, Inhalation, Every 6 hours PRN, Rescue    alpha lipoic acid 200 mg, Oral, 3 times daily    amitriptyline (ELAVIL) 10 mg, Oral, Nightly PRN    amLODIPine (NORVASC) 2.5 mg, Oral, Daily    atorvastatin (LIPITOR) 40 mg, Oral, Daily    fluticasone propionate (FLONASE) 50 mcg, Each Nostril, Daily    gabapentin (NEURONTIN) 300 MG capsule TAKE 1 CAPSULE BY MOUTH 3  TIMES DAILY    miSOPROStoL (CYTOTEC) 100 mcg, Oral, 2 times daily with meals    multivit-min/FA/lycopen/lutein (CENTRUM SILVER MEN ORAL) Oral    pantoprazole (PROTONIX) 40 mg, Oral, Daily    triamterene-hydrochlorothiazide 37.5-25 mg (MAXZIDE-25) 37.5-25 mg per tablet 1 tablet, Oral, Daily      Assessment & Plan     BMI 27.0-27.9,adult  Stable    Hyperlipidemia   Latest Reference Range & Units Most Recent   LDL Cholesterol External 63.0 - 159.0 mg/dL 78.4  2/16/23 07:30   Continue Lipitor 40 mg daily  Heart healthy diet  Low impact exercise unable to do much d/t weakness and neuropathy    Hypertension  /72 mmHg;   Continue amlodipine 2.5 mg daily, Maxzide 37.5-25 mg daily  Low sodium diet    Family history of ASCVD  Stress and echo results discussed    Follow up in 6 months

## 2023-05-30 NOTE — ASSESSMENT & PLAN NOTE
Latest Reference Range & Units Most Recent   LDL Cholesterol External 63.0 - 159.0 mg/dL 78.4  2/16/23 07:30   Continue Lipitor 40 mg daily  Heart healthy diet  Low impact exercise unable to do much d/t weakness and neuropathy

## 2023-05-31 ENCOUNTER — OFFICE VISIT (OUTPATIENT)
Dept: FAMILY MEDICINE | Facility: CLINIC | Age: 78
End: 2023-05-31
Payer: MEDICARE

## 2023-05-31 VITALS
DIASTOLIC BLOOD PRESSURE: 60 MMHG | SYSTOLIC BLOOD PRESSURE: 110 MMHG | RESPIRATION RATE: 16 BRPM | WEIGHT: 178.56 LBS | OXYGEN SATURATION: 95 % | BODY MASS INDEX: 28.02 KG/M2 | HEART RATE: 88 BPM | HEIGHT: 67 IN | TEMPERATURE: 98 F

## 2023-05-31 DIAGNOSIS — Z00.00 HEALTHCARE MAINTENANCE: Primary | ICD-10-CM

## 2023-05-31 DIAGNOSIS — I10 PRIMARY HYPERTENSION: ICD-10-CM

## 2023-05-31 DIAGNOSIS — G60.9 IDIOPATHIC PERIPHERAL NEUROPATHY: ICD-10-CM

## 2023-05-31 DIAGNOSIS — K21.9 GASTROESOPHAGEAL REFLUX DISEASE, UNSPECIFIED WHETHER ESOPHAGITIS PRESENT: ICD-10-CM

## 2023-05-31 DIAGNOSIS — E78.00 PURE HYPERCHOLESTEROLEMIA: ICD-10-CM

## 2023-05-31 PROCEDURE — 99214 OFFICE O/P EST MOD 30 MIN: CPT | Mod: S$PBB,,, | Performed by: STUDENT IN AN ORGANIZED HEALTH CARE EDUCATION/TRAINING PROGRAM

## 2023-05-31 PROCEDURE — 99214 PR OFFICE/OUTPT VISIT, EST, LEVL IV, 30-39 MIN: ICD-10-PCS | Mod: S$PBB,,, | Performed by: STUDENT IN AN ORGANIZED HEALTH CARE EDUCATION/TRAINING PROGRAM

## 2023-05-31 PROCEDURE — 99999 PR PBB SHADOW E&M-EST. PATIENT-LVL III: ICD-10-PCS | Mod: PBBFAC,,, | Performed by: STUDENT IN AN ORGANIZED HEALTH CARE EDUCATION/TRAINING PROGRAM

## 2023-05-31 PROCEDURE — 99999 PR PBB SHADOW E&M-EST. PATIENT-LVL III: CPT | Mod: PBBFAC,,, | Performed by: STUDENT IN AN ORGANIZED HEALTH CARE EDUCATION/TRAINING PROGRAM

## 2023-05-31 PROCEDURE — 99213 OFFICE O/P EST LOW 20 MIN: CPT | Mod: PBBFAC,PO | Performed by: STUDENT IN AN ORGANIZED HEALTH CARE EDUCATION/TRAINING PROGRAM

## 2023-05-31 NOTE — PROGRESS NOTES
[facility] Note    Subjective:    The HPI and pertinent ROS is included in the Diagnostic Impression Remarks section at the end of the note. Please see below for further details. Chief complaint is at end of note.     Michael is a pleasant intelligent patient who is here for evaluation.     Modified Medications    No medications on file       Data reviewed 274}  Previous medical records reviewed and summarized in plan section at end of note.      If you are due for any health screening(s) below please notify me so we can arrange them to be ordered and scheduled. Most healthy patients at your age complete them, but you are free to accept or refuse. If you can't do it, I'll definitely understand. If you can, I'd certainly appreciate it!     All of your core healthy metrics are met.      The following portions of the patient's history were reviewed and updated as appropriate: allergies, current medications, past family history, past medical history, past social history, past surgical history and problem list.    He  has a past medical history of Colon polyps, Hearing loss, Hiatal hernia, Hypertension, Prostate disease, Squamous cell carcinoma of skin, and Tuberculosis.  He  has a past surgical history that includes Hernia repair; Neck surgery; Hand surgery; Prostatectomy; Neck surgery; Prostate surgery; Hernia repair; Skin cancer excision; Shoulder surgery; Hand surgery; eye lid surgey; and Skin Cancer removed from his left arm and left shoulder.    He  reports that he has never smoked. He has never been exposed to tobacco smoke. He has never used smokeless tobacco. He reports that he does not drink alcohol and does not use drugs.  He family history includes Prostate cancer in his brother and brother.    Review of patient's allergies indicates:  No Known Allergies    Tobacco Use: Low Risk     Smoking Tobacco Use: Never    Smokeless Tobacco Use: Never    Passive Exposure: Never     Physical  "Examination  General appearance: alert, cooperative, no distress  Neck: no thyromegaly, no neck stiffness  Lungs: clear to auscultation, no wheezes, rales or rhonchi, symmetric air entry  Heart: normal rate, regular rhythm, normal S1, S2, no murmurs, rubs, clicks or gallops  Abdomen: soft, nontender, nondistended, no rigidity, rebound, or guarding.   Back: no point tenderness over spine  Extremities: peripheral pulses normal, no unilateral leg swelling or calf tenderness   Neurological:alert, oriented, normal speech, no new focal findings or movement disorder noted from baseline    BP Readings from Last 3 Encounters:   05/31/23 110/60   05/30/23 120/72   04/11/23 118/76     Wt Readings from Last 3 Encounters:   05/31/23 81 kg (178 lb 9.2 oz)   05/30/23 80.3 kg (177 lb)   05/16/23 80.7 kg (178 lb)     /60 (BP Location: Right arm, Patient Position: Sitting, BP Method: Large (Manual))   Pulse 88   Temp 98 °F (36.7 °C) (Oral)   Resp 16   Ht 5' 7" (1.702 m)   Wt 81 kg (178 lb 9.2 oz)   SpO2 95%   BMI 27.97 kg/m²    274}  Laboratory: I have reviewed old labs below:    274}    Lab Results   Component Value Date    WBC 5.95 02/03/2023    HGB 17.5 02/03/2023    HCT 49.9 02/03/2023    MCV 94 02/03/2023     02/03/2023     02/03/2023    K 3.5 02/03/2023    CL 97 02/03/2023    CALCIUM 9.3 02/03/2023    CO2 28 02/03/2023    GLU 98 02/03/2023    BUN 12 02/03/2023    CREATININE 0.8 02/03/2023    ANIONGAP 13 02/03/2023    PROT 7.5 02/03/2023    ALBUMIN 4.2 02/03/2023    BILITOT 1.2 (H) 02/03/2023    ALKPHOS 55 02/03/2023    ALT 35 02/03/2023    AST 28 02/03/2023    CHOL 143 02/16/2023    TRIG 88 02/16/2023    HDL 47 02/16/2023    LDLCALC 78.4 02/16/2023    TSH 1.498 10/02/2017    PSA <0.01 08/09/2016     Lab reviewed by me: Particular labs of significance that I will monitor, workup, or treat to improve are mentioned below in diagnostic impression remarks.    Imaging/EKG: I have reviewed the pertinent " "results and my findings are noted in remarks.  274}    CC:   Chief Complaint   Patient presents with    Hypertension    Follow-up        274}    Assessment/Plan  Michael Ann Jr. is a 78 y.o. male who presents to clinic with:  1. Healthcare maintenance    2. Primary hypertension    3. Pure hypercholesterolemia    4. Idiopathic peripheral neuropathy    5. Gastroesophageal reflux disease, unspecified whether esophagitis present       274}  Diagnostic Impression Remarks + HPI     Documentation entered by me for this encounter may have been done in part using speech-recognition technology. Although I have made an effort to ensure accuracy, "sound like" errors may exist and should be interpreted in context.     Hypertension well controlled continue current meds   Peripheral neuropathy-stable is in gabapentin wants per day can try twice per day continue current medications negative workup   Hyperlipidemia-well controlled continue statin monitor   GERD-well controlled continue PPI monitor  Health maintenance-had normal stress test    This is the extent of this pleasant patient's concerns at this present time. He did not feel chest pain upon exertion, dyspnea, nausea, vomiting, diaphoresis, or syncope. No pleuritic chest pain, unilateral leg swelling, calf tenderness, or calf pain. Negative for unintentional weight loss night sweats and fevers. Michael will return to clinic in a few months for further workup and reassessment or sooner as needed. He was instructed to call the clinic or go to the emergency department or urgent care immediately if his symptoms do not improve, worsens, or if any new symptoms develop. As we discussed that symptoms could worsen over the next 24 hours he was advised that if any increased swelling, pain, or numbness arise to go immediately to the ED. Patient knows to call any time if an emergency arises. Shared decision making occurred and he verbalized understanding in agreement with " this plan. I discussed imaging findings, diagnosis, possibilities, treatment options, medications, risks, and benefits. He had many questions regarding the options and long-term effects. All questions were answered. He expressed understanding after counseling regarding the diagnosis and recommendations. He was capable and demonstrated competence with understanding of these options. Shared decision making was performed resulting in him choosing the current treatment plan. Patient handout was given with instructions and recommendations. Advised the patient that if they become pregnant to alert us immediately to assess for medication changes. I also discussed the importance of close follow up to discuss labs, change or modify his medications if needed, monitor side effects, and further evaluation of medical problems.     Additional workup planned: see labs ordered below.    See below for labs and meds ordered with associated diagnosis      1. Healthcare maintenance    2. Primary hypertension    3. Pure hypercholesterolemia    4. Idiopathic peripheral neuropathy    5. Gastroesophageal reflux disease, unspecified whether esophagitis present      Roel Tyson MD   274}    If you are due for any health screening(s) below please notify me so we can arrange them to be ordered and scheduled. Most healthy patients at your age complete them, but you are free to accept or refuse.     If you can't do it, I'll definitely understand. If you can, I'd certainly appreciate it!   All of your core healthy metrics are met.

## 2023-09-23 ENCOUNTER — IMMUNIZATION (OUTPATIENT)
Dept: FAMILY MEDICINE | Facility: CLINIC | Age: 78
End: 2023-09-23
Payer: MEDICARE

## 2023-09-23 PROCEDURE — 99999PBSHW FLU VACCINE - QUADRIVALENT - ADJUVANTED: ICD-10-PCS | Mod: PBBFAC,,,

## 2023-09-23 PROCEDURE — 99999PBSHW FLU VACCINE - QUADRIVALENT - ADJUVANTED: Mod: PBBFAC,,,

## 2023-09-23 PROCEDURE — 90694 VACC AIIV4 NO PRSRV 0.5ML IM: CPT | Mod: PBBFAC,PO

## 2023-12-14 DIAGNOSIS — E78.5 HYPERLIPIDEMIA, UNSPECIFIED HYPERLIPIDEMIA TYPE: ICD-10-CM

## 2023-12-15 RX ORDER — AMLODIPINE BESYLATE 2.5 MG/1
2.5 TABLET ORAL
Qty: 90 TABLET | Refills: 1 | Status: SHIPPED | OUTPATIENT
Start: 2023-12-15

## 2023-12-15 RX ORDER — ATORVASTATIN CALCIUM 40 MG/1
40 TABLET, FILM COATED ORAL
Qty: 90 TABLET | Refills: 0 | Status: SHIPPED | OUTPATIENT
Start: 2023-12-15 | End: 2024-02-16

## 2023-12-15 NOTE — TELEPHONE ENCOUNTER
Care Due:                  Date            Visit Type   Department     Provider  --------------------------------------------------------------------------------                                EP -                              PRIMARY      [facility]  Last Visit: 05-      CARE ([facility])   ERNESTINA Tyson                              EP -                              PRIMARY      [facility]  Next Visit: 01-      CARE ([facility])   MEDICINE       Roel Tyson                                                            Last  Test          Frequency    Reason                     Performed    Due Date  --------------------------------------------------------------------------------    CMP.........  12 months..  atorvastatin,              02- 01-                             triamterene-hydrochloroth                             iazide...................    Lipid Panel.  12 months..  atorvastatin.............  02- 02-    [facility] Embedded Care Due Messages. Reference number: [id number].   12/14/2023 10:00:19 PM CST

## 2023-12-15 NOTE — TELEPHONE ENCOUNTER
Provider Staff:  Action required for this patient    Requires labs      Please see care gap opportunities below in Care Due Message.    Thanks!  [facility]     Appointments      Date Provider   Last Visit   5/31/2023 Roel Tyson MD   Next Visit   1/9/2024 Roel Tyson MD     Refill Decision Note   Michael Ann  is requesting a refill authorization.  Brief Assessment and Rationale for Refill:  Approve     Medication Therapy Plan:        Comments:     Note composed:12:28 PM 12/15/2023

## 2024-01-09 ENCOUNTER — OFFICE VISIT (OUTPATIENT)
Dept: FAMILY MEDICINE | Facility: CLINIC | Age: 79
End: 2024-01-09
Payer: MEDICARE

## 2024-01-09 VITALS
TEMPERATURE: 98 F | HEIGHT: 67 IN | WEIGHT: 180.75 LBS | RESPIRATION RATE: 18 BRPM | SYSTOLIC BLOOD PRESSURE: 120 MMHG | OXYGEN SATURATION: 97 % | DIASTOLIC BLOOD PRESSURE: 66 MMHG | BODY MASS INDEX: 28.37 KG/M2 | HEART RATE: 70 BPM

## 2024-01-09 DIAGNOSIS — R79.9 ABNORMAL FINDING OF BLOOD CHEMISTRY, UNSPECIFIED: ICD-10-CM

## 2024-01-09 DIAGNOSIS — I10 PRIMARY HYPERTENSION: ICD-10-CM

## 2024-01-09 DIAGNOSIS — M54.2 NECK PAIN: Primary | ICD-10-CM

## 2024-01-09 DIAGNOSIS — E78.2 MIXED HYPERLIPIDEMIA: ICD-10-CM

## 2024-01-09 DIAGNOSIS — K21.9 GASTROESOPHAGEAL REFLUX DISEASE, UNSPECIFIED WHETHER ESOPHAGITIS PRESENT: ICD-10-CM

## 2024-01-09 DIAGNOSIS — M62.838 NECK MUSCLE SPASM: ICD-10-CM

## 2024-01-09 PROCEDURE — 99213 OFFICE O/P EST LOW 20 MIN: CPT | Mod: PBBFAC,PO | Performed by: STUDENT IN AN ORGANIZED HEALTH CARE EDUCATION/TRAINING PROGRAM

## 2024-01-09 PROCEDURE — 99999 PR PBB SHADOW E&M-EST. PATIENT-LVL III: CPT | Mod: PBBFAC,,, | Performed by: STUDENT IN AN ORGANIZED HEALTH CARE EDUCATION/TRAINING PROGRAM

## 2024-01-09 PROCEDURE — 99214 OFFICE O/P EST MOD 30 MIN: CPT | Mod: S$PBB,,, | Performed by: STUDENT IN AN ORGANIZED HEALTH CARE EDUCATION/TRAINING PROGRAM

## 2024-01-09 RX ORDER — TIZANIDINE 2 MG/1
2 TABLET ORAL EVERY 8 HOURS PRN
Qty: 180 TABLET | Refills: 3 | Status: SHIPPED | OUTPATIENT
Start: 2024-01-09 | End: 2024-04-08

## 2024-01-09 NOTE — PROGRESS NOTES
[facility] Note    Subjective:    The HPI and pertinent ROS is included in the Diagnostic Impression Remarks section at the end of the note. Please see below for further details. Chief complaint is at end of note.     Michael is a pleasant intelligent patient who is here for evaluation.     Modified Medications    No medications on file       Data reviewed 274}  Previous medical records reviewed and summarized in plan section at end of note.      If you are due for any health screening(s) below please notify me so we can arrange them to be ordered and scheduled. Most healthy patients at your age complete them, but you are free to accept or refuse. If you can't do it, I'll definitely understand. If you can, I'd certainly appreciate it!     All of your core healthy metrics are met.      The following portions of the patient's history were reviewed and updated as appropriate: allergies, current medications, past family history, past medical history, past social history, past surgical history and problem list.    He  has a past medical history of Colon polyps, Hearing loss, Hiatal hernia, Hypertension, Prostate disease, Squamous cell carcinoma of skin, and Tuberculosis.  He  has a past surgical history that includes Hernia repair; Neck surgery; Hand surgery; Prostatectomy; Neck surgery; Prostate surgery; Hernia repair; Skin cancer excision; Shoulder surgery; Hand surgery; eye lid surgey; and Skin Cancer removed from his left arm and left shoulder.    He  reports that he has never smoked. He has never been exposed to tobacco smoke. He has never used smokeless tobacco. He reports that he does not drink alcohol and does not use drugs.  He family history includes Prostate cancer in his brother and brother.    Review of patient's allergies indicates:  No Known Allergies    Tobacco Use: Low Risk  (1/9/2024)    Patient History     Smoking Tobacco Use: Never     Smokeless Tobacco Use: Never     Passive  "Exposure: Never     Physical Examination  General appearance: alert, cooperative, no distress  Neck: no thyromegaly, no neck stiffness  Lungs: clear to auscultation, no wheezes, rales or rhonchi, symmetric air entry  Heart: normal rate, regular rhythm, normal S1, S2, no murmurs, rubs, clicks or gallops  Abdomen: soft, nontender, nondistended, no rigidity, rebound, or guarding.   Back: no point tenderness over spine  Extremities: peripheral pulses normal, no unilateral leg swelling or calf tenderness   Neurological:alert, oriented, normal speech, no new focal findings or movement disorder noted from baseline    BP Readings from Last 3 Encounters:   01/09/24 120/66   05/31/23 110/60   05/30/23 120/72     Wt Readings from Last 3 Encounters:   01/09/24 82 kg (180 lb 12.4 oz)   05/31/23 81 kg (178 lb 9.2 oz)   05/30/23 80.3 kg (177 lb)     /66 (BP Location: Right arm, Patient Position: Sitting, BP Method: Large (Manual))   Pulse 70   Temp 97.8 °F (36.6 °C) (Oral)   Resp 18   Ht 5' 7" (1.702 m)   Wt 82 kg (180 lb 12.4 oz)   SpO2 97%   BMI 28.31 kg/m²    274}  Laboratory: I have reviewed old labs below:    274}    Lab Results   Component Value Date    WBC 5.95 02/03/2023    HGB 17.5 02/03/2023    HCT 49.9 02/03/2023    MCV 94 02/03/2023     02/03/2023     02/03/2023    K 3.5 02/03/2023    CL 97 02/03/2023    CALCIUM 9.3 02/03/2023    CO2 28 02/03/2023    GLU 98 02/03/2023    BUN 12 02/03/2023    CREATININE 0.8 02/03/2023    EGFRNORACEVR >60.0 02/03/2023    ANIONGAP 13 02/03/2023    PROT 7.5 02/03/2023    ALBUMIN 4.2 02/03/2023    BILITOT 1.2 (H) 02/03/2023    ALKPHOS 55 02/03/2023    ALT 35 02/03/2023    AST 28 02/03/2023    CHOL 143 02/16/2023    TRIG 88 02/16/2023    HDL 47 02/16/2023    LDLCALC 78.4 02/16/2023    TSH 1.498 10/02/2017    PSA <0.01 08/09/2016      Lab reviewed by me: Particular labs of significance that I will monitor, workup, or treat to improve are mentioned below in diagnostic " "impression remarks.    Imaging/EKG: I have reviewed the pertinent results and my findings are noted in remarks.  274}    CC:   Chief Complaint   Patient presents with    Follow-up    Neck Pain    Hypertension        274}    Assessment/Plan  Michael Ann Jr. is a 78 y.o. male who presents to clinic with:  1. Neck pain    2. Neck muscle spasm    3. Primary hypertension    4. Gastroesophageal reflux disease, unspecified whether esophagitis present    5. Mixed hyperlipidemia    6. Abnormal finding of blood chemistry, unspecified       274}  Diagnostic Impression Remarks + HPI     Documentation entered by me for this encounter may have been done in part using speech-recognition technology. Although I have made an effort to ensure accuracy, "sound like" errors may exist and should be interpreted in context.     Neck pain intermittent also has spasms in right side no falls rec PT avoid nsaids continue gabapentin went over some supplements   Will try zanaflex continue TCA went over some supplements he can try as well    Hypertension stable continue current meds monitor no headache or chest pain f/u blood work   GERD-stable continue current meds no dysphagia  HLD stable continue current meds monitor blood work rec mediterranean diet       Health maintenance-patient follows up with urologist will get blood work    This is the extent of this pleasant patient's concerns at this present time. He did not feel chest pain upon exertion, dyspnea, nausea, vomiting, diaphoresis, or syncope. No pleuritic chest pain, unilateral leg swelling, calf tenderness, or calf pain. Negative for unintentional weight loss night sweats, hematuria, and fevers. Michael will return to clinic in a few months for further workup and reassessment or sooner as needed. He was instructed to call the clinic or go to the emergency department or urgent care immediately if his symptoms do not improve, worsens, or if any new symptoms develop. As we " discussed that symptoms could worsen over the next 24 hours he was advised that if any increased swelling, pain, or numbness arise to go immediately to the ED. Patient knows to call any time if an emergency arises. Shared decision making occurred and he verbalized understanding in agreement with this plan. I discussed imaging findings, diagnosis, possibilities, treatment options, medications, risks, and benefits. He had many questions regarding the options and long-term effects. All questions were answered. He expressed understanding after counseling regarding the diagnosis and recommendations. He was capable and demonstrated competence with understanding of these options. Shared decision making was performed resulting in him choosing the current treatment plan. Patient handout was given with instructions and recommendations. Advised the patient that if they become pregnant to alert us immediately to assess for medication changes. Having a healthy weight can decrease the risk of 13 cancers and is an important goal. I also discussed the importance of close follow up to discuss labs, change or modify his medications if needed, monitor side effects, and further evaluation of medical problems.     Additional workup planned: see labs ordered below.    See below for labs and meds ordered with associated diagnosis      1. Neck pain  - tiZANidine (ZANAFLEX) 2 MG tablet; Take 1 tablet (2 mg total) by mouth every 8 (eight) hours as needed (muscle spasms).  Dispense: 180 tablet; Refill: 3    2. Neck muscle spasm  - tiZANidine (ZANAFLEX) 2 MG tablet; Take 1 tablet (2 mg total) by mouth every 8 (eight) hours as needed (muscle spasms).  Dispense: 180 tablet; Refill: 3    3. Primary hypertension  - TSH; Future    4. Gastroesophageal reflux disease, unspecified whether esophagitis present  - TSH; Future    5. Mixed hyperlipidemia    6. Abnormal finding of blood chemistry, unspecified  - CBC Auto Differential; Future  -  Comprehensive Metabolic Panel; Future  - Hemoglobin A1C; Future  - Lipid Panel; Future  - TSH; Future      Roel Tyson MD   274}    If you are due for any health screening(s) below please notify me so we can arrange them to be ordered and scheduled. Most healthy patients at your age complete them, but you are free to accept or refuse.     If you can't do it, I'll definitely understand. If you can, I'd certainly appreciate it!   All of your core healthy metrics are met.

## 2024-02-08 DIAGNOSIS — I10 ESSENTIAL HYPERTENSION: ICD-10-CM

## 2024-02-08 DIAGNOSIS — R60.0 BILATERAL LEG EDEMA: ICD-10-CM

## 2024-02-09 RX ORDER — TRIAMTERENE/HYDROCHLOROTHIAZID 37.5-25 MG
1 TABLET ORAL DAILY
Qty: 90 TABLET | Refills: 0 | Status: SHIPPED | OUTPATIENT
Start: 2024-02-09 | End: 2024-04-15

## 2024-02-09 NOTE — TELEPHONE ENCOUNTER
Refill Routing Note   Medication(s) are not appropriate for processing by [facility] for the following reason(s):        Required labs outdated    [facility] action(s):  Defer               Appointments  past 12m or future 3m with PCP    Date Provider   Last Visit   1/9/2024 Roel Tyson MD   Next Visit   7/16/2024 Roel Tyson MD   ED visits in past 90 days: 0        Note composed:7:28 AM 02/09/2024

## 2024-02-09 NOTE — TELEPHONE ENCOUNTER
No care due was identified.  Health [facility] Embedded Care Due Messages. Reference number: [id number].   2/08/2024 10:32:40 PM CST

## 2024-02-15 DIAGNOSIS — E78.5 HYPERLIPIDEMIA, UNSPECIFIED HYPERLIPIDEMIA TYPE: ICD-10-CM

## 2024-02-16 RX ORDER — ATORVASTATIN CALCIUM 40 MG/1
40 TABLET, FILM COATED ORAL
Qty: 90 TABLET | Refills: 3 | Status: SHIPPED | OUTPATIENT
Start: 2024-02-16

## 2024-02-16 NOTE — TELEPHONE ENCOUNTER
Care Due:                  Date            Visit Type   Department     Provider  --------------------------------------------------------------------------------                                EP -                              PRIMARY      [facility]  Last Visit: 01-      CARE ([facility])   ERNESTINA Tyson                              EP -                              PRIMARY      [facility]  Next Visit: 07-      CARE ([facility])   MEDICINE       Roel Tyson                                                            Last  Test          Frequency    Reason                     Performed    Due Date  --------------------------------------------------------------------------------    CMP.........  12 months..  atorvastatin,              02- 01-                             triamterene-hydrochloroth                             iazide...................    Lipid Panel.  12 months..  atorvastatin.............  02- 02-    [facility] Embedded Care Due Messages. Reference number: [id number].   2/15/2024 9:11:43 PM CST

## 2024-02-16 NOTE — TELEPHONE ENCOUNTER
Refill Routing Note   Medication(s) are not appropriate for processing by [facility] for the following reason(s):        Required labs outdated    [facility] action(s):  Defer     Requires labs : Yes             Appointments  past 12m or future 3m with PCP    Date Provider   Last Visit   1/9/2024 Roel Tyson MD   Next Visit   7/16/2024 Roel Tyson MD   ED visits in past 90 days: 0        Note composed:9:33 PM 02/15/2024

## 2024-03-01 DIAGNOSIS — B02.7 DISSEMINATED HERPES ZOSTER: ICD-10-CM

## 2024-03-01 RX ORDER — GABAPENTIN 300 MG/1
CAPSULE ORAL
Qty: 270 CAPSULE | Refills: 3 | Status: SHIPPED | OUTPATIENT
Start: 2024-03-01 | End: 2025-03-01

## 2024-03-01 NOTE — TELEPHONE ENCOUNTER
No care due was identified.  Health [facility] Embedded Care Due Messages. Reference number: [id number].   3/01/2024 3:35:16 PM CST

## 2024-04-11 DIAGNOSIS — R60.0 BILATERAL LEG EDEMA: ICD-10-CM

## 2024-04-11 DIAGNOSIS — I10 ESSENTIAL HYPERTENSION: ICD-10-CM

## 2024-04-12 NOTE — TELEPHONE ENCOUNTER
Refill Routing Note   Medication(s) are not appropriate for processing by [facility] for the following reason(s):        Required labs outdated: FLOS 7.9.2024    [facility] action(s):  Defer      Medication Therapy Plan:         Appointments  past 12m or future 3m with PCP    Date Provider   Last Visit   1/9/2024 Roel Tyson MD   Next Visit   7/16/2024 Roel Tyson MD   ED visits in past 90 days: 0        Note composed:4:55 PM 04/12/2024

## 2024-04-12 NOTE — TELEPHONE ENCOUNTER
No care due was identified.  [facility] Embedded Care Due Messages. Reference number: [id number].   4/11/2024 9:19:23 PM CDT

## 2024-04-15 RX ORDER — TRIAMTERENE/HYDROCHLOROTHIAZID 37.5-25 MG
1 TABLET ORAL DAILY
Qty: 90 TABLET | Refills: 0 | Status: SHIPPED | OUTPATIENT
Start: 2024-04-15 | End: 2024-06-14

## 2024-05-10 RX ORDER — AMLODIPINE BESYLATE 2.5 MG/1
2.5 TABLET ORAL
Qty: 90 TABLET | Refills: 2 | Status: SHIPPED | OUTPATIENT
Start: 2024-05-10

## 2024-05-11 NOTE — TELEPHONE ENCOUNTER
Provider Staff:  Action required for this patient    Requires labs      Please see care gap opportunities below in Care Due Message.    Thanks!  [facility]     Appointments      Date Provider   Last Visit   1/9/2024 Roel Tyson MD   Next Visit   7/16/2024 Roel Tyson MD     Refill Decision Note   Michael Ann  is requesting a refill authorization.  Brief Assessment and Rationale for Refill:  Approve     Medication Therapy Plan:         Comments:     Note composed:9:47 PM 05/10/2024

## 2024-05-11 NOTE — TELEPHONE ENCOUNTER
Care Due:                  Date            Visit Type   Department     Provider  --------------------------------------------------------------------------------                                EP -                              PRIMARY      [facility]  Last Visit: 01-      CARE ([facility])   ERNESTINA Tyson                              EP -                              PRIMARY      [facility]  Next Visit: 07-      CARE ([facility])   MEDICINE       Roel Tyson                                                            Last  Test          Frequency    Reason                     Performed    Due Date  --------------------------------------------------------------------------------    CMP.........  12 months..  atorvastatin,              02- 01-                             triamterene-hydrochloroth                             iazide...................    Lipid Panel.  12 months..  atorvastatin.............  02- 02-    [facility] Embedded Care Due Messages. Reference number: [id number].   5/10/2024 9:23:41 PM CDT

## 2024-06-13 DIAGNOSIS — R60.0 BILATERAL LEG EDEMA: ICD-10-CM

## 2024-06-13 DIAGNOSIS — I10 ESSENTIAL HYPERTENSION: ICD-10-CM

## 2024-06-14 RX ORDER — TRIAMTERENE/HYDROCHLOROTHIAZID 37.5-25 MG
1 TABLET ORAL DAILY
Qty: 90 TABLET | Refills: 0 | Status: SHIPPED | OUTPATIENT
Start: 2024-06-14

## 2024-06-14 NOTE — TELEPHONE ENCOUNTER
Refill Routing Note   Medication(s) are not appropriate for processing by [facility] for the following reason(s):        Required labs outdated    [facility] action(s):  Defer               Appointments  past 12m or future 3m with PCP    Date Provider   Last Visit   1/9/2024 Roel Tyson MD   Next Visit   7/16/2024 Roel Tyson MD   ED visits in past 90 days: 0        Note composed:11:33 PM 06/13/2024

## 2024-06-14 NOTE — TELEPHONE ENCOUNTER
No care due was identified.  [facility] Embedded Care Due Messages. Reference number: [id number].   6/13/2024 9:50:17 PM CDT

## 2024-07-09 ENCOUNTER — LAB VISIT (OUTPATIENT)
Dept: LAB | Facility: HOSPITAL | Age: 79
End: 2024-07-09
Attending: STUDENT IN AN ORGANIZED HEALTH CARE EDUCATION/TRAINING PROGRAM
Payer: MEDICARE

## 2024-07-09 DIAGNOSIS — I10 PRIMARY HYPERTENSION: ICD-10-CM

## 2024-07-09 DIAGNOSIS — R79.9 ABNORMAL FINDING OF BLOOD CHEMISTRY, UNSPECIFIED: ICD-10-CM

## 2024-07-09 DIAGNOSIS — K21.9 GASTROESOPHAGEAL REFLUX DISEASE, UNSPECIFIED WHETHER ESOPHAGITIS PRESENT: ICD-10-CM

## 2024-07-09 LAB
ALBUMIN SERPL BCP-MCNC: 3.6 G/DL (ref 3.5–5.2)
ALP SERPL-CCNC: 61 U/L (ref 55–135)
ALT SERPL W/O P-5'-P-CCNC: 26 U/L (ref 10–44)
ANION GAP SERPL CALC-SCNC: 12 MMOL/L (ref 8–16)
AST SERPL-CCNC: 25 U/L (ref 10–40)
BASOPHILS # BLD AUTO: 0.06 K/UL (ref 0–0.2)
BASOPHILS NFR BLD: 1 % (ref 0–1.9)
BILIRUB SERPL-MCNC: 1.3 MG/DL (ref 0.1–1)
BUN SERPL-MCNC: 20 MG/DL (ref 8–23)
CALCIUM SERPL-MCNC: 10.1 MG/DL (ref 8.7–10.5)
CHLORIDE SERPL-SCNC: 103 MMOL/L (ref 95–110)
CHOLEST SERPL-MCNC: 156 MG/DL (ref 120–199)
CHOLEST/HDLC SERPL: 3.5 {RATIO} (ref 2–5)
CO2 SERPL-SCNC: 27 MMOL/L (ref 23–29)
CREAT SERPL-MCNC: 0.8 MG/DL (ref 0.5–1.4)
DIFFERENTIAL METHOD BLD: ABNORMAL
EOSINOPHIL # BLD AUTO: 0.1 K/UL (ref 0–0.5)
EOSINOPHIL NFR BLD: 2.1 % (ref 0–8)
ERYTHROCYTE [DISTWIDTH] IN BLOOD BY AUTOMATED COUNT: 13.7 % (ref 11.5–14.5)
EST. GFR  (NO RACE VARIABLE): >60 ML/MIN/1.73 M^2
ESTIMATED AVG GLUCOSE: 103 MG/DL (ref 68–131)
GLUCOSE SERPL-MCNC: 97 MG/DL (ref 70–110)
HBA1C MFR BLD: 5.2 % (ref 4–5.6)
HCT VFR BLD AUTO: 48.4 % (ref 40–54)
HDLC SERPL-MCNC: 45 MG/DL (ref 40–75)
HDLC SERPL: 28.8 % (ref 20–50)
HGB BLD-MCNC: 16.6 G/DL (ref 14–18)
IMM GRANULOCYTES # BLD AUTO: 0.01 K/UL (ref 0–0.04)
IMM GRANULOCYTES NFR BLD AUTO: 0.2 % (ref 0–0.5)
LDLC SERPL CALC-MCNC: 92.6 MG/DL (ref 63–159)
LYMPHOCYTES # BLD AUTO: 1.6 K/UL (ref 1–4.8)
LYMPHOCYTES NFR BLD: 26.4 % (ref 18–48)
MCH RBC QN AUTO: 33 PG (ref 27–31)
MCHC RBC AUTO-ENTMCNC: 34.3 G/DL (ref 32–36)
MCV RBC AUTO: 96 FL (ref 82–98)
MONOCYTES # BLD AUTO: 0.8 K/UL (ref 0.3–1)
MONOCYTES NFR BLD: 12.5 % (ref 4–15)
NEUTROPHILS # BLD AUTO: 3.5 K/UL (ref 1.8–7.7)
NEUTROPHILS NFR BLD: 57.8 % (ref 38–73)
NONHDLC SERPL-MCNC: 111 MG/DL
NRBC BLD-RTO: 0 /100 WBC
PLATELET # BLD AUTO: 214 K/UL (ref 150–450)
PMV BLD AUTO: 10.7 FL (ref 9.2–12.9)
POTASSIUM SERPL-SCNC: 4.1 MMOL/L (ref 3.5–5.1)
PROT SERPL-MCNC: 6.5 G/DL (ref 6–8.4)
RBC # BLD AUTO: 5.03 M/UL (ref 4.6–6.2)
SODIUM SERPL-SCNC: 142 MMOL/L (ref 136–145)
TRIGL SERPL-MCNC: 92 MG/DL (ref 30–150)
TSH SERPL DL<=0.005 MIU/L-ACNC: 1.55 UIU/ML (ref 0.4–4)
WBC # BLD AUTO: 6.06 K/UL (ref 3.9–12.7)

## 2024-07-09 PROCEDURE — 85025 COMPLETE CBC W/AUTO DIFF WBC: CPT | Performed by: STUDENT IN AN ORGANIZED HEALTH CARE EDUCATION/TRAINING PROGRAM

## 2024-07-09 PROCEDURE — 84443 ASSAY THYROID STIM HORMONE: CPT | Performed by: STUDENT IN AN ORGANIZED HEALTH CARE EDUCATION/TRAINING PROGRAM

## 2024-07-09 PROCEDURE — 83036 HEMOGLOBIN GLYCOSYLATED A1C: CPT | Performed by: STUDENT IN AN ORGANIZED HEALTH CARE EDUCATION/TRAINING PROGRAM

## 2024-07-09 PROCEDURE — 80061 LIPID PANEL: CPT | Performed by: STUDENT IN AN ORGANIZED HEALTH CARE EDUCATION/TRAINING PROGRAM

## 2024-07-09 PROCEDURE — 36415 COLL VENOUS BLD VENIPUNCTURE: CPT | Mod: PO | Performed by: STUDENT IN AN ORGANIZED HEALTH CARE EDUCATION/TRAINING PROGRAM

## 2024-07-09 PROCEDURE — 80053 COMPREHEN METABOLIC PANEL: CPT | Performed by: STUDENT IN AN ORGANIZED HEALTH CARE EDUCATION/TRAINING PROGRAM

## 2024-07-16 ENCOUNTER — OFFICE VISIT (OUTPATIENT)
Dept: FAMILY MEDICINE | Facility: CLINIC | Age: 79
End: 2024-07-16
Payer: MEDICARE

## 2024-07-16 VITALS
SYSTOLIC BLOOD PRESSURE: 110 MMHG | DIASTOLIC BLOOD PRESSURE: 68 MMHG | OXYGEN SATURATION: 94 % | HEIGHT: 67 IN | WEIGHT: 176.38 LBS | HEART RATE: 79 BPM | RESPIRATION RATE: 17 BRPM | TEMPERATURE: 98 F | BODY MASS INDEX: 27.68 KG/M2

## 2024-07-16 DIAGNOSIS — M54.2 NECK PAIN: ICD-10-CM

## 2024-07-16 DIAGNOSIS — Z00.00 HEALTHCARE MAINTENANCE: Primary | ICD-10-CM

## 2024-07-16 DIAGNOSIS — I10 PRIMARY HYPERTENSION: ICD-10-CM

## 2024-07-16 DIAGNOSIS — L40.9 PSORIASIS: ICD-10-CM

## 2024-07-16 DIAGNOSIS — E78.2 MIXED HYPERLIPIDEMIA: ICD-10-CM

## 2024-07-16 PROCEDURE — 99213 OFFICE O/P EST LOW 20 MIN: CPT | Mod: PBBFAC,PO | Performed by: STUDENT IN AN ORGANIZED HEALTH CARE EDUCATION/TRAINING PROGRAM

## 2024-07-16 PROCEDURE — G2211 COMPLEX E/M VISIT ADD ON: HCPCS | Mod: S$PBB,,, | Performed by: STUDENT IN AN ORGANIZED HEALTH CARE EDUCATION/TRAINING PROGRAM

## 2024-07-16 PROCEDURE — 99999 PR PBB SHADOW E&M-EST. PATIENT-LVL III: CPT | Mod: PBBFAC,,, | Performed by: STUDENT IN AN ORGANIZED HEALTH CARE EDUCATION/TRAINING PROGRAM

## 2024-07-16 PROCEDURE — 99214 OFFICE O/P EST MOD 30 MIN: CPT | Mod: S$PBB,,, | Performed by: STUDENT IN AN ORGANIZED HEALTH CARE EDUCATION/TRAINING PROGRAM

## 2024-07-16 RX ORDER — TIZANIDINE 2 MG/1
4 TABLET ORAL NIGHTLY
Qty: 180 TABLET | Refills: 3 | Status: SHIPPED | OUTPATIENT
Start: 2024-07-16 | End: 2025-07-11

## 2024-07-16 NOTE — PROGRESS NOTES
[facility] Note    Subjective:    The HPI and pertinent ROS is included in the Diagnostic Impression Remarks section at the end of the note. Please see below for further details. Chief complaint is at end of note.     Michael is a pleasant intelligent patient who is here for evaluation.     Modified Medications    No medications on file       Data reviewed 274}  Previous medical records reviewed and summarized in plan section at end of note.      If you are due for any health screening(s) below please notify me so we can arrange them to be ordered and scheduled. Most healthy patients at your age complete them, but you are free to accept or refuse. If you can't do it, I'll definitely understand. If you can, I'd certainly appreciate it!     All of your core healthy metrics are met.      The following portions of the patient's history were reviewed and updated as appropriate: allergies, current medications, past family history, past medical history, past social history, past surgical history and problem list.    He  has a past medical history of Colon polyps, Hearing loss, Hiatal hernia, Hypertension, Prostate disease, Squamous cell carcinoma of skin, and Tuberculosis.  He  has a past surgical history that includes Hernia repair; Neck surgery; Hand surgery; Prostatectomy; Neck surgery; Prostate surgery; Hernia repair; Skin cancer excision; Shoulder surgery; Hand surgery; eye lid surgey; and Skin Cancer removed from his left arm and left shoulder.    He  reports that he has never smoked. He has never been exposed to tobacco smoke. He has never used smokeless tobacco. He reports that he does not drink alcohol and does not use drugs.  He family history includes Prostate cancer in his brother and brother.    Review of patient's allergies indicates:  No Known Allergies    Tobacco Use: Low Risk  (7/16/2024)    Patient History     Smoking Tobacco Use: Never     Smokeless Tobacco Use: Never     Passive  "Exposure: Never     Physical Examination  Physical Exam  Vital Signs  Vitals show a blood pressure of 110/68.     General appearance: alert, cooperative, no distress  Neck: no thyromegaly, no neck stiffness  Lungs: clear to auscultation, no wheezes, rales or rhonchi, symmetric air entry  Heart: normal rate, regular rhythm, normal S1, S2, no murmurs, rubs, clicks or gallops  Abdomen: soft, nontender, nondistended, no rigidity, rebound, or guarding.   Back: no point tenderness over spine  Extremities: peripheral pulses normal, no unilateral leg swelling or calf tenderness   Neurological:alert, oriented, normal speech, no new focal findings or movement disorder noted from baseline      BP Readings from Last 3 Encounters:   07/16/24 110/68   01/09/24 120/66   05/31/23 110/60     Wt Readings from Last 3 Encounters:   07/16/24 80 kg (176 lb 5.9 oz)   01/09/24 82 kg (180 lb 12.4 oz)   05/31/23 81 kg (178 lb 9.2 oz)     /68 (BP Location: Right arm, Patient Position: Sitting, BP Method: Large (Manual))   Pulse 79   Temp 98.3 °F (36.8 °C) (Oral)   Resp 17   Ht 5' 7" (1.702 m)   Wt 80 kg (176 lb 5.9 oz)   SpO2 (!) 94%   BMI 27.62 kg/m²    274}  Laboratory: I have reviewed old labs below:    274}    Lab Results   Component Value Date    WBC 6.06 07/09/2024    HGB 16.6 07/09/2024    HCT 48.4 07/09/2024    MCV 96 07/09/2024     07/09/2024     07/09/2024    K 4.1 07/09/2024     07/09/2024    CALCIUM 10.1 07/09/2024    CO2 27 07/09/2024    GLU 97 07/09/2024    BUN 20 07/09/2024    CREATININE 0.8 07/09/2024    EGFRNORACEVR >60.0 07/09/2024    ANIONGAP 12 07/09/2024    PROT 6.5 07/09/2024    ALBUMIN 3.6 07/09/2024    BILITOT 1.3 (H) 07/09/2024    ALKPHOS 61 07/09/2024    ALT 26 07/09/2024    AST 25 07/09/2024    CHOL 156 07/09/2024    TRIG 92 07/09/2024    HDL 45 07/09/2024    LDLCALC 92.6 07/09/2024    TSH 1.550 07/09/2024    PSA <0.01 08/09/2016    HGBA1C 5.2 07/09/2024      Lab reviewed by me: " Particular labs of significance that I will monitor, workup, or treat to improve are mentioned below in diagnostic impression remarks.    Results  Laboratory Studies  A1c was 5.2. Triglycerides were 92. LDL was 92. Thyroid function was normal. Kidney function was normal. Liver enzymes were normal. Blood count was normal, no signs of anemia.       Imaging/EKG: I have reviewed the pertinent results and my findings are noted in remarks.  274}    CC:   Chief Complaint   Patient presents with    Follow-up    Hyperlipidemia    Hypertension        274}    History of Present Illness  The patient is a 79-year-old male who is here for follow-up.    He maintains an active lifestyle, engaging in activities such as lawn mowing, cooking, and painting. He has read about CBD gummies and is seeking my opinion. He suspects he may have psoriatic arthritis and reports no pain. However, he mentions soreness when turning his neck. He applies lotion to his psoriasis lesions.    Supplemental Information  He sleeps well at night with the insomnia pill.       Assessment/Plan  Michael Jassotomy Friedman is a 79 y.o. male who presents to clinic with:  1. Healthcare maintenance    2. Primary hypertension    3. Mixed hyperlipidemia    4. Neck pain    5. Psoriasis       274}    Assessment & Plan  Preventive-will get blood work and follow-up on this doing well.     Hypertension -stable continue current meds monitor no headache or chest pain f/u blood work   HLD associated with diabetes-stable continue current meds monitor blood work and recommend healthy diet.   Neck pain stable continue zanaflex nsaids monitor  Psoriasis stable continue current meds         This note was generated with the assistance of ambient listening technology. Verbal consent was obtained by the patient and accompanying visitor(s) for the recording of patient appointment to facilitate this note. I attest to having reviewed and edited the generated note for accuracy, though  some syntax or spelling errors may persist. Please contact the author of this note for any clarification.      1. Healthcare maintenance    2. Primary hypertension    3. Mixed hyperlipidemia    4. Neck pain  - tiZANidine (ZANAFLEX) 2 MG tablet; Take 2 tablets (4 mg total) by mouth nightly.  Dispense: 180 tablet; Refill: 3    5. Psoriasis      Roel Tyson MD   274}    If you are due for any health screening(s) below please notify me so we can arrange them to be ordered and scheduled. Most healthy patients at your age complete them, but you are free to accept or refuse.     If you can't do it, I'll definitely understand. If you can, I'd certainly appreciate it!   All of your core healthy metrics are met.

## 2024-08-15 DIAGNOSIS — R60.0 BILATERAL LEG EDEMA: ICD-10-CM

## 2024-08-15 DIAGNOSIS — I10 ESSENTIAL HYPERTENSION: ICD-10-CM

## 2024-08-16 RX ORDER — TRIAMTERENE/HYDROCHLOROTHIAZID 37.5-25 MG
1 TABLET ORAL DAILY
Qty: 90 TABLET | Refills: 3 | Status: SHIPPED | OUTPATIENT
Start: 2024-08-16

## 2024-08-16 NOTE — TELEPHONE ENCOUNTER
No care due was identified.  [facility] Embedded Care Due Messages. Reference number: [id number].   8/15/2024 10:03:04 PM CDT

## 2024-08-16 NOTE — TELEPHONE ENCOUNTER
Refill Decision Note   Michael Marissa  is requesting a refill authorization.  Brief Assessment and Rationale for Refill:  Approve     Medication Therapy Plan:         Comments:     Note composed:2:31 AM 08/16/2024

## 2024-11-18 ENCOUNTER — IMMUNIZATION (OUTPATIENT)
Dept: FAMILY MEDICINE | Facility: CLINIC | Age: 79
End: 2024-11-18
Payer: MEDICARE

## 2024-11-18 DIAGNOSIS — Z23 NEED FOR VACCINATION: Primary | ICD-10-CM

## 2024-11-18 PROCEDURE — 90653 IIV ADJUVANT VACCINE IM: CPT | Mod: PBBFAC,PO

## 2024-11-18 PROCEDURE — 99999PBSHW FLU VACCINE - ADJUVANTED: Mod: PBBFAC,,,

## 2024-11-26 ENCOUNTER — TELEPHONE (OUTPATIENT)
Dept: FAMILY MEDICINE | Facility: CLINIC | Age: 79
End: 2024-11-26

## 2024-11-26 NOTE — TELEPHONE ENCOUNTER
----- Message from Ana sent at 11/26/2024 12:48 PM CST -----  Contact: Pt Wife  Izabel  Type: Needs Medical Advice         Who Called: Pt Wife  Izabel      Best Call Back Number:  [phone]    Additional Information: Requesting a call back regarding Wife asking for Destinee to call her please. She is needing to talk to office about Rx for [employer] for a leg brace.     Please Advise- Thank you

## 2024-11-26 NOTE — TELEPHONE ENCOUNTER
[facility]   [place]  [phone]  Left  foot drop leg brace    Provided [facility] with correct fax number. Please advise if received. They stated they have ordered the brace and just need new detailed signed orders.

## 2024-12-29 DIAGNOSIS — E78.5 HYPERLIPIDEMIA, UNSPECIFIED HYPERLIPIDEMIA TYPE: ICD-10-CM

## 2024-12-30 RX ORDER — AMLODIPINE BESYLATE 2.5 MG/1
2.5 TABLET ORAL
Qty: 90 TABLET | Refills: 1 | Status: SHIPPED | OUTPATIENT
Start: 2024-12-30

## 2024-12-30 RX ORDER — ATORVASTATIN CALCIUM 40 MG/1
40 TABLET, FILM COATED ORAL
Qty: 90 TABLET | Refills: 1 | Status: SHIPPED | OUTPATIENT
Start: 2024-12-30

## 2024-12-30 NOTE — TELEPHONE ENCOUNTER
No care due was identified.  [facility] Embedded Care Due Messages. Reference number: [id number].   12/29/2024 9:30:07 PM CST

## 2024-12-30 NOTE — TELEPHONE ENCOUNTER
Refill Decision Note   Michael Marissa  is requesting a refill authorization.  Brief Assessment and Rationale for Refill:  Approve     Medication Therapy Plan:         Comments:     Note composed:2:54 PM 12/30/2024

## 2025-01-23 ENCOUNTER — TELEPHONE (OUTPATIENT)
Dept: FAMILY MEDICINE | Facility: CLINIC | Age: 80
End: 2025-01-23
Payer: MEDICARE

## 2025-01-23 NOTE — TELEPHONE ENCOUNTER
----- Message from Ewa sent at 1/23/2025  3:51 PM CST -----  Contact: Izabel (wife)  Type:  Sooner Apoointment Request    Caller is requesting a sooner appointment.  Caller declined first available appointment listed below.  Caller will not accept being placed on the waitlist and is requesting a message be sent to doctor.    Name of Caller: Izabel    When is the first available appointment? Feb    Symptoms: 6 month follow up    Would the patient rather a call back or a response via [facility]?  Call back    Best Call Back Number:     Additional Information: sts wife has appt on 01/31 at 830 and wants to know if pt can be seen with her?    Please call to advise    Thanks

## 2025-01-25 ENCOUNTER — OFFICE VISIT (OUTPATIENT)
Dept: FAMILY MEDICINE | Facility: CLINIC | Age: 80
End: 2025-01-25
Payer: MEDICARE

## 2025-01-25 VITALS
SYSTOLIC BLOOD PRESSURE: 110 MMHG | OXYGEN SATURATION: 98 % | HEIGHT: 67 IN | RESPIRATION RATE: 16 BRPM | TEMPERATURE: 98 F | HEART RATE: 80 BPM | BODY MASS INDEX: 28.02 KG/M2 | DIASTOLIC BLOOD PRESSURE: 70 MMHG | WEIGHT: 178.56 LBS

## 2025-01-25 DIAGNOSIS — M21.372 BILATERAL FOOT-DROP: ICD-10-CM

## 2025-01-25 DIAGNOSIS — Z00.00 HEALTHCARE MAINTENANCE: Primary | ICD-10-CM

## 2025-01-25 DIAGNOSIS — E78.2 MIXED HYPERLIPIDEMIA: ICD-10-CM

## 2025-01-25 DIAGNOSIS — M21.371 BILATERAL FOOT-DROP: ICD-10-CM

## 2025-01-25 DIAGNOSIS — R26.89 BALANCE DISORDER: ICD-10-CM

## 2025-01-25 DIAGNOSIS — I10 PRIMARY HYPERTENSION: ICD-10-CM

## 2025-01-25 DIAGNOSIS — R79.9 ABNORMAL FINDING OF BLOOD CHEMISTRY, UNSPECIFIED: ICD-10-CM

## 2025-01-25 PROCEDURE — 99214 OFFICE O/P EST MOD 30 MIN: CPT | Mod: S$PBB,,, | Performed by: STUDENT IN AN ORGANIZED HEALTH CARE EDUCATION/TRAINING PROGRAM

## 2025-01-25 PROCEDURE — G2211 COMPLEX E/M VISIT ADD ON: HCPCS | Mod: S$PBB,,, | Performed by: STUDENT IN AN ORGANIZED HEALTH CARE EDUCATION/TRAINING PROGRAM

## 2025-01-25 NOTE — PROGRESS NOTES
[facility] Note    Subjective:    The HPI and pertinent ROS is included in the Diagnostic Impression Remarks section at the end of the note. Please see below for further details. Chief complaint is at end of note.     Michael is a pleasant intelligent patient who is here for evaluation.     Modified Medications    No medications on file       Data reviewed 274}  Previous medical records reviewed and summarized in plan section at end of note.      If you are due for any health screening(s) below please notify me so we can arrange them to be ordered and scheduled. Most healthy patients at your age complete them, but you are free to accept or refuse. If you can't do it, I'll definitely understand. If you can, I'd certainly appreciate it!     All of your core healthy metrics are met.      The following portions of the patient's history were reviewed and updated as appropriate: allergies, current medications, past family history, past medical history, past social history, past surgical history and problem list.    He  has a past medical history of Colon polyps, Hearing loss, Hiatal hernia, Hypertension, Prostate disease, Squamous cell carcinoma of skin, and Tuberculosis.  He  has a past surgical history that includes Hernia repair; Neck surgery; Hand surgery; Prostatectomy; Neck surgery; Prostate surgery; Hernia repair; Skin cancer excision; Shoulder surgery; Hand surgery; eye lid surgey; and Skin Cancer removed from his left arm and left shoulder.    He  reports that he has never smoked. He has never been exposed to tobacco smoke. He has never used smokeless tobacco. He reports that he does not drink alcohol and does not use drugs.  He family history includes Prostate cancer in his brother and brother.    Review of patient's allergies indicates:  No Known Allergies    Tobacco Use: Low Risk  (1/25/2025)    Patient History     Smoking Tobacco Use: Never     Smokeless Tobacco Use: Never     Passive  "Exposure: Never     Physical Examination  Physical Exam  Vital Signs  Blood pressure is 110/70.     General appearance: alert, cooperative, no distress  Neck: no thyromegaly, no neck stiffness  Lungs: clear to auscultation, no wheezes, rales or rhonchi, symmetric air entry  Heart: normal rate, regular rhythm, normal S1, S2, no murmurs, rubs, clicks or gallops  Abdomen: soft, nontender, nondistended, no rigidity, rebound, or guarding.   Back: no point tenderness over spine  Extremities: peripheral pulses normal, no unilateral leg swelling or calf tenderness   Neurological:alert, oriented, normal speech, no new focal findings or movement disorder noted from baseline      BP Readings from Last 3 Encounters:   01/25/25 110/70   07/16/24 110/68   01/09/24 120/66     Wt Readings from Last 3 Encounters:   01/25/25 81 kg (178 lb 9.2 oz)   07/16/24 80 kg (176 lb 5.9 oz)   01/09/24 82 kg (180 lb 12.4 oz)     /70 (BP Location: Right arm, Patient Position: Sitting)   Pulse 80   Temp 97.9 °F (36.6 °C) (Oral)   Resp 16   Ht 5' 7" (1.702 m)   Wt 81 kg (178 lb 9.2 oz)   SpO2 98%   BMI 27.97 kg/m²    274}  Laboratory: I have reviewed old labs below:    274}    Lab Results   Component Value Date    WBC 6.06 07/09/2024    HGB 16.6 07/09/2024    HCT 48.4 07/09/2024    MCV 96 07/09/2024     07/09/2024     07/09/2024    K 4.1 07/09/2024     07/09/2024    CALCIUM 10.1 07/09/2024    CO2 27 07/09/2024    GLU 97 07/09/2024    BUN 20 07/09/2024    CREATININE 0.8 07/09/2024    EGFRNORACEVR >60.0 07/09/2024    ANIONGAP 12 07/09/2024    PROT 6.5 07/09/2024    ALBUMIN 3.6 07/09/2024    BILITOT 1.3 (H) 07/09/2024    ALKPHOS 61 07/09/2024    ALT 26 07/09/2024    AST 25 07/09/2024    CHOL 156 07/09/2024    TRIG 92 07/09/2024    HDL 45 07/09/2024    LDLCALC 92.6 07/09/2024    TSH 1.550 07/09/2024    PSA <0.01 08/09/2016    HGBA1C 5.2 07/09/2024      Lab reviewed by me: Particular labs of significance that I will " monitor, workup, or treat to improve are mentioned below in diagnostic impression remarks.    Results  Laboratory Studies  LDL cholesterol was 92.6. A1c levels were normal. Kidney function and liver enzymes were normal.       Imaging/EKG: I have reviewed the pertinent results and my findings are noted in remarks.  274}    CC:   Chief Complaint   Patient presents with    Follow-up    Hypertension        274}    History of Present Illness  The patient is a 79-year-old male who presents for a follow-up visit.    He reports persistent neck pain, which remains unchanged. He experiences soreness when turning his neck, despite engaging in regular exercises. His sleep pattern is consistent, typically retiring at 10:00 PM and achieving restful sleep until 4:00 AM. Upon awakening, he does not experience any pain. He has been using an elliptical machine frequently but notes the need for caution to maintain balance after dismounting. The use of a muscle relaxer at night provides relief.    He expresses a desire to collaborate with physical therapy to address his balance issues. He relies on a walker for the initial half-hour after waking, during which he performs routine activities such as making coffee and brushing his teeth. Subsequently, he manages to move around the house independently, using furniture for support. He continues to cook for his family, exercising caution to avoid sudden movements that could compromise his balance.    He occasionally experiences heartburn, particularly after consuming a roast beef sandwich and tea, which he attributes to reflux. He finds relief with Pepcid.    He also reports mild ankle swelling, which persists even with the use of a diuretic.    He has been adhering to a low-cholesterol diet, primarily consuming chicken and lean beef.    MEDICATIONS  Current: gabapentin, Pepcid       Assessment/Plan  Michael Duvall Marissa Friedman is a 79 y.o. male who presents to clinic with:  1. Healthcare  maintenance    2. Primary hypertension    3. Mixed hyperlipidemia    4. Bilateral foot-drop    5. Balance disorder    6. Abnormal finding of blood chemistry, unspecified       274}    Assessment & Plan  1. Health maintenance.  He has been actively engaging in physical exercise and expresses a desire to incorporate physical therapy into his regimen. A referral for physical therapy will be initiated.    2. Hypertension.  His hypertension is currently well-managed. Continue to monitor blood pressure.    3. Hyperlipidemia.  He will maintain his statin therapy. Regular monitoring of lipid levels will be conducted.    4. Cervicalgia.  He has chronic neck pain. He will continue with gabapentin, muscle relaxer, and physical therapy. Regular monitoring of his condition will be conducted. Blood work will be obtained during the next visit.    5. Balance disorder.  A referral for physical therapy will be initiated to address balance issues.             This note was generated with the assistance of ambient listening technology. Verbal consent was obtained by the patient and accompanying visitor(s) for the recording of patient appointment to facilitate this note. I attest to having reviewed and edited the generated note for accuracy, though some syntax or spelling errors may persist. Please contact the author of this note for any clarification.      1. Healthcare maintenance    2. Primary hypertension  - TSH; Future  - Lipid Panel; Future  - Hemoglobin A1C; Future  - CBC Without Differential; Future  - Comprehensive Metabolic Panel; Future    3. Mixed hyperlipidemia  - TSH; Future  - Lipid Panel; Future  - Hemoglobin A1C; Future  - CBC Without Differential; Future  - Comprehensive Metabolic Panel; Future    4. Bilateral foot-drop  - Ambulatory referral/consult to Physical/Occupational Therapy; Future    5. Balance disorder    6. Abnormal finding of blood chemistry, unspecified  - TSH; Future  - Lipid Panel; Future  - Hemoglobin  A1C; Future  - CBC Without Differential; Future  - Comprehensive Metabolic Panel; Future  - METHYLMALONIC ACID, SERUM; Future      Roel Tyson MD   274}    If you are due for any health screening(s) below please notify me so we can arrange them to be ordered and scheduled. Most healthy patients at your age complete them, but you are free to accept or refuse.     If you can't do it, I'll definitely understand. If you can, I'd certainly appreciate it!   All of your core healthy metrics are met.

## 2025-02-22 DIAGNOSIS — Z00.00 ENCOUNTER FOR MEDICARE ANNUAL WELLNESS EXAM: ICD-10-CM

## 2025-02-24 ENCOUNTER — TELEPHONE (OUTPATIENT)
Dept: FAMILY MEDICINE | Facility: CLINIC | Age: 80
End: 2025-02-24
Payer: MEDICARE

## 2025-02-24 NOTE — TELEPHONE ENCOUNTER
[facility]  Plan of care signed and faxed to DOD. Document 2/7/25

## 2025-03-13 LAB — PSA, TOTAL (OHS): 0.04 NG/ML

## 2025-03-30 DIAGNOSIS — B02.7 DISSEMINATED HERPES ZOSTER: ICD-10-CM

## 2025-03-30 RX ORDER — GABAPENTIN 300 MG/1
CAPSULE ORAL
Qty: 270 CAPSULE | Refills: 3 | Status: SHIPPED | OUTPATIENT
Start: 2025-03-30

## 2025-03-30 NOTE — TELEPHONE ENCOUNTER
No care due was identified.  [facility] Embedded Care Due Messages. Reference number: [id number].   3/30/2025 3:46:16 AM CDT

## 2025-03-31 ENCOUNTER — PATIENT OUTREACH (OUTPATIENT)
Dept: ADMINISTRATIVE | Facility: HOSPITAL | Age: 80
End: 2025-03-31
Payer: MEDICARE

## 2025-07-17 ENCOUNTER — TELEPHONE (OUTPATIENT)
Dept: FAMILY MEDICINE | Facility: CLINIC | Age: 80
End: 2025-07-17
Payer: MEDICARE

## 2025-07-17 NOTE — TELEPHONE ENCOUNTER
Copied from CRM #[id number]. Topic: General Inquiry - Patient Advice  >> Jul 17, 2025  9:49 AM Karla wrote:  Type:  Needs Medical Advice    Who Called: pt  Symptoms (please be specific):    How long has patient had these symptoms:    Pharmacy name and phone #:    Would the patient rather a call back or a response via [facility]? call  Best Call Back Number: [phone]    Additional Information: pt would like an update on his paper work, on his paper work for a renewal of license plate. Pt would like a call today.

## 2025-07-30 ENCOUNTER — LAB VISIT (OUTPATIENT)
Dept: LAB | Facility: HOSPITAL | Age: 80
End: 2025-07-30
Attending: STUDENT IN AN ORGANIZED HEALTH CARE EDUCATION/TRAINING PROGRAM
Payer: MEDICARE

## 2025-07-30 DIAGNOSIS — R79.9 ABNORMAL FINDING OF BLOOD CHEMISTRY, UNSPECIFIED: ICD-10-CM

## 2025-07-30 DIAGNOSIS — E78.2 MIXED HYPERLIPIDEMIA: ICD-10-CM

## 2025-07-30 DIAGNOSIS — I10 PRIMARY HYPERTENSION: ICD-10-CM

## 2025-07-30 LAB
ALBUMIN SERPL BCP-MCNC: 3.8 G/DL (ref 3.5–5.2)
ALP SERPL-CCNC: 58 UNIT/L (ref 40–150)
ALT SERPL W/O P-5'-P-CCNC: 29 UNIT/L (ref 0–55)
ANION GAP (OHS): 7 MMOL/L (ref 8–16)
AST SERPL-CCNC: 32 UNIT/L (ref 0–50)
BILIRUB SERPL-MCNC: 1.3 MG/DL (ref 0.1–1)
BUN SERPL-MCNC: 19 MG/DL (ref 8–23)
CALCIUM SERPL-MCNC: 9.1 MG/DL (ref 8.7–10.5)
CHLORIDE SERPL-SCNC: 104 MMOL/L (ref 95–110)
CHOLEST SERPL-MCNC: 147 MG/DL (ref 120–199)
CHOLEST/HDLC SERPL: 3.9 {RATIO} (ref 2–5)
CO2 SERPL-SCNC: 28 MMOL/L (ref 23–29)
CREAT SERPL-MCNC: 0.8 MG/DL (ref 0.5–1.4)
EAG (OHS): 111 MG/DL (ref 68–131)
ERYTHROCYTE [DISTWIDTH] IN BLOOD BY AUTOMATED COUNT: 13.7 % (ref 11.5–14.5)
GFR SERPLBLD CREATININE-BSD FMLA CKD-EPI: >60 ML/MIN/1.73/M2
GLUCOSE SERPL-MCNC: 91 MG/DL (ref 70–110)
HBA1C MFR BLD: 5.5 % (ref 4–5.6)
HCT VFR BLD AUTO: 45.4 % (ref 40–54)
HDLC SERPL-MCNC: 38 MG/DL (ref 40–75)
HDLC SERPL: 25.9 % (ref 20–50)
HGB BLD-MCNC: 15.2 GM/DL (ref 14–18)
LDLC SERPL CALC-MCNC: 89.4 MG/DL (ref 63–159)
MCH RBC QN AUTO: 32.2 PG (ref 27–31)
MCHC RBC AUTO-ENTMCNC: 33.5 G/DL (ref 32–36)
MCV RBC AUTO: 96 FL (ref 82–98)
NONHDLC SERPL-MCNC: 109 MG/DL
PLATELET # BLD AUTO: 205 K/UL (ref 150–450)
PMV BLD AUTO: 10.4 FL (ref 9.2–12.9)
POTASSIUM SERPL-SCNC: 3.8 MMOL/L (ref 3.5–5.1)
PROT SERPL-MCNC: 6.6 GM/DL (ref 6–8.4)
RBC # BLD AUTO: 4.72 M/UL (ref 4.6–6.2)
SODIUM SERPL-SCNC: 139 MMOL/L (ref 136–145)
TRIGL SERPL-MCNC: 98 MG/DL (ref 30–150)
TSH SERPL-ACNC: 2.19 UIU/ML (ref 0.4–4)
WBC # BLD AUTO: 6.27 K/UL (ref 3.9–12.7)

## 2025-07-30 PROCEDURE — 82465 ASSAY BLD/SERUM CHOLESTEROL: CPT

## 2025-07-30 PROCEDURE — 83921 ORGANIC ACID SINGLE QUANT: CPT

## 2025-07-30 PROCEDURE — 82040 ASSAY OF SERUM ALBUMIN: CPT

## 2025-07-30 PROCEDURE — 83036 HEMOGLOBIN GLYCOSYLATED A1C: CPT

## 2025-07-30 PROCEDURE — 36415 COLL VENOUS BLD VENIPUNCTURE: CPT | Mod: PO

## 2025-07-30 PROCEDURE — 84443 ASSAY THYROID STIM HORMONE: CPT

## 2025-07-30 PROCEDURE — 85027 COMPLETE CBC AUTOMATED: CPT

## 2025-08-01 ENCOUNTER — OFFICE VISIT (OUTPATIENT)
Dept: FAMILY MEDICINE | Facility: CLINIC | Age: 80
End: 2025-08-01
Payer: MEDICARE

## 2025-08-01 VITALS
OXYGEN SATURATION: 98 % | HEIGHT: 67 IN | BODY MASS INDEX: 27.68 KG/M2 | RESPIRATION RATE: 16 BRPM | SYSTOLIC BLOOD PRESSURE: 110 MMHG | HEART RATE: 58 BPM | TEMPERATURE: 98 F | DIASTOLIC BLOOD PRESSURE: 64 MMHG | WEIGHT: 176.38 LBS

## 2025-08-01 DIAGNOSIS — I10 ESSENTIAL HYPERTENSION: ICD-10-CM

## 2025-08-01 DIAGNOSIS — B02.7 DISSEMINATED HERPES ZOSTER: ICD-10-CM

## 2025-08-01 DIAGNOSIS — M54.2 NECK PAIN: ICD-10-CM

## 2025-08-01 DIAGNOSIS — R60.0 BILATERAL LEG EDEMA: ICD-10-CM

## 2025-08-01 DIAGNOSIS — Z00.00 HEALTHCARE MAINTENANCE: Primary | ICD-10-CM

## 2025-08-01 DIAGNOSIS — E78.5 HYPERLIPIDEMIA, UNSPECIFIED HYPERLIPIDEMIA TYPE: ICD-10-CM

## 2025-08-01 PROCEDURE — 99213 OFFICE O/P EST LOW 20 MIN: CPT | Mod: PBBFAC,PO | Performed by: STUDENT IN AN ORGANIZED HEALTH CARE EDUCATION/TRAINING PROGRAM

## 2025-08-01 PROCEDURE — 99999 PR PBB SHADOW E&M-EST. PATIENT-LVL III: CPT | Mod: PBBFAC,,, | Performed by: STUDENT IN AN ORGANIZED HEALTH CARE EDUCATION/TRAINING PROGRAM

## 2025-08-01 RX ORDER — GABAPENTIN 300 MG/1
CAPSULE ORAL
Qty: 270 CAPSULE | Refills: 3 | Status: SHIPPED | OUTPATIENT
Start: 2025-08-01

## 2025-08-01 RX ORDER — ASPIRIN 325 MG
325 TABLET, DELAYED RELEASE (ENTERIC COATED) ORAL DAILY
COMMUNITY

## 2025-08-01 RX ORDER — TRIAMTERENE AND HYDROCHLOROTHIAZIDE 37.5; 25 MG/1; MG/1
1 TABLET ORAL DAILY
Qty: 90 TABLET | Refills: 3 | Status: SHIPPED | OUTPATIENT
Start: 2025-08-01

## 2025-08-01 RX ORDER — ATORVASTATIN CALCIUM 40 MG/1
40 TABLET, FILM COATED ORAL DAILY
Qty: 90 TABLET | Refills: 1 | Status: SHIPPED | OUTPATIENT
Start: 2025-08-01 | End: 2026-07-27

## 2025-08-01 RX ORDER — AMLODIPINE BESYLATE 2.5 MG/1
2.5 TABLET ORAL DAILY
Qty: 90 TABLET | Refills: 3 | Status: SHIPPED | OUTPATIENT
Start: 2025-08-01 | End: 2026-07-27

## 2025-08-01 NOTE — PROGRESS NOTES
[facility] Note    Subjective:    The HPI and pertinent ROS is included in the Diagnostic Impression Remarks section at the end of the note. Please see below for further details. Chief complaint is at end of note.     Michael is a pleasant intelligent patient who is here for evaluation.     Modified Medications    Modified Medication Previous Medication    AMLODIPINE (NORVASC) 2.5 MG TABLET amLODIPine (NORVASC) 2.5 MG tablet       Take 1 tablet (2.5 mg total) by mouth once daily.    TAKE 1 TABLET BY MOUTH ONCE  DAILY    ATORVASTATIN (LIPITOR) 40 MG TABLET atorvastatin (LIPITOR) 40 MG tablet       Take 1 tablet (40 mg total) by mouth once daily.    TAKE 1 TABLET BY MOUTH ONCE  DAILY    GABAPENTIN (NEURONTIN) 300 MG CAPSULE gabapentin (NEURONTIN) 300 MG capsule       TAKE 1 CAPSULE BY MOUTH 3  TIMES DAILY    TAKE 1 CAPSULE BY MOUTH 3 TIMES  DAILY    TRIAMTERENE-HYDROCHLOROTHIAZIDE 37.5-25 MG (MAXZIDE-25) 37.5-25 MG PER TABLET triamterene-hydrochlorothiazide 37.5-25 mg (MAXZIDE-25) 37.5-25 mg per tablet       Take 1 tablet by mouth once daily.    TAKE 1 TABLET BY MOUTH ONCE  DAILY       Data reviewed    274}  Previous medical records reviewed and summarized in plan section at end of note.      If you are due for any health screening(s) below please notify me so we can arrange them to be ordered and scheduled. Most healthy patients at your age complete them, but you are free to accept or refuse. If you can't do it, I'll definitely understand. If you can, I'd certainly appreciate it!     All of your core healthy metrics are met.      The following portions of the patient's history were reviewed and updated as appropriate: allergies, current medications, past family history, past medical history, past social history, past surgical history and problem list.    He  has a past medical history of Colon polyps, Hearing loss, Hiatal hernia, Hypertension, Prostate disease, Squamous cell carcinoma of skin, and  "Tuberculosis.  He  has a past surgical history that includes Hernia repair; Neck surgery; Hand surgery; Prostatectomy; Neck surgery; Prostate surgery; Hernia repair; Skin cancer excision; Shoulder surgery; Hand surgery; eye lid surgey; and Skin Cancer removed from his left arm and left shoulder.    He  reports that he has never smoked. He has never been exposed to tobacco smoke. He has never used smokeless tobacco. He reports that he does not drink alcohol and does not use drugs.  He family history includes Prostate cancer in his brother and brother.    Review of patient's allergies indicates:  No Known Allergies    Tobacco Use: Low Risk  (8/1/2025)    Patient History     Smoking Tobacco Use: Never     Smokeless Tobacco Use: Never     Passive Exposure: Never     Physical Examination  General appearance: alert, cooperative, no distress  Neck: no thyromegaly, no neck stiffness  Lungs: clear to auscultation, no wheezes, rales or rhonchi, symmetric air entry  Heart: normal rate, regular rhythm, normal S1, S2, no murmurs, rubs, clicks or gallops  Abdomen: soft, nontender, nondistended, no rigidity, rebound, or guarding.   Back: no point tenderness over spine  Extremities: peripheral pulses normal, no unilateral leg swelling or calf tenderness   Neurological:alert, oriented, normal speech, no new focal findings or movement disorder noted from baseline         BP Readings from Last 3 Encounters:   08/01/25 110/64   01/25/25 110/70   07/16/24 110/68     Wt Readings from Last 3 Encounters:   08/01/25 80 kg (176 lb 5.9 oz)   01/25/25 81 kg (178 lb 9.2 oz)   07/16/24 80 kg (176 lb 5.9 oz)     /64 (BP Location: Left arm, Patient Position: Sitting)   Pulse (!) 58   Temp 97.6 °F (36.4 °C) (Oral)   Resp 16   Ht 5' 7" (1.702 m)   Wt 80 kg (176 lb 5.9 oz)   SpO2 98%   BMI 27.62 kg/m²       274}  Laboratory: I have reviewed old labs below:       274}    Lab Results   Component Value Date    WBC 6.27 07/30/2025    HGB " 15.2 07/30/2025    HCT 45.4 07/30/2025    MCV 96 07/30/2025     07/30/2025     07/30/2025    K 3.8 07/30/2025     07/30/2025    CALCIUM 9.1 07/30/2025    CO2 28 07/30/2025    BUN 19 07/30/2025    CREATININE 0.8 07/30/2025    EGFRNORACEVR >60 07/30/2025    ALBUMIN 3.8 07/30/2025    BILITOT 1.3 (H) 07/30/2025    ALKPHOS 58 07/30/2025    ALT 29 07/30/2025    AST 32 07/30/2025    CHOL 147 07/30/2025    TRIG 98 07/30/2025    HDL 38 (L) 07/30/2025    LDLCALC 89.4 07/30/2025    TSH 2.190 07/30/2025    PSA <0.01 08/09/2016    HGBA1C 5.5 07/30/2025      Lab reviewed by me: Particular labs of significance that I will monitor, workup, or treat to improve are mentioned below in diagnostic impression remarks.      Imaging/EKG: I have reviewed the pertinent results and my findings are noted in remarks.     274}    CC:   Chief Complaint   Patient presents with    Follow-up    Hypertension    Hyperlipidemia           274}    History of Present Illness    CHIEF COMPLAINT:  Patient presents today for follow up.    NECK PAIN:  He reports intermittent neck soreness. He is currently taking Gabapentin for pain management related to residual herpes and neck discomfort. He denies associated symptoms such as shortness of breath.    LIFESTYLE:  He is working on maintaining a healthy diet and staying physically active. He reports feeling well overall.    MEDICATIONS:  Currently taking Gabapentin for herpes-related pain and neck pain.      ROS:  Respiratory: -shortness of breath  Integumentary: +genital sores or pain, +sores  Neck: +neck pain          Assessment/Plan  Michael Stevensonstanton CookShree is a 80 y.o. male who presents to clinic with:  1. Healthcare maintenance    2. Hyperlipidemia, unspecified hyperlipidemia type    3. Disseminated herpes zoster    4. Essential hypertension    5. Bilateral leg edema    6. Neck pain          274}    Assessment & Plan    Z00.00 Healthcare maintenance  E78.5 Hyperlipidemia, unspecified  hyperlipidemia type  B02.7 Disseminated herpes zoster  I10 Essential hypertension  R60.0 Bilateral leg edema  M54.2 Neck pain    PLAN SUMMARY:  - Continue Gabapentin for pain management  - Assess neck pain and herpes-related pain  - Evaluate overall health status, including diet and activity level    HEALTHCARE MAINTENANCE:  - Evaluated overall health status, including diet and activity level.    DISSEMINATED HERPES ZOSTER:  - Assessed herpes-related pain.  - Continued Gabapentin for pain management.    ESSENTIAL HYPERTENSION:  - Monitored for potential chest pain or shortness of breath.    NECK PAIN:  - Assessed neck pain.  - Continued Gabapentin for pain management.         HLD stable continue current meds monitor blood work rec mediterranean diet         This note was generated with the assistance of ambient listening technology. Verbal consent was obtained by the patient and accompanying visitor(s) for the recording of patient appointment to facilitate this note. I attest to having reviewed and edited the generated note for accuracy, though some syntax or spelling errors may persist. Please contact the author of this note for any clarification.       1. Healthcare maintenance    2. Hyperlipidemia, unspecified hyperlipidemia type  - atorvastatin (LIPITOR) 40 MG tablet; Take 1 tablet (40 mg total) by mouth once daily.  Dispense: 90 tablet; Refill: 1    3. Disseminated herpes zoster  - gabapentin (NEURONTIN) 300 MG capsule; TAKE 1 CAPSULE BY MOUTH 3  TIMES DAILY  Dispense: 270 capsule; Refill: 3    4. Essential hypertension  - triamterene-hydrochlorothiazide 37.5-25 mg (MAXZIDE-25) 37.5-25 mg per tablet; Take 1 tablet by mouth once daily.  Dispense: 90 tablet; Refill: 3    5. Bilateral leg edema  - triamterene-hydrochlorothiazide 37.5-25 mg (MAXZIDE-25) 37.5-25 mg per tablet; Take 1 tablet by mouth once daily.  Dispense: 90 tablet; Refill: 3    6. Neck pain      Roel Tyson MD      274}    If you are due for  any health screening(s) below please notify me so we can arrange them to be ordered and scheduled. Most healthy patients at your age complete them, but you are free to accept or refuse.     If you can't do it, I'll definitely understand. If you can, I'd certainly appreciate it!   All of your core healthy metrics are met.

## 2025-08-05 ENCOUNTER — TELEPHONE (OUTPATIENT)
Dept: FAMILY MEDICINE | Facility: CLINIC | Age: 80
End: 2025-08-05
Payer: MEDICARE

## 2025-08-05 DIAGNOSIS — E53.8 B12 DEFICIENCY: Primary | ICD-10-CM

## 2025-08-05 LAB — W METHYLMALONIC ACID: 0.41 UMOL/L

## 2025-08-05 RX ORDER — LANOLIN ALCOHOL/MO/W.PET/CERES
1000 CREAM (GRAM) TOPICAL DAILY
Qty: 90 TABLET | Refills: 3 | Status: SHIPPED | OUTPATIENT
Start: 2025-08-05 | End: 2026-08-05